# Patient Record
Sex: FEMALE | Race: WHITE | NOT HISPANIC OR LATINO | Employment: FULL TIME | URBAN - METROPOLITAN AREA
[De-identification: names, ages, dates, MRNs, and addresses within clinical notes are randomized per-mention and may not be internally consistent; named-entity substitution may affect disease eponyms.]

---

## 2019-07-22 ENCOUNTER — APPOINTMENT (OUTPATIENT)
Dept: ADMISSIONS | Facility: MEDICAL CENTER | Age: 24
DRG: 621 | End: 2019-07-22
Payer: COMMERCIAL

## 2019-07-22 DIAGNOSIS — Z01.812 PRE-OPERATIVE LABORATORY EXAMINATION: ICD-10-CM

## 2019-07-22 DIAGNOSIS — Z01.811 PRE-OPERATIVE RESPIRATORY EXAMINATION: ICD-10-CM

## 2019-07-22 DIAGNOSIS — Z01.810 PRE-OPERATIVE CARDIOVASCULAR EXAMINATION: ICD-10-CM

## 2019-07-23 ENCOUNTER — HOSPITAL ENCOUNTER (OUTPATIENT)
Dept: RADIOLOGY | Facility: MEDICAL CENTER | Age: 24
DRG: 621 | End: 2019-07-23
Attending: SURGERY
Payer: COMMERCIAL

## 2019-07-23 DIAGNOSIS — Z01.811 PRE-OPERATIVE RESPIRATORY EXAMINATION: ICD-10-CM

## 2019-07-23 DIAGNOSIS — Z01.812 PRE-PROCEDURAL LABORATORY EXAMINATION: Primary | ICD-10-CM

## 2019-07-23 DIAGNOSIS — Z01.810 PRE-OPERATIVE CARDIOVASCULAR EXAMINATION: ICD-10-CM

## 2019-07-23 LAB
25(OH)D3 SERPL-MCNC: 19 NG/ML (ref 30–100)
ALBUMIN SERPL BCP-MCNC: 4.1 G/DL (ref 3.2–4.9)
ALBUMIN/GLOB SERPL: 1.1 G/DL
ALP SERPL-CCNC: 93 U/L (ref 30–99)
ALT SERPL-CCNC: 18 U/L (ref 2–50)
ANION GAP SERPL CALC-SCNC: 9 MMOL/L (ref 0–11.9)
AST SERPL-CCNC: 16 U/L (ref 12–45)
BASOPHILS # BLD AUTO: 0.6 % (ref 0–1.8)
BASOPHILS # BLD: 0.08 K/UL (ref 0–0.12)
BILIRUB SERPL-MCNC: 0.5 MG/DL (ref 0.1–1.5)
BUN SERPL-MCNC: 18 MG/DL (ref 8–22)
CALCIUM SERPL-MCNC: 9.4 MG/DL (ref 8.5–10.5)
CHLORIDE SERPL-SCNC: 108 MMOL/L (ref 96–112)
CHOLEST SERPL-MCNC: 173 MG/DL (ref 100–199)
CO2 SERPL-SCNC: 21 MMOL/L (ref 20–33)
CREAT SERPL-MCNC: 0.68 MG/DL (ref 0.5–1.4)
EKG IMPRESSION: NORMAL
EOSINOPHIL # BLD AUTO: 0.19 K/UL (ref 0–0.51)
EOSINOPHIL NFR BLD: 1.5 % (ref 0–6.9)
ERYTHROCYTE [DISTWIDTH] IN BLOOD BY AUTOMATED COUNT: 40.9 FL (ref 35.9–50)
FERRITIN SERPL-MCNC: 55.9 NG/ML (ref 10–291)
FOLATE SERPL-MCNC: 19.6 NG/ML
GLOBULIN SER CALC-MCNC: 3.7 G/DL (ref 1.9–3.5)
GLUCOSE SERPL-MCNC: 91 MG/DL (ref 65–99)
HCT VFR BLD AUTO: 47.9 % (ref 37–47)
HDLC SERPL-MCNC: 46 MG/DL
HGB BLD-MCNC: 15.4 G/DL (ref 12–16)
IMM GRANULOCYTES # BLD AUTO: 0.05 K/UL (ref 0–0.11)
IMM GRANULOCYTES NFR BLD AUTO: 0.4 % (ref 0–0.9)
IRON SATN MFR SERPL: 23 % (ref 15–55)
IRON SERPL-MCNC: 84 UG/DL (ref 40–170)
LDLC SERPL CALC-MCNC: 102 MG/DL
LYMPHOCYTES # BLD AUTO: 2.65 K/UL (ref 1–4.8)
LYMPHOCYTES NFR BLD: 20.6 % (ref 22–41)
MCH RBC QN AUTO: 27.1 PG (ref 27–33)
MCHC RBC AUTO-ENTMCNC: 32.2 G/DL (ref 33.6–35)
MCV RBC AUTO: 84.2 FL (ref 81.4–97.8)
MONOCYTES # BLD AUTO: 0.66 K/UL (ref 0–0.85)
MONOCYTES NFR BLD AUTO: 5.1 % (ref 0–13.4)
NEUTROPHILS # BLD AUTO: 9.26 K/UL (ref 2–7.15)
NEUTROPHILS NFR BLD: 71.8 % (ref 44–72)
NRBC # BLD AUTO: 0 K/UL
NRBC BLD-RTO: 0 /100 WBC
PLATELET # BLD AUTO: 216 K/UL (ref 164–446)
PMV BLD AUTO: 12.1 FL (ref 9–12.9)
POTASSIUM SERPL-SCNC: 3.9 MMOL/L (ref 3.6–5.5)
PREALB SERPL-MCNC: 23 MG/DL (ref 18–38)
PROT SERPL-MCNC: 7.8 G/DL (ref 6–8.2)
PTH-INTACT SERPL-MCNC: 45.5 PG/ML (ref 14–72)
RBC # BLD AUTO: 5.69 M/UL (ref 4.2–5.4)
SODIUM SERPL-SCNC: 138 MMOL/L (ref 135–145)
TIBC SERPL-MCNC: 367 UG/DL (ref 250–450)
TRIGL SERPL-MCNC: 124 MG/DL (ref 0–149)
VIT B12 SERPL-MCNC: 323 PG/ML (ref 211–911)
WBC # BLD AUTO: 12.9 K/UL (ref 4.8–10.8)

## 2019-07-23 PROCEDURE — 83540 ASSAY OF IRON: CPT

## 2019-07-23 PROCEDURE — 84134 ASSAY OF PREALBUMIN: CPT

## 2019-07-23 PROCEDURE — 84425 ASSAY OF VITAMIN B-1: CPT

## 2019-07-23 PROCEDURE — 85025 COMPLETE CBC W/AUTO DIFF WBC: CPT

## 2019-07-23 PROCEDURE — 82746 ASSAY OF FOLIC ACID SERUM: CPT

## 2019-07-23 PROCEDURE — 82607 VITAMIN B-12: CPT

## 2019-07-23 PROCEDURE — 93010 ELECTROCARDIOGRAM REPORT: CPT | Performed by: INTERNAL MEDICINE

## 2019-07-23 PROCEDURE — 93005 ELECTROCARDIOGRAM TRACING: CPT

## 2019-07-23 PROCEDURE — 71046 X-RAY EXAM CHEST 2 VIEWS: CPT

## 2019-07-23 PROCEDURE — 82728 ASSAY OF FERRITIN: CPT

## 2019-07-23 PROCEDURE — 83550 IRON BINDING TEST: CPT

## 2019-07-23 PROCEDURE — 82306 VITAMIN D 25 HYDROXY: CPT

## 2019-07-23 PROCEDURE — 83970 ASSAY OF PARATHORMONE: CPT

## 2019-07-23 PROCEDURE — 36415 COLL VENOUS BLD VENIPUNCTURE: CPT

## 2019-07-23 PROCEDURE — 80053 COMPREHEN METABOLIC PANEL: CPT

## 2019-07-23 PROCEDURE — 80061 LIPID PANEL: CPT

## 2019-07-23 RX ORDER — LEVONORGESTREL / ETHINYL ESTRADIOL AND ETHINYL ESTRADIOL 150-30(84)
1 KIT ORAL DAILY
COMMUNITY
End: 2019-08-31

## 2019-07-23 RX ORDER — METOPROLOL SUCCINATE 50 MG/1
50 TABLET, EXTENDED RELEASE ORAL DAILY
COMMUNITY
End: 2019-07-23

## 2019-07-23 RX ORDER — PHENTERMINE HYDROCHLORIDE 37.5 MG/1
37.5 CAPSULE ORAL EVERY MORNING
Status: ON HOLD | COMMUNITY
End: 2019-08-05

## 2019-07-26 LAB — VIT B1 BLD-MCNC: 151 NMOL/L (ref 70–180)

## 2019-08-02 RX ORDER — ACETAMINOPHEN 10 MG/ML
1 INJECTION, SOLUTION INTRAVENOUS ONCE
Status: COMPLETED | OUTPATIENT
Start: 2019-08-03 | End: 2019-08-03

## 2019-08-05 ENCOUNTER — ANESTHESIA EVENT (OUTPATIENT)
Dept: SURGERY | Facility: MEDICAL CENTER | Age: 24
DRG: 621 | End: 2019-08-05
Payer: COMMERCIAL

## 2019-08-05 ENCOUNTER — ANESTHESIA (OUTPATIENT)
Dept: SURGERY | Facility: MEDICAL CENTER | Age: 24
DRG: 621 | End: 2019-08-05
Payer: COMMERCIAL

## 2019-08-05 ENCOUNTER — HOSPITAL ENCOUNTER (INPATIENT)
Facility: MEDICAL CENTER | Age: 24
LOS: 1 days | DRG: 621 | End: 2019-08-06
Attending: SURGERY | Admitting: SURGERY
Payer: COMMERCIAL

## 2019-08-05 DIAGNOSIS — G89.18 POSTOPERATIVE PAIN: ICD-10-CM

## 2019-08-05 LAB
GLUCOSE BLD-MCNC: 79 MG/DL (ref 65–99)
HCG UR QL: NEGATIVE
PATHOLOGY CONSULT NOTE: NORMAL
SP GR UR REFRACTOMETRY: 1.03

## 2019-08-05 PROCEDURE — 501570 HCHG TROCAR, SEPARATOR: Performed by: SURGERY

## 2019-08-05 PROCEDURE — 0DB64Z3 EXCISION OF STOMACH, PERCUTANEOUS ENDOSCOPIC APPROACH, VERTICAL: ICD-10-PCS | Performed by: SURGERY

## 2019-08-05 PROCEDURE — 502570 HCHG PACK, GASTRIC BANDING: Performed by: SURGERY

## 2019-08-05 PROCEDURE — 160002 HCHG RECOVERY MINUTES (STAT): Performed by: SURGERY

## 2019-08-05 PROCEDURE — 700111 HCHG RX REV CODE 636 W/ 250 OVERRIDE (IP): Performed by: SURGERY

## 2019-08-05 PROCEDURE — 700105 HCHG RX REV CODE 258: Performed by: SURGERY

## 2019-08-05 PROCEDURE — 770006 HCHG ROOM/CARE - MED/SURG/GYN SEMI*

## 2019-08-05 PROCEDURE — 82962 GLUCOSE BLOOD TEST: CPT

## 2019-08-05 PROCEDURE — 160009 HCHG ANES TIME/MIN: Performed by: SURGERY

## 2019-08-05 PROCEDURE — 88305 TISSUE EXAM BY PATHOLOGIST: CPT

## 2019-08-05 PROCEDURE — 160048 HCHG OR STATISTICAL LEVEL 1-5: Performed by: SURGERY

## 2019-08-05 PROCEDURE — 160035 HCHG PACU - 1ST 60 MINS PHASE I: Performed by: SURGERY

## 2019-08-05 PROCEDURE — 501571 HCHG TROCAR, SEPARATOR 12X100: Performed by: SURGERY

## 2019-08-05 PROCEDURE — 94760 N-INVAS EAR/PLS OXIMETRY 1: CPT

## 2019-08-05 PROCEDURE — 500868 HCHG NEEDLE, SURGI(VARES): Performed by: SURGERY

## 2019-08-05 PROCEDURE — 700101 HCHG RX REV CODE 250: Performed by: ANESTHESIOLOGY

## 2019-08-05 PROCEDURE — A9270 NON-COVERED ITEM OR SERVICE: HCPCS | Performed by: SURGERY

## 2019-08-05 PROCEDURE — 700111 HCHG RX REV CODE 636 W/ 250 OVERRIDE (IP): Performed by: PHYSICIAN ASSISTANT

## 2019-08-05 PROCEDURE — 0BQT4ZZ REPAIR DIAPHRAGM, PERCUTANEOUS ENDOSCOPIC APPROACH: ICD-10-PCS | Performed by: SURGERY

## 2019-08-05 PROCEDURE — 700105 HCHG RX REV CODE 258: Performed by: PHYSICIAN ASSISTANT

## 2019-08-05 PROCEDURE — 700102 HCHG RX REV CODE 250 W/ 637 OVERRIDE(OP): Performed by: SURGERY

## 2019-08-05 PROCEDURE — 700101 HCHG RX REV CODE 250: Performed by: SURGERY

## 2019-08-05 PROCEDURE — 501664 HCHG TUBING, FILTER STRYKER: Performed by: SURGERY

## 2019-08-05 PROCEDURE — 160041 HCHG SURGERY MINUTES - EA ADDL 1 MIN LEVEL 4: Performed by: SURGERY

## 2019-08-05 PROCEDURE — 700111 HCHG RX REV CODE 636 W/ 250 OVERRIDE (IP): Performed by: ANESTHESIOLOGY

## 2019-08-05 PROCEDURE — 500521 HCHG ENDOSTITCH LOAD UNIT: Performed by: SURGERY

## 2019-08-05 PROCEDURE — 160036 HCHG PACU - EA ADDL 30 MINS PHASE I: Performed by: SURGERY

## 2019-08-05 PROCEDURE — 160029 HCHG SURGERY MINUTES - 1ST 30 MINS LEVEL 4: Performed by: SURGERY

## 2019-08-05 PROCEDURE — 500800 HCHG LAPAROSCOPIC J/L HOOK: Performed by: SURGERY

## 2019-08-05 PROCEDURE — A9270 NON-COVERED ITEM OR SERVICE: HCPCS | Performed by: ANESTHESIOLOGY

## 2019-08-05 PROCEDURE — 501583 HCHG TROCAR, THRD CAN&SEAL 5X100: Performed by: SURGERY

## 2019-08-05 PROCEDURE — A6402 STERILE GAUZE <= 16 SQ IN: HCPCS | Performed by: SURGERY

## 2019-08-05 PROCEDURE — 81025 URINE PREGNANCY TEST: CPT

## 2019-08-05 PROCEDURE — 501838 HCHG SUTURE GENERAL: Performed by: SURGERY

## 2019-08-05 PROCEDURE — 700102 HCHG RX REV CODE 250 W/ 637 OVERRIDE(OP): Performed by: ANESTHESIOLOGY

## 2019-08-05 PROCEDURE — 500522 HCHG ENDOSTITCH SUTURING DEVICE: Performed by: SURGERY

## 2019-08-05 RX ORDER — DEXAMETHASONE SODIUM PHOSPHATE 4 MG/ML
INJECTION, SOLUTION INTRA-ARTICULAR; INTRALESIONAL; INTRAMUSCULAR; INTRAVENOUS; SOFT TISSUE PRN
Status: DISCONTINUED | OUTPATIENT
Start: 2019-08-05 | End: 2019-08-05 | Stop reason: SURG

## 2019-08-05 RX ORDER — ENALAPRILAT 1.25 MG/ML
2.5 INJECTION INTRAVENOUS EVERY 6 HOURS PRN
Status: DISCONTINUED | OUTPATIENT
Start: 2019-08-05 | End: 2019-08-06 | Stop reason: HOSPADM

## 2019-08-05 RX ORDER — OXYCODONE HCL 5 MG/5 ML
10 SOLUTION, ORAL ORAL
Status: COMPLETED | OUTPATIENT
Start: 2019-08-05 | End: 2019-08-05

## 2019-08-05 RX ORDER — OXYCODONE HCL 5 MG/5 ML
5 SOLUTION, ORAL ORAL
Status: COMPLETED | OUTPATIENT
Start: 2019-08-05 | End: 2019-08-05

## 2019-08-05 RX ORDER — ROCURONIUM BROMIDE 10 MG/ML
INJECTION, SOLUTION INTRAVENOUS PRN
Status: DISCONTINUED | OUTPATIENT
Start: 2019-08-05 | End: 2019-08-05 | Stop reason: SURG

## 2019-08-05 RX ORDER — ACETAMINOPHEN 10 MG/ML
1 INJECTION, SOLUTION INTRAVENOUS ONCE
Status: COMPLETED | OUTPATIENT
Start: 2019-08-05 | End: 2019-08-05

## 2019-08-05 RX ORDER — PROMETHAZINE HYDROCHLORIDE 25 MG/1
25 SUPPOSITORY RECTAL EVERY 4 HOURS PRN
Status: DISCONTINUED | OUTPATIENT
Start: 2019-08-05 | End: 2019-08-06 | Stop reason: HOSPADM

## 2019-08-05 RX ORDER — ONDANSETRON 4 MG/1
4 TABLET, ORALLY DISINTEGRATING ORAL EVERY 4 HOURS PRN
Status: DISCONTINUED | OUTPATIENT
Start: 2019-08-05 | End: 2019-08-06 | Stop reason: HOSPADM

## 2019-08-05 RX ORDER — LORAZEPAM 2 MG/ML
.5-1 INJECTION INTRAMUSCULAR EVERY 4 HOURS PRN
Status: DISCONTINUED | OUTPATIENT
Start: 2019-08-05 | End: 2019-08-06 | Stop reason: HOSPADM

## 2019-08-05 RX ORDER — SODIUM CHLORIDE, SODIUM LACTATE, POTASSIUM CHLORIDE, CALCIUM CHLORIDE 600; 310; 30; 20 MG/100ML; MG/100ML; MG/100ML; MG/100ML
INJECTION, SOLUTION INTRAVENOUS CONTINUOUS
Status: ACTIVE | OUTPATIENT
Start: 2019-08-05 | End: 2019-08-05

## 2019-08-05 RX ORDER — DIPHENHYDRAMINE HYDROCHLORIDE 50 MG/ML
12.5 INJECTION INTRAMUSCULAR; INTRAVENOUS EVERY 6 HOURS PRN
Status: DISCONTINUED | OUTPATIENT
Start: 2019-08-05 | End: 2019-08-06 | Stop reason: HOSPADM

## 2019-08-05 RX ORDER — MORPHINE SULFATE 4 MG/ML
1-5 INJECTION, SOLUTION INTRAMUSCULAR; INTRAVENOUS
Status: DISCONTINUED | OUTPATIENT
Start: 2019-08-05 | End: 2019-08-06 | Stop reason: HOSPADM

## 2019-08-05 RX ORDER — HALOPERIDOL 5 MG/ML
1 INJECTION INTRAMUSCULAR
Status: DISCONTINUED | OUTPATIENT
Start: 2019-08-05 | End: 2019-08-05 | Stop reason: HOSPADM

## 2019-08-05 RX ORDER — ACETAMINOPHEN 10 MG/ML
1000 INJECTION, SOLUTION INTRAVENOUS EVERY 6 HOURS
Status: COMPLETED | OUTPATIENT
Start: 2019-08-05 | End: 2019-08-06

## 2019-08-05 RX ORDER — HYDROMORPHONE HYDROCHLORIDE 1 MG/ML
0.2 INJECTION, SOLUTION INTRAMUSCULAR; INTRAVENOUS; SUBCUTANEOUS
Status: DISCONTINUED | OUTPATIENT
Start: 2019-08-05 | End: 2019-08-05 | Stop reason: HOSPADM

## 2019-08-05 RX ORDER — DEXMEDETOMIDINE HYDROCHLORIDE 100 UG/ML
INJECTION, SOLUTION INTRAVENOUS PRN
Status: DISCONTINUED | OUTPATIENT
Start: 2019-08-05 | End: 2019-08-05 | Stop reason: SURG

## 2019-08-05 RX ORDER — ONDANSETRON 2 MG/ML
4 INJECTION INTRAMUSCULAR; INTRAVENOUS EVERY 4 HOURS PRN
Status: DISCONTINUED | OUTPATIENT
Start: 2019-08-05 | End: 2019-08-06 | Stop reason: HOSPADM

## 2019-08-05 RX ORDER — HYDRALAZINE HYDROCHLORIDE 20 MG/ML
10 INJECTION INTRAMUSCULAR; INTRAVENOUS EVERY 6 HOURS PRN
Status: DISCONTINUED | OUTPATIENT
Start: 2019-08-05 | End: 2019-08-06 | Stop reason: HOSPADM

## 2019-08-05 RX ORDER — METOPROLOL TARTRATE 1 MG/ML
1 INJECTION, SOLUTION INTRAVENOUS
Status: DISCONTINUED | OUTPATIENT
Start: 2019-08-05 | End: 2019-08-05 | Stop reason: HOSPADM

## 2019-08-05 RX ORDER — BUPIVACAINE HYDROCHLORIDE AND EPINEPHRINE 5; 5 MG/ML; UG/ML
INJECTION, SOLUTION EPIDURAL; INTRACAUDAL; PERINEURAL
Status: DISCONTINUED | OUTPATIENT
Start: 2019-08-05 | End: 2019-08-05 | Stop reason: HOSPADM

## 2019-08-05 RX ORDER — ONDANSETRON 2 MG/ML
4 INJECTION INTRAMUSCULAR; INTRAVENOUS
Status: DISCONTINUED | OUTPATIENT
Start: 2019-08-05 | End: 2019-08-05 | Stop reason: HOSPADM

## 2019-08-05 RX ORDER — LIDOCAINE HYDROCHLORIDE 10 MG/ML
INJECTION, SOLUTION EPIDURAL; INFILTRATION; INTRACAUDAL; PERINEURAL
Status: COMPLETED
Start: 2019-08-05 | End: 2019-08-05

## 2019-08-05 RX ORDER — FLUOXETINE HYDROCHLORIDE 20 MG/1
20 CAPSULE ORAL DAILY
COMMUNITY
End: 2019-08-31

## 2019-08-05 RX ORDER — SODIUM CHLORIDE, SODIUM LACTATE, POTASSIUM CHLORIDE, CALCIUM CHLORIDE 600; 310; 30; 20 MG/100ML; MG/100ML; MG/100ML; MG/100ML
INJECTION, SOLUTION INTRAVENOUS CONTINUOUS
Status: DISCONTINUED | OUTPATIENT
Start: 2019-08-05 | End: 2019-08-06 | Stop reason: HOSPADM

## 2019-08-05 RX ORDER — SODIUM CHLORIDE, SODIUM LACTATE, POTASSIUM CHLORIDE, AND CALCIUM CHLORIDE .6; .31; .03; .02 G/100ML; G/100ML; G/100ML; G/100ML
500 INJECTION, SOLUTION INTRAVENOUS
Status: DISCONTINUED | OUTPATIENT
Start: 2019-08-05 | End: 2019-08-06 | Stop reason: HOSPADM

## 2019-08-05 RX ORDER — CEFAZOLIN SODIUM 1 G/3ML
INJECTION, POWDER, FOR SOLUTION INTRAMUSCULAR; INTRAVENOUS PRN
Status: DISCONTINUED | OUTPATIENT
Start: 2019-08-05 | End: 2019-08-05 | Stop reason: SURG

## 2019-08-05 RX ORDER — HYDRALAZINE HYDROCHLORIDE 20 MG/ML
5 INJECTION INTRAMUSCULAR; INTRAVENOUS
Status: DISCONTINUED | OUTPATIENT
Start: 2019-08-05 | End: 2019-08-05 | Stop reason: HOSPADM

## 2019-08-05 RX ORDER — PROMETHAZINE HYDROCHLORIDE 25 MG/1
25 SUPPOSITORY RECTAL EVERY 6 HOURS PRN
Status: DISCONTINUED | OUTPATIENT
Start: 2019-08-05 | End: 2019-08-06 | Stop reason: HOSPADM

## 2019-08-05 RX ORDER — ONDANSETRON 2 MG/ML
INJECTION INTRAMUSCULAR; INTRAVENOUS PRN
Status: DISCONTINUED | OUTPATIENT
Start: 2019-08-05 | End: 2019-08-05 | Stop reason: SURG

## 2019-08-05 RX ORDER — SCOLOPAMINE TRANSDERMAL SYSTEM 1 MG/1
1 PATCH, EXTENDED RELEASE TRANSDERMAL
Status: DISCONTINUED | OUTPATIENT
Start: 2019-08-05 | End: 2019-08-06 | Stop reason: HOSPADM

## 2019-08-05 RX ORDER — KETOROLAC TROMETHAMINE 30 MG/ML
30 INJECTION, SOLUTION INTRAMUSCULAR; INTRAVENOUS EVERY 6 HOURS
Status: DISCONTINUED | OUTPATIENT
Start: 2019-08-05 | End: 2019-08-06 | Stop reason: HOSPADM

## 2019-08-05 RX ORDER — METOPROLOL TARTRATE 1 MG/ML
INJECTION, SOLUTION INTRAVENOUS PRN
Status: DISCONTINUED | OUTPATIENT
Start: 2019-08-05 | End: 2019-08-05 | Stop reason: SURG

## 2019-08-05 RX ORDER — HYDROMORPHONE HYDROCHLORIDE 1 MG/ML
0.4 INJECTION, SOLUTION INTRAMUSCULAR; INTRAVENOUS; SUBCUTANEOUS
Status: DISCONTINUED | OUTPATIENT
Start: 2019-08-05 | End: 2019-08-05 | Stop reason: HOSPADM

## 2019-08-05 RX ORDER — HYDROMORPHONE HYDROCHLORIDE 1 MG/ML
0.1 INJECTION, SOLUTION INTRAMUSCULAR; INTRAVENOUS; SUBCUTANEOUS
Status: DISCONTINUED | OUTPATIENT
Start: 2019-08-05 | End: 2019-08-05 | Stop reason: HOSPADM

## 2019-08-05 RX ORDER — DIPHENHYDRAMINE HYDROCHLORIDE 50 MG/ML
12.5 INJECTION INTRAMUSCULAR; INTRAVENOUS
Status: DISCONTINUED | OUTPATIENT
Start: 2019-08-05 | End: 2019-08-05 | Stop reason: HOSPADM

## 2019-08-05 RX ADMIN — SODIUM CHLORIDE, POTASSIUM CHLORIDE, SODIUM LACTATE AND CALCIUM CHLORIDE: 600; 310; 30; 20 INJECTION, SOLUTION INTRAVENOUS at 13:37

## 2019-08-05 RX ADMIN — FAMOTIDINE 20 MG: 10 INJECTION INTRAVENOUS at 18:01

## 2019-08-05 RX ADMIN — SCOPALAMINE 1 PATCH: 1 PATCH, EXTENDED RELEASE TRANSDERMAL at 12:38

## 2019-08-05 RX ADMIN — FENTANYL CITRATE 50 MCG: 50 INJECTION, SOLUTION INTRAMUSCULAR; INTRAVENOUS at 14:32

## 2019-08-05 RX ADMIN — SUGAMMADEX 200 MG: 100 INJECTION, SOLUTION INTRAVENOUS at 14:05

## 2019-08-05 RX ADMIN — FENTANYL CITRATE 50 MCG: 50 INJECTION, SOLUTION INTRAMUSCULAR; INTRAVENOUS at 14:50

## 2019-08-05 RX ADMIN — ROCURONIUM BROMIDE 80 MG: 10 INJECTION, SOLUTION INTRAVENOUS at 13:11

## 2019-08-05 RX ADMIN — SODIUM CHLORIDE, POTASSIUM CHLORIDE, SODIUM LACTATE AND CALCIUM CHLORIDE: 600; 310; 30; 20 INJECTION, SOLUTION INTRAVENOUS at 12:50

## 2019-08-05 RX ADMIN — OXYCODONE HYDROCHLORIDE 10 MG: 5 SOLUTION ORAL at 14:48

## 2019-08-05 RX ADMIN — ACETAMINOPHEN 1 G: 10 INJECTION, SOLUTION INTRAVENOUS at 12:38

## 2019-08-05 RX ADMIN — FENTANYL CITRATE 150 MCG: 50 INJECTION, SOLUTION INTRAMUSCULAR; INTRAVENOUS at 13:23

## 2019-08-05 RX ADMIN — DEXAMETHASONE SODIUM PHOSPHATE 8 MG: 4 INJECTION, SOLUTION INTRA-ARTICULAR; INTRALESIONAL; INTRAMUSCULAR; INTRAVENOUS; SOFT TISSUE at 13:22

## 2019-08-05 RX ADMIN — MORPHINE SULFATE 1 MG: 4 INJECTION INTRAVENOUS at 19:57

## 2019-08-05 RX ADMIN — ACETAMINOPHEN 1000 MG: 10 INJECTION, SOLUTION INTRAVENOUS at 19:58

## 2019-08-05 RX ADMIN — FENTANYL CITRATE 100 MCG: 50 INJECTION, SOLUTION INTRAMUSCULAR; INTRAVENOUS at 13:11

## 2019-08-05 RX ADMIN — PROPOFOL 200 MG: 10 INJECTION, EMULSION INTRAVENOUS at 13:11

## 2019-08-05 RX ADMIN — ONDANSETRON 4 MG: 2 INJECTION INTRAMUSCULAR; INTRAVENOUS at 19:58

## 2019-08-05 RX ADMIN — SODIUM CHLORIDE, POTASSIUM CHLORIDE, SODIUM LACTATE AND CALCIUM CHLORIDE: 600; 310; 30; 20 INJECTION, SOLUTION INTRAVENOUS at 18:00

## 2019-08-05 RX ADMIN — METOPROLOL TARTRATE 3 MG: 5 INJECTION, SOLUTION INTRAVENOUS at 13:31

## 2019-08-05 RX ADMIN — DEXMEDETOMIDINE HYDROCHLORIDE 50 MCG: 100 INJECTION, SOLUTION INTRAVENOUS at 14:17

## 2019-08-05 RX ADMIN — METOPROLOL TARTRATE 2 MG: 5 INJECTION, SOLUTION INTRAVENOUS at 13:28

## 2019-08-05 RX ADMIN — FENTANYL CITRATE 100 MCG: 50 INJECTION, SOLUTION INTRAMUSCULAR; INTRAVENOUS at 14:07

## 2019-08-05 RX ADMIN — CEFAZOLIN 3 G: 330 INJECTION, POWDER, FOR SOLUTION INTRAMUSCULAR; INTRAVENOUS at 13:07

## 2019-08-05 RX ADMIN — ONDANSETRON 4 MG: 2 INJECTION INTRAMUSCULAR; INTRAVENOUS at 13:22

## 2019-08-05 ASSESSMENT — LIFESTYLE VARIABLES
HAVE YOU EVER FELT YOU SHOULD CUT DOWN ON YOUR DRINKING: NO
EVER_SMOKED: NEVER
EVER FELT BAD OR GUILTY ABOUT YOUR DRINKING: NO
DOES PATIENT WANT TO STOP DRINKING: NO
EVER HAD A DRINK FIRST THING IN THE MORNING TO STEADY YOUR NERVES TO GET RID OF A HANGOVER: NO
ON A TYPICAL DAY WHEN YOU DRINK ALCOHOL HOW MANY DRINKS DO YOU HAVE: 4
HOW MANY TIMES IN THE PAST YEAR HAVE YOU HAD 5 OR MORE DRINKS IN A DAY: 2
CONSUMPTION TOTAL: POSITIVE
AVERAGE NUMBER OF DAYS PER WEEK YOU HAVE A DRINK CONTAINING ALCOHOL: .25
EVER_SMOKED: NEVER
TOTAL SCORE: 0
TOTAL SCORE: 0
ALCOHOL_USE: YES
HAVE PEOPLE ANNOYED YOU BY CRITICIZING YOUR DRINKING: NO
TOTAL SCORE: 0

## 2019-08-05 ASSESSMENT — PAIN SCALES - GENERAL: PAIN_LEVEL: 5

## 2019-08-05 ASSESSMENT — COGNITIVE AND FUNCTIONAL STATUS - GENERAL
DAILY ACTIVITIY SCORE: 24
SUGGESTED CMS G CODE MODIFIER DAILY ACTIVITY: CH
MOBILITY SCORE: 24
SUGGESTED CMS G CODE MODIFIER MOBILITY: CH

## 2019-08-05 ASSESSMENT — COPD QUESTIONNAIRES
DURING THE PAST 4 WEEKS HOW MUCH DID YOU FEEL SHORT OF BREATH: NONE/LITTLE OF THE TIME
DO YOU EVER COUGH UP ANY MUCUS OR PHLEGM?: NO/ONLY WITH OCCASIONAL COLDS OR INFECTIONS
HAVE YOU SMOKED AT LEAST 100 CIGARETTES IN YOUR ENTIRE LIFE: NO/DON'T KNOW
COPD SCREENING SCORE: 0

## 2019-08-05 ASSESSMENT — PATIENT HEALTH QUESTIONNAIRE - PHQ9
2. FEELING DOWN, DEPRESSED, IRRITABLE, OR HOPELESS: NOT AT ALL
1. LITTLE INTEREST OR PLEASURE IN DOING THINGS: NOT AT ALL
SUM OF ALL RESPONSES TO PHQ9 QUESTIONS 1 AND 2: 0

## 2019-08-05 ASSESSMENT — PAIN SCALES - WONG BAKER: WONGBAKER_NUMERICALRESPONSE: HURTS AS MUCH AS POSSIBLE

## 2019-08-05 NOTE — PROGRESS NOTES
Pr Op completed for patient. Family arrived just in time for surgery. VSS and no c/o pain at this time. Patient educated on use of call light and bed in low position. No questions or concerns at this time.

## 2019-08-05 NOTE — ANESTHESIA TIME REPORT
Anesthesia Start and Stop Event Times     Date Time Event    8/5/2019 1305 Ready for Procedure     1307 Anesthesia Start     1421 Anesthesia Stop        Responsible Staff  08/05/19    Name Role Begin End    Carlos Baez M.D. Anesth 1307 1421        Preop Diagnosis (Free Text):  Pre-op Diagnosis     MORBID OBESITY         Preop Diagnosis (Codes):    Post op Diagnosis  Morbid obesity (HCC)      Premium Reason  Non-Premium    Comments:

## 2019-08-05 NOTE — OP REPORT
Operative Report    Date: 8/5/2019    Surgeon: John Ganser M.D.    Assistant: Levy Hoffman PA-C    Anesthesia: Dr. Baez    Preoperative Diagnosis: Morbid Obesity, GERD, PCOS    Postoperative Diagnosis: Same, Hiatal Hernia    Procedure Performed: Laparoscopic Vertical Sleeve Gastrectomy, Hiatal hernia repair    Indications: The patient is suffering from morbid obesity and it's sequelae with a body mass index of 51.8 kg/m2. They have failed dietary attempts at weight loss and have been fully counseled about risks and alternatives to sleeve gastrectomy and wish to proceed.    Findings: Sliding hiatal hernia    DESCRIPTION OF PROCEDURE: The patient was identified and general anesthetic   administered. Her abdomen was prepped and draped in the usual sterile   fashion. Local anesthesia of 0.5% Marcaine with epinephrine was injected   prior to making skin incision. Small incision was made to left midline in low  epigastric region and the Veress needle passed. The abdomen was insufflated   with carbon dioxide without incident and a 5-mm blunt trocar and 5-mm   30-degree scope inserted. Girma liver retractor was passed through a   small subxiphoid incision, used to elevate the lateral segment of liver and   this was held with the robotic arm. Right upper quadrant 12 mm, left upper   quadrant 15 mm, left lateral subcostal 5 mm trocars were placed. Inspection   of the hiatus revealed a sliding hiatal hernia. The fat pad was rotated off the gastroesophageal junction to help expose this area as well. The omentum was then taken down off the greater curve of the stomach using the Harmonic scalpel.    The gastrohepatic ligament was divided and the posterior hiatal hernia was reduced. The hiatal hernia was repaired posteriorly with 2 sutures using the Endostitch device and 0-Surgidac.    A 40-Tajik bougie wasthen passed by anesthesia and laid along the lesser curve of the stomach. The Del Aire 60 power stapler was then  placed starting about 4-5 cm proximal to the pylorus and positioned adjacent to the bougie and fired. The sleeve was   completed with sequential firing of the stapler using green and gold loads. A  small cuff of stomach was left adjacent to the esophagus. The distal stomach was removed through the 15 mm trocar site.    An anterior hiatal closure suture was placed using with 0-Surgidac.The staple line was then oversewn with 3-0 absorbable V-Loc suture   incorporating the omentum along the way. The bougie was removed and the   sleeve maintained good orientation with no torsion or kinks. The abdomen was   irrigated and hemostasis assured. The trocars were removed. The abdomen   deflated, and incisions closed with Vicryl. Sterile dressings were applied.   The patient returned to recovery room in stable condition.    ____________________________________  JOHN H. GANSER, MD John Ganser, M.D., F.A.C.S.  [facility]  [facility]  [phone]

## 2019-08-05 NOTE — ANESTHESIA POSTPROCEDURE EVALUATION
Patient: Kenzie Gray    Procedure Summary     Date:  08/05/19 Room / Location:  [facility] 09 / SURGERY [facility]    Anesthesia Start:  1307 Anesthesia Stop:  1421    Procedure:  GASTRECTOMY, SLEEVE, LAPAROSCOPIC, HIATAL HERNIA REPAIR (Abdomen) Diagnosis:  (MORBID OBESITY )    Surgeon:  John H Ganser, M.D. Responsible Provider:  Carlos Baez M.D.    Anesthesia Type:  general ASA Status:  3          Final Anesthesia Type: general  Last vitals  BP   NIBP: 132/76    Temp   36.6 °C (97.9 °F)    Pulse   Pulse: 97   Resp   16    SpO2   93 %      Anesthesia Post Evaluation    Patient location during evaluation: PACU  Patient participation: complete - patient participated  Level of consciousness: awake and alert  Pain score: 5    Airway patency: patent  Anesthetic complications: no  Cardiovascular status: hemodynamically stable  Respiratory status: acceptable  Hydration status: euvolemic    PONV: none           Nurse Pain Score: 0 (NPRS)

## 2019-08-05 NOTE — DISCHARGE INSTR - OTHER INFO
Gastric bypass clears today, advance diet as tolerated to gastric bypass full liquids on morning of POD#2.  Incentive spirometry Q hr while awake, continue at home.  Up ad adi.  Ok to Shower over Tegaderms today; remove Tegaderms on 8/09/19 .  No baths or soaks x 14 days.  No driving x 4-5 days.  No lifting > 15 lbs until after post-op appt.  D/C home when alert, comfortable, ambulatory, and tolerating PO well.  Pt Counseled re: I.S., diet, activity, home med's, continued use of incentive spirometer at home,  and wound care.  Begin PPI at home (all home med's larger in size than eraser head should be crushed or changed to liquid form x 2-3 weeks, while on liquid diet).  Hold MVI/supplements until after postop appointment.  F/U with Dr. Ganser ~ 1-2 weeks.

## 2019-08-05 NOTE — OR NURSING
Patient was very tearful in recovery. Medicated with IV and oral pain medications with good relief. Small ice chips given. Patient educated on goals for this recovery and expectations after surgery. She has rested and is eager to go to her room. Her mom has been updated. Awaiting room to be ready. Belongings are on her bed

## 2019-08-05 NOTE — ANESTHESIA PROCEDURE NOTES
Airway  Date/Time: 8/5/2019 1:12 PM  Performed by: Carlos Baez M.D.  Authorized by: Carlos Baez M.D.     Location:  OR  Urgency:  Elective  Indications for Airway Management:  Anesthesia  Spontaneous Ventilation: absent    Sedation Level:  Deep  Preoxygenated: Yes    Patient Position:  Sniffing  Final Airway Type:  Endotracheal airway  Final Endotracheal Airway:  ETT  Cuffed: Yes    Technique Used for Successful ETT Placement:  Direct laryngoscopy  Insertion Site:  Oral  Blade Type:  Ivett  Laryngoscope Blade/Videolaryngoscope Blade Size:  3  ETT Size (mm):  7.0  Measured from:  Teeth  ETT to Teeth (cm):  21  Placement Verified by: auscultation and capnometry    Cormack-Lehane Classification:  Grade I - full view of glottis  Number of Attempts at Approach:  1

## 2019-08-05 NOTE — ANESTHESIA PREPROCEDURE EVALUATION
Relevant Problems   CARDIAC   (+) Migraine headache       Physical Exam    Airway   Mallampati: III  TM distance: >3 FB  Neck ROM: full       Cardiovascular - normal exam  Rhythm: regular  Rate: normal  (-) murmur     Dental - normal exam         Pulmonary - normal exam  Breath sounds clear to auscultation     Abdominal    Neurological - normal exam                 Anesthesia Plan    ASA 3   ASA physical status 3 criteria: morbid obesity - BMI greater than or equal to 40    Plan - general       Airway plan will be ETT        Induction: intravenous    Postoperative Plan: Postoperative administration of opioids is intended.    Pertinent diagnostic labs and testing reviewed    Informed Consent:    Anesthetic plan and risks discussed with patient.    Use of blood products discussed with: patient whom consented to blood products.

## 2019-08-06 VITALS
RESPIRATION RATE: 16 BRPM | WEIGHT: 293 LBS | DIASTOLIC BLOOD PRESSURE: 60 MMHG | HEART RATE: 57 BPM | TEMPERATURE: 98.1 F | HEIGHT: 71 IN | SYSTOLIC BLOOD PRESSURE: 114 MMHG | BODY MASS INDEX: 41.02 KG/M2 | OXYGEN SATURATION: 98 %

## 2019-08-06 LAB
ANION GAP SERPL CALC-SCNC: 21 MMOL/L (ref 0–11.9)
BUN SERPL-MCNC: 12 MG/DL (ref 8–22)
CALCIUM SERPL-MCNC: 8.8 MG/DL (ref 8.5–10.5)
CHLORIDE SERPL-SCNC: 93 MMOL/L (ref 96–112)
CO2 SERPL-SCNC: 22 MMOL/L (ref 20–33)
CREAT SERPL-MCNC: 0.53 MG/DL (ref 0.5–1.4)
ERYTHROCYTE [DISTWIDTH] IN BLOOD BY AUTOMATED COUNT: 40.8 FL (ref 35.9–50)
GLUCOSE SERPL-MCNC: 110 MG/DL (ref 65–99)
HCT VFR BLD AUTO: 48.6 % (ref 37–47)
HGB BLD-MCNC: 15.6 G/DL (ref 12–16)
MCH RBC QN AUTO: 27 PG (ref 27–33)
MCHC RBC AUTO-ENTMCNC: 32.1 G/DL (ref 33.6–35)
MCV RBC AUTO: 84.2 FL (ref 81.4–97.8)
PLATELET # BLD AUTO: 150 K/UL (ref 164–446)
PMV BLD AUTO: 13.1 FL (ref 9–12.9)
POTASSIUM SERPL-SCNC: 3.8 MMOL/L (ref 3.6–5.5)
RBC # BLD AUTO: 5.77 M/UL (ref 4.2–5.4)
SODIUM SERPL-SCNC: 136 MMOL/L (ref 135–145)
WBC # BLD AUTO: 22 K/UL (ref 4.8–10.8)

## 2019-08-06 PROCEDURE — 80048 BASIC METABOLIC PNL TOTAL CA: CPT

## 2019-08-06 PROCEDURE — 85027 COMPLETE CBC AUTOMATED: CPT

## 2019-08-06 PROCEDURE — 700105 HCHG RX REV CODE 258: Performed by: PHYSICIAN ASSISTANT

## 2019-08-06 PROCEDURE — 36415 COLL VENOUS BLD VENIPUNCTURE: CPT

## 2019-08-06 PROCEDURE — 700111 HCHG RX REV CODE 636 W/ 250 OVERRIDE (IP): Performed by: PHYSICIAN ASSISTANT

## 2019-08-06 RX ADMIN — ENOXAPARIN SODIUM 40 MG: 100 INJECTION SUBCUTANEOUS at 08:02

## 2019-08-06 RX ADMIN — KETOROLAC TROMETHAMINE 30 MG: 30 INJECTION, SOLUTION INTRAMUSCULAR; INTRAVENOUS at 00:13

## 2019-08-06 RX ADMIN — KETOROLAC TROMETHAMINE 30 MG: 30 INJECTION, SOLUTION INTRAMUSCULAR; INTRAVENOUS at 06:18

## 2019-08-06 RX ADMIN — SODIUM CHLORIDE, POTASSIUM CHLORIDE, SODIUM LACTATE AND CALCIUM CHLORIDE: 600; 310; 30; 20 INJECTION, SOLUTION INTRAVENOUS at 00:14

## 2019-08-06 RX ADMIN — ACETAMINOPHEN 1000 MG: 10 INJECTION, SOLUTION INTRAVENOUS at 00:13

## 2019-08-06 RX ADMIN — FAMOTIDINE 20 MG: 10 INJECTION INTRAVENOUS at 06:18

## 2019-08-06 ASSESSMENT — PAIN SCALES - WONG BAKER: WONGBAKER_NUMERICALRESPONSE: HURTS JUST A LITTLE BIT

## 2019-08-06 ASSESSMENT — ENCOUNTER SYMPTOMS
SHORTNESS OF BREATH: 0
HEARTBURN: 0
NAUSEA: 0
ABDOMINAL PAIN: 1
CHILLS: 0
VOMITING: 0
WEIGHT LOSS: 0

## 2019-08-06 NOTE — PROGRESS NOTES
Surgical Progress Note    Author: Levy Hoffman Date & Time created: 2019   7:48 AM     Interval Events:  S/p Laparoscopic Vertical Sleeve Gastrectomy, hiatal hernia repair -POD#1, doing well; yon bariatric clears  well without dysphagia or N/V; ambulatory; using IS; pain controlled; wants to go home    Review of Systems   Constitutional: Negative for chills and weight loss.   Respiratory: Negative for shortness of breath.    Cardiovascular: Negative for leg swelling.   Gastrointestinal: Positive for abdominal pain. Negative for heartburn, nausea and vomiting.   Skin: Negative for itching and rash.     Hemodynamics:  Temp (24hrs), Av.4 °C (97.5 °F), Min:35.8 °C (96.5 °F), Max:36.8 °C (98.2 °F)  Temperature: 36.4 °C (97.5 °F)  Pulse  Av  Min: 61  Max: 97   Blood Pressure: 119/60, NIBP: 104/47     Respiratory:    Respiration: 18, Pulse Oximetry: 98 %           Neuro:  GCS       Fluids:    Intake/Output Summary (Last 24 hours) at 2019 0748  Last data filed at 2019 0400  Gross per 24 hour   Intake 2700 ml   Output 100 ml   Net 2600 ml     Weight: (!) 168.5 kg (371 lb 7.6 oz)  Current Diet Order   Procedures   • Diet Order Clear Liquid (no carbonation, no sugar, no straws)     Physical Exam   Constitutional: She is oriented to person, place, and time. No distress.   Cardiovascular: Regular rhythm.   Pulmonary/Chest: Effort normal. No respiratory distress.   Abdominal: Soft.   LUQ trocar site with scant bloody drainage - will change dressing before dispo; remainder of Tegaderms c/d/i   Neurological: She is alert and oriented to person, place, and time.   Skin: Skin is warm and dry.   Psychiatric: She has a normal mood and affect.   Nursing note and vitals reviewed.    Labs:  Recent Results (from the past 24 hour(s))   HCG Qualitative Ur    Collection Time: 19 11:30 AM   Result Value Ref Range    Beta-Hcg Urine Negative Negative   REFRACTOMETER SG    Collection Time: 19 11:30 AM   Result  Value Ref Range    Specific Gravity 1.031    ACCU-CHEK GLUCOSE    Collection Time: 08/05/19 12:35 PM   Result Value Ref Range    Glucose - Accu-Ck 79 65 - 99 mg/dL   Histology Request    Collection Time: 08/05/19  2:57 PM   Result Value Ref Range    Pathology Request Sent to Histo    CBC without Differential (blood)    Collection Time: 08/06/19  3:40 AM   Result Value Ref Range    WBC 22.0 (H) 4.8 - 10.8 K/uL    RBC 5.77 (H) 4.20 - 5.40 M/uL    Hemoglobin 15.6 12.0 - 16.0 g/dL    Hematocrit 48.6 (H) 37.0 - 47.0 %    MCV 84.2 81.4 - 97.8 fL    MCH 27.0 27.0 - 33.0 pg    MCHC 32.1 (L) 33.6 - 35.0 g/dL    RDW 40.8 35.9 - 50.0 fL    Platelet Count 150 (L) 164 - 446 K/uL    MPV 13.1 (H) 9.0 - 12.9 fL   Basic Metabolic Panel (BMP)    Collection Time: 08/06/19  3:40 AM   Result Value Ref Range    Sodium 136 135 - 145 mmol/L    Potassium 3.8 3.6 - 5.5 mmol/L    Chloride 93 (L) 96 - 112 mmol/L    Co2 22 20 - 33 mmol/L    Glucose 110 (H) 65 - 99 mg/dL    Bun 12 8 - 22 mg/dL    Creatinine 0.53 0.50 - 1.40 mg/dL    Calcium 8.8 8.5 - 10.5 mg/dL    Anion Gap 21.0 (H) 0.0 - 11.9   ESTIMATED GFR    Collection Time: 08/06/19  3:40 AM   Result Value Ref Range    GFR If African American >60 >60 mL/min/1.73 m 2    GFR If Non African American >60 >60 mL/min/1.73 m 2     Medical Decision Making, by Problem:  There are no active hospital problems to display for this patient.    Plan:  D/c IVF, saline lock.  Gastric bypass clears today, advance diet as tolerated to gastric bypass full liquids on morning of POD#2.  Incentive spirometry Q hr while awake, continue at home.  Up ad adi.  Ok to Shower over Tegaderms today; remove Tegaderms on  8/09/19.  No baths or soaks x 14 days.  No driving x 4-5 days.  No lifting > 15 lbs until after post-op appt.  D/C home when alert, comfortable, ambulatory, and tolerating PO well.  Pt Counseled re: I.S., diet, activity, home med's, continued use of incentive spirometer at home,  and wound care.  Begin PPI  at home (all home med's larger in size than eraser head should be crushed or changed to liquid form x 2-3 weeks, while on liquid diet).  Hold MVI/supplements until after postop appointment.  F/U with Dr. Ganser ~ 1-2 weeks.       Quality Measures:  Quality-Core Measures   Reviewed items::  Labs reviewed and Medications reviewed  Brandon catheter::  No Brandon  DVT prophylaxis pharmacological::  Enoxaparin (Lovenox)  DVT prophylaxis - mechanical:  SCDs  Ulcer Prophylaxis::  Yes      Discussed patient condition with RN, Patient and Dr. Ganser

## 2019-08-06 NOTE — CARE PLAN
Problem: Safety  Goal: Will remain free from injury  Outcome: PROGRESSING AS EXPECTED  Note:   Bed locked and in lowest position.  Call light and personal belongings are within reach.       Problem: Pain Management  Goal: Pain level will decrease to patient's comfort goal  Outcome: PROGRESSING AS EXPECTED  Note:   Will encourage pt to ambulate, Will medicate per MAR, and will provide non-pharmacologic pain relief measures.

## 2019-08-06 NOTE — PROGRESS NOTES
Bedside report received.  Assessment complete.  A&O x 4. Patient calls appropriately.  Patient up with standby assist.    Patient has 8/10 pain. Medicated per MAR  Patient is nauseous, medicated per MAR, not actively vomiting at this time. Tolerating gastric clears diet.  4 Surgical lap stabs with gauze and tegaderm.  + void, - flatus, - BM.  Patient denies SOB.  SCD's on.  Patient pleasant with staff and resting in bed with family at bedside.  Review plan with of care with patient. Call light and personal belongings with in reach. Hourly rounding in place. All needs met at this time.

## 2019-08-06 NOTE — DIETARY
NUTRITION SERVICES: BMI - Pt with BMI >40 (=Body mass index is 51.81 kg/m².), morbid obesity. Weight loss counseling not appropriate in acute care setting. RECOMMEND - Referral to outpatient nutrition services for weight management after D/C.

## 2019-08-06 NOTE — DISCHARGE INSTRUCTIONS
Discharge Instructions    Discharged to home by car with relative. Discharged via wheelchair, hospital escort: Yes.  Special equipment needed: Not Applicable    Be sure to schedule a follow-up appointment with your primary care doctor or any specialists as instructed.     Discharge Plan:   Influenza Vaccine Indication: Patient Refuses    I understand that a diet low in cholesterol, fat, and sodium is recommended for good health. Unless I have been given specific instructions below for another diet, I accept this instruction as my diet prescription.   Other diet: Gastric    Special Instructions: None    · Is patient discharged on Warfarin / Coumadin?   No     Depression / Suicide Risk    As you are discharged from this [facility] facility, it is important to learn how to keep safe from harming yourself.    Recognize the warning signs:  · Abrupt changes in personality, positive or negative- including increase in energy   · Giving away possessions  · Change in eating patterns- significant weight changes-  positive or negative  · Change in sleeping patterns- unable to sleep or sleeping all the time   · Unwillingness or inability to communicate  · Depression  · Unusual sadness, discouragement and loneliness  · Talk of wanting to die  · Neglect of personal appearance   · Rebelliousness- reckless behavior  · Withdrawal from people/activities they love  · Confusion- inability to concentrate     If you or a loved one observes any of these behaviors or has concerns about self-harm, here's what you can do:  · Talk about it- your feelings and reasons for harming yourself  · Remove any means that you might use to hurt yourself (examples: pills, rope, extension cords, firearm)  · Get professional help from the community (Mental Health, Substance Abuse, psychological counseling)  · Do not be alone:Call your Safe Contact- someone whom you trust who will be there for you.  · Call your local CRISIS HOTLINE 260-8156 or  313.754.1827  · Call your local Children's Mobile Crisis Response Team [place] (290) 165-5228 or www.Ufree.Olery  · Call the toll free National Suicide Prevention Hotlines   · National Suicide Prevention Lifeline 848-834-VPDF (8719)  · National Hope Line Network 800-SUICIDE (220-9545)    Gastric bypass clears today, advance diet as tolerated to gastric bypass full liquids on morning of POD#2.  Incentive spirometry Q hr while awake, continue at home.  Up ad adi.  Ok to Shower over Tegaderms today; remove Tegaderms on  8/09/19.  No baths or soaks x 14 days.  No driving x 4-5 days.  No lifting > 15 lbs until after post-op appt.  D/C home when alert, comfortable, ambulatory, and tolerating PO well.  Pt Counseled re: I.S., diet, activity, home med's, continued use of incentive spirometer at home,  and wound care.  Begin PPI at home (all home med's larger in size than eraser head should be crushed or changed to liquid form x 2-3 weeks, while on liquid diet).  Hold MVI/supplements until after postop appointment.  F/U with Dr. Ganser ~ 1-2 weeks.  Sleeve Gastrectomy, Care After  Refer to this sheet in the next few weeks. These instructions provide you with information on caring for yourself after your procedure. Your surgeon may also give you more specific instructions. Your treatment has been planned according to current medical practices, but problems sometimes occur. Call your surgeon if you have any problems or questions after your procedure.  HOME CARE INSTRUCTIONS  · Get plenty of rest, but move around frequently for short periods or take short walks as directed by your surgeon. Increase your activities gradually.  · Only take over-the-counter or prescription medicines as directed by your surgeon.  · Keep incision areas clean and dry. Remove or change any bandages (dressings) only as directed by your surgeon. You may have skin adhesive strips or glue over the incision areas. Do not take the strips or the  glue off. They will fall off on their own.  · Check your incisions and surrounding areas daily for any redness, swelling, or drainage of fluid.  · Take showers once your surgeon approves. Until then, only take sponge baths. Pat incisions dry. Do not rub incisions with a washcloth or towel. Do not take tub baths or go swimming until your surgeon approves. Do not put anything on your incision to clean it unless directed to do so by your surgeon.  · Limit activities as directed by your surgeon. You will need to avoid strenuous activity, heavy lifting, and pushing or pulling things with your arms for several weeks. Do not lift anything heavier than 10 lb (4.5 kg).  · Perform deep breathing exercises and coughing as directed by your surgeon. This helps prevent a lung infection.  · Do not drive until your surgeon approves.  · Follow all of the dietary instructions provided by your surgeon or dietitian. You will receive specific instructions on the type, size, and timing of meals.  ¨   ¨   ¨ You may need to stay on a liquid diet for some time after the surgery.  ¨ Drink fluids frequently. You should drink 1 oz of fluid as often as you can.  ¨ Take vitamin, calcium, and protein supplements as directed by your surgeon.  · If you have a drain from the incision area, make sure you:  ¨   ¨   ¨ Keep the area of the drain clean and dry.  ¨ Empty the drain and record the amount of fluid daily.  · Talk with your surgeon about when you may return to work and about your exercise routine.    · Keep all follow-up appointments with your surgeon and dietitian.  SEEK MEDICAL CARE IF:  · Your pain is not controlled with medicine.  · You have a fever.  · You have shaking chills.  · You notice any redness, skin irritation, swelling, or drainage of fluid (other than light red) in the incision area.  · Your drain gets pulled out accidentally.    · Your drain contains bright red blood, green fluid, or fluid that has a foul smell.  SEEK  IMMEDIATE MEDICAL CARE IF:  · You have difficulty breathing.  · You have severe calf pain.  MAKE SURE YOU:  · Understand these instructions.  · Will watch your condition.  · Will get help right away if you are not doing well or get worse.     This information is not intended to replace advice given to you by your health care provider. Make sure you discuss any questions you have with your health care provider.     Document Released: 10/14/2010 Document Revised: 08/20/2014 Document Reviewed: 05/02/2014  Elsevier Interactive Patient Education ©2016 [employer]

## 2019-08-06 NOTE — CARE PLAN
Problem: Communication  Goal: The ability to communicate needs accurately and effectively will improve  Outcome: PROGRESSING AS EXPECTED  Note:   Patient and family oriented to call light system

## 2019-08-06 NOTE — PROGRESS NOTES
Patient arrived form PACU.   Patient ambulated from [place] to bed, is currently standing at edge of bed, talking with family.   Patient reports 7/10 pain. Discussed pain medication options. Medicated per MAR.

## 2019-08-31 ENCOUNTER — APPOINTMENT (OUTPATIENT)
Dept: RADIOLOGY | Facility: MEDICAL CENTER | Age: 24
DRG: 439 | End: 2019-08-31
Attending: EMERGENCY MEDICINE
Payer: COMMERCIAL

## 2019-08-31 ENCOUNTER — HOSPITAL ENCOUNTER (INPATIENT)
Facility: MEDICAL CENTER | Age: 24
LOS: 3 days | DRG: 439 | End: 2019-09-03
Attending: EMERGENCY MEDICINE | Admitting: HOSPITALIST
Payer: COMMERCIAL

## 2019-08-31 DIAGNOSIS — R63.0 POOR APPETITE: ICD-10-CM

## 2019-08-31 DIAGNOSIS — K83.1 COMMON BILE DUCT (CBD) OBSTRUCTION: ICD-10-CM

## 2019-08-31 DIAGNOSIS — R10.9 ACUTE ABDOMINAL PAIN: ICD-10-CM

## 2019-08-31 DIAGNOSIS — E86.0 DEHYDRATION: ICD-10-CM

## 2019-08-31 DIAGNOSIS — E87.20 LACTIC ACIDOSIS: ICD-10-CM

## 2019-08-31 DIAGNOSIS — K85.90 ACUTE PANCREATITIS, UNSPECIFIED COMPLICATION STATUS, UNSPECIFIED PANCREATITIS TYPE: ICD-10-CM

## 2019-08-31 DIAGNOSIS — R51.9 ACUTE NONINTRACTABLE HEADACHE, UNSPECIFIED HEADACHE TYPE: ICD-10-CM

## 2019-08-31 LAB
ABO + RH BLD: NORMAL
ABO GROUP BLD: NORMAL
ALBUMIN SERPL BCP-MCNC: 4.2 G/DL (ref 3.2–4.9)
ALBUMIN/GLOB SERPL: 1.2 G/DL
ALP SERPL-CCNC: 99 U/L (ref 30–99)
ALT SERPL-CCNC: 53 U/L (ref 2–50)
ANION GAP SERPL CALC-SCNC: 19 MMOL/L (ref 0–11.9)
APTT PPP: 34.4 SEC (ref 24.7–36)
AST SERPL-CCNC: 32 U/L (ref 12–45)
BASOPHILS # BLD AUTO: 0.6 % (ref 0–1.8)
BASOPHILS # BLD: 0.06 K/UL (ref 0–0.12)
BILIRUB SERPL-MCNC: 0.8 MG/DL (ref 0.1–1.5)
BLD GP AB SCN SERPL QL: NORMAL
BUN SERPL-MCNC: 13 MG/DL (ref 8–22)
CALCIUM SERPL-MCNC: 9.6 MG/DL (ref 8.5–10.5)
CHLORIDE SERPL-SCNC: 101 MMOL/L (ref 96–112)
CO2 SERPL-SCNC: 20 MMOL/L (ref 20–33)
CREAT SERPL-MCNC: 0.8 MG/DL (ref 0.5–1.4)
EOSINOPHIL # BLD AUTO: 0.21 K/UL (ref 0–0.51)
EOSINOPHIL NFR BLD: 2.2 % (ref 0–6.9)
ERYTHROCYTE [DISTWIDTH] IN BLOOD BY AUTOMATED COUNT: 43.6 FL (ref 35.9–50)
GLOBULIN SER CALC-MCNC: 3.5 G/DL (ref 1.9–3.5)
GLUCOSE SERPL-MCNC: 75 MG/DL (ref 65–99)
HCG SERPL QL: NEGATIVE
HCT VFR BLD AUTO: 48.3 % (ref 37–47)
HGB BLD-MCNC: 15.5 G/DL (ref 12–16)
IMM GRANULOCYTES # BLD AUTO: 0.02 K/UL (ref 0–0.11)
IMM GRANULOCYTES NFR BLD AUTO: 0.2 % (ref 0–0.9)
INR PPP: 1.09 (ref 0.87–1.13)
LACTATE BLD-SCNC: 2.2 MMOL/L (ref 0.5–2)
LACTATE BLD-SCNC: 2.6 MMOL/L (ref 0.5–2)
LIPASE SERPL-CCNC: 406 U/L (ref 11–82)
LYMPHOCYTES # BLD AUTO: 2.1 K/UL (ref 1–4.8)
LYMPHOCYTES NFR BLD: 21.6 % (ref 22–41)
MCH RBC QN AUTO: 27.1 PG (ref 27–33)
MCHC RBC AUTO-ENTMCNC: 32.1 G/DL (ref 33.6–35)
MCV RBC AUTO: 84.3 FL (ref 81.4–97.8)
MONOCYTES # BLD AUTO: 1.05 K/UL (ref 0–0.85)
MONOCYTES NFR BLD AUTO: 10.8 % (ref 0–13.4)
NEUTROPHILS # BLD AUTO: 6.26 K/UL (ref 2–7.15)
NEUTROPHILS NFR BLD: 64.6 % (ref 44–72)
NRBC # BLD AUTO: 0 K/UL
NRBC BLD-RTO: 0 /100 WBC
PLATELET # BLD AUTO: 121 K/UL (ref 164–446)
PMV BLD AUTO: 14.1 FL (ref 9–12.9)
POTASSIUM SERPL-SCNC: 3.7 MMOL/L (ref 3.6–5.5)
PROT SERPL-MCNC: 7.7 G/DL (ref 6–8.2)
PROTHROMBIN TIME: 14.3 SEC (ref 12–14.6)
RBC # BLD AUTO: 5.73 M/UL (ref 4.2–5.4)
RH BLD: NORMAL
SODIUM SERPL-SCNC: 140 MMOL/L (ref 135–145)
WBC # BLD AUTO: 9.7 K/UL (ref 4.8–10.8)

## 2019-08-31 PROCEDURE — 99222 1ST HOSP IP/OBS MODERATE 55: CPT | Performed by: HOSPITALIST

## 2019-08-31 PROCEDURE — 700111 HCHG RX REV CODE 636 W/ 250 OVERRIDE (IP): Performed by: EMERGENCY MEDICINE

## 2019-08-31 PROCEDURE — 93005 ELECTROCARDIOGRAM TRACING: CPT | Performed by: EMERGENCY MEDICINE

## 2019-08-31 PROCEDURE — 700111 HCHG RX REV CODE 636 W/ 250 OVERRIDE (IP): Performed by: HOSPITALIST

## 2019-08-31 PROCEDURE — 700117 HCHG RX CONTRAST REV CODE 255: Performed by: EMERGENCY MEDICINE

## 2019-08-31 PROCEDURE — 85025 COMPLETE CBC W/AUTO DIFF WBC: CPT

## 2019-08-31 PROCEDURE — 83690 ASSAY OF LIPASE: CPT

## 2019-08-31 PROCEDURE — 770006 HCHG ROOM/CARE - MED/SURG/GYN SEMI*

## 2019-08-31 PROCEDURE — 86900 BLOOD TYPING SEROLOGIC ABO: CPT

## 2019-08-31 PROCEDURE — 96375 TX/PRO/DX INJ NEW DRUG ADDON: CPT

## 2019-08-31 PROCEDURE — 700102 HCHG RX REV CODE 250 W/ 637 OVERRIDE(OP): Performed by: HOSPITALIST

## 2019-08-31 PROCEDURE — 85610 PROTHROMBIN TIME: CPT

## 2019-08-31 PROCEDURE — 80053 COMPREHEN METABOLIC PANEL: CPT

## 2019-08-31 PROCEDURE — 83605 ASSAY OF LACTIC ACID: CPT | Mod: 91

## 2019-08-31 PROCEDURE — 99285 EMERGENCY DEPT VISIT HI MDM: CPT

## 2019-08-31 PROCEDURE — 36415 COLL VENOUS BLD VENIPUNCTURE: CPT

## 2019-08-31 PROCEDURE — 71045 X-RAY EXAM CHEST 1 VIEW: CPT

## 2019-08-31 PROCEDURE — 76705 ECHO EXAM OF ABDOMEN: CPT

## 2019-08-31 PROCEDURE — 86850 RBC ANTIBODY SCREEN: CPT

## 2019-08-31 PROCEDURE — 86901 BLOOD TYPING SEROLOGIC RH(D): CPT

## 2019-08-31 PROCEDURE — 74177 CT ABD & PELVIS W/CONTRAST: CPT

## 2019-08-31 PROCEDURE — 85730 THROMBOPLASTIN TIME PARTIAL: CPT

## 2019-08-31 PROCEDURE — 96374 THER/PROPH/DIAG INJ IV PUSH: CPT

## 2019-08-31 PROCEDURE — A9270 NON-COVERED ITEM OR SERVICE: HCPCS | Performed by: HOSPITALIST

## 2019-08-31 PROCEDURE — 87040 BLOOD CULTURE FOR BACTERIA: CPT

## 2019-08-31 PROCEDURE — 84703 CHORIONIC GONADOTROPIN ASSAY: CPT

## 2019-08-31 PROCEDURE — 700105 HCHG RX REV CODE 258: Performed by: EMERGENCY MEDICINE

## 2019-08-31 RX ORDER — OMEPRAZOLE 20 MG/1
20 CAPSULE, DELAYED RELEASE ORAL DAILY
Status: ON HOLD | COMMUNITY
End: 2019-09-29

## 2019-08-31 RX ORDER — MORPHINE SULFATE 4 MG/ML
2 INJECTION, SOLUTION INTRAMUSCULAR; INTRAVENOUS
Status: DISCONTINUED | OUTPATIENT
Start: 2019-08-31 | End: 2019-09-03 | Stop reason: HOSPADM

## 2019-08-31 RX ORDER — SODIUM CHLORIDE, SODIUM LACTATE, POTASSIUM CHLORIDE, CALCIUM CHLORIDE 600; 310; 30; 20 MG/100ML; MG/100ML; MG/100ML; MG/100ML
1000 INJECTION, SOLUTION INTRAVENOUS ONCE
Status: COMPLETED | OUTPATIENT
Start: 2019-08-31 | End: 2019-08-31

## 2019-08-31 RX ORDER — BISACODYL 10 MG
10 SUPPOSITORY, RECTAL RECTAL
Status: DISCONTINUED | OUTPATIENT
Start: 2019-08-31 | End: 2019-09-03 | Stop reason: HOSPADM

## 2019-08-31 RX ORDER — OXYCODONE HYDROCHLORIDE 5 MG/1
2.5 TABLET ORAL
Status: DISCONTINUED | OUTPATIENT
Start: 2019-08-31 | End: 2019-09-03 | Stop reason: HOSPADM

## 2019-08-31 RX ORDER — OXYCODONE HYDROCHLORIDE 5 MG/1
5 TABLET ORAL
Status: DISCONTINUED | OUTPATIENT
Start: 2019-08-31 | End: 2019-09-03 | Stop reason: HOSPADM

## 2019-08-31 RX ORDER — METOCLOPRAMIDE HYDROCHLORIDE 5 MG/ML
10 INJECTION INTRAMUSCULAR; INTRAVENOUS ONCE
Status: COMPLETED | OUTPATIENT
Start: 2019-08-31 | End: 2019-08-31

## 2019-08-31 RX ORDER — OMEPRAZOLE 20 MG/1
20 CAPSULE, DELAYED RELEASE ORAL DAILY
Status: DISCONTINUED | OUTPATIENT
Start: 2019-09-01 | End: 2019-09-03 | Stop reason: HOSPADM

## 2019-08-31 RX ORDER — PROMETHAZINE HYDROCHLORIDE 12.5 MG/1
12.5-25 SUPPOSITORY RECTAL EVERY 4 HOURS PRN
Status: DISCONTINUED | OUTPATIENT
Start: 2019-08-31 | End: 2019-09-03 | Stop reason: HOSPADM

## 2019-08-31 RX ORDER — ONDANSETRON 2 MG/ML
4 INJECTION INTRAMUSCULAR; INTRAVENOUS EVERY 4 HOURS PRN
Status: DISCONTINUED | OUTPATIENT
Start: 2019-08-31 | End: 2019-09-03 | Stop reason: HOSPADM

## 2019-08-31 RX ORDER — DIPHENHYDRAMINE HYDROCHLORIDE 50 MG/ML
25 INJECTION INTRAMUSCULAR; INTRAVENOUS ONCE
Status: COMPLETED | OUTPATIENT
Start: 2019-08-31 | End: 2019-08-31

## 2019-08-31 RX ORDER — PROCHLORPERAZINE EDISYLATE 5 MG/ML
5-10 INJECTION INTRAMUSCULAR; INTRAVENOUS EVERY 4 HOURS PRN
Status: DISCONTINUED | OUTPATIENT
Start: 2019-08-31 | End: 2019-09-03 | Stop reason: HOSPADM

## 2019-08-31 RX ORDER — SODIUM CHLORIDE 9 MG/ML
1000 INJECTION, SOLUTION INTRAVENOUS ONCE
Status: COMPLETED | OUTPATIENT
Start: 2019-08-31 | End: 2019-08-31

## 2019-08-31 RX ORDER — SODIUM CHLORIDE, SODIUM LACTATE, POTASSIUM CHLORIDE, CALCIUM CHLORIDE 600; 310; 30; 20 MG/100ML; MG/100ML; MG/100ML; MG/100ML
2000 INJECTION, SOLUTION INTRAVENOUS ONCE
Status: COMPLETED | OUTPATIENT
Start: 2019-08-31 | End: 2019-09-01

## 2019-08-31 RX ORDER — ONDANSETRON 4 MG/1
4 TABLET, ORALLY DISINTEGRATING ORAL EVERY 6 HOURS PRN
COMMUNITY
End: 2019-09-26

## 2019-08-31 RX ORDER — PROMETHAZINE HYDROCHLORIDE 25 MG/1
12.5-25 TABLET ORAL EVERY 4 HOURS PRN
Status: DISCONTINUED | OUTPATIENT
Start: 2019-08-31 | End: 2019-09-03 | Stop reason: HOSPADM

## 2019-08-31 RX ORDER — HEPARIN SODIUM 5000 [USP'U]/ML
5000 INJECTION, SOLUTION INTRAVENOUS; SUBCUTANEOUS EVERY 8 HOURS
Status: DISCONTINUED | OUTPATIENT
Start: 2019-08-31 | End: 2019-08-31

## 2019-08-31 RX ORDER — POLYETHYLENE GLYCOL 3350 17 G/17G
1 POWDER, FOR SOLUTION ORAL
Status: DISCONTINUED | OUTPATIENT
Start: 2019-08-31 | End: 2019-09-03 | Stop reason: HOSPADM

## 2019-08-31 RX ORDER — ACETAMINOPHEN 325 MG/1
650 TABLET ORAL EVERY 6 HOURS PRN
Status: DISCONTINUED | OUTPATIENT
Start: 2019-08-31 | End: 2019-09-03 | Stop reason: HOSPADM

## 2019-08-31 RX ORDER — AMOXICILLIN 250 MG
2 CAPSULE ORAL 2 TIMES DAILY
Status: DISCONTINUED | OUTPATIENT
Start: 2019-08-31 | End: 2019-09-03 | Stop reason: HOSPADM

## 2019-08-31 RX ORDER — ONDANSETRON 4 MG/1
4 TABLET, ORALLY DISINTEGRATING ORAL EVERY 4 HOURS PRN
Status: DISCONTINUED | OUTPATIENT
Start: 2019-08-31 | End: 2019-09-03 | Stop reason: HOSPADM

## 2019-08-31 RX ADMIN — METOCLOPRAMIDE 10 MG: 5 INJECTION, SOLUTION INTRAMUSCULAR; INTRAVENOUS at 19:07

## 2019-08-31 RX ADMIN — IOHEXOL 80 ML: 350 INJECTION, SOLUTION INTRAVENOUS at 19:59

## 2019-08-31 RX ADMIN — SODIUM CHLORIDE 1000 ML: 9 INJECTION, SOLUTION INTRAVENOUS at 23:43

## 2019-08-31 RX ADMIN — DIPHENHYDRAMINE HYDROCHLORIDE 25 MG: 50 INJECTION INTRAMUSCULAR; INTRAVENOUS at 19:07

## 2019-08-31 RX ADMIN — SENNOSIDES, DOCUSATE SODIUM 2 TABLET: 50; 8.6 TABLET, FILM COATED ORAL at 23:34

## 2019-08-31 RX ADMIN — SODIUM CHLORIDE, POTASSIUM CHLORIDE, SODIUM LACTATE AND CALCIUM CHLORIDE 1000 ML: 600; 310; 30; 20 INJECTION, SOLUTION INTRAVENOUS at 19:13

## 2019-08-31 RX ADMIN — ENOXAPARIN SODIUM 40 MG: 100 INJECTION SUBCUTANEOUS at 23:34

## 2019-08-31 ASSESSMENT — ENCOUNTER SYMPTOMS
PHOTOPHOBIA: 0
NAUSEA: 1
SHORTNESS OF BREATH: 0
FEVER: 0
DIARRHEA: 0
WHEEZING: 0
VOMITING: 0
CHILLS: 0
HEADACHES: 0
DEPRESSION: 0
TINGLING: 0
PALPITATIONS: 0
FOCAL WEAKNESS: 0
DIZZINESS: 0
SORE THROAT: 0
ABDOMINAL PAIN: 1
MYALGIAS: 0
COUGH: 0

## 2019-08-31 ASSESSMENT — PATIENT HEALTH QUESTIONNAIRE - PHQ9
SUM OF ALL RESPONSES TO PHQ9 QUESTIONS 1 AND 2: 1
9. THOUGHTS THAT YOU WOULD BE BETTER OFF DEAD, OR OF HURTING YOURSELF: NOT AT ALL
7. TROUBLE CONCENTRATING ON THINGS, SUCH AS READING THE NEWSPAPER OR WATCHING TELEVISION: NOT AT ALL
1. LITTLE INTEREST OR PLEASURE IN DOING THINGS: NOT AT ALL
5. POOR APPETITE OR OVEREATING: SEVERAL DAYS
8. MOVING OR SPEAKING SO SLOWLY THAT OTHER PEOPLE COULD HAVE NOTICED. OR THE OPPOSITE, BEING SO FIGETY OR RESTLESS THAT YOU HAVE BEEN MOVING AROUND A LOT MORE THAN USUAL: NOT AT ALL
3. TROUBLE FALLING OR STAYING ASLEEP OR SLEEPING TOO MUCH: NOT AT ALL
6. FEELING BAD ABOUT YOURSELF - OR THAT YOU ARE A FAILURE OR HAVE LET YOURSELF OR YOUR FAMILY DOWN: NOT AL ALL
SUM OF ALL RESPONSES TO PHQ QUESTIONS 1-9: 2
4. FEELING TIRED OR HAVING LITTLE ENERGY: NOT AT ALL
2. FEELING DOWN, DEPRESSED, IRRITABLE, OR HOPELESS: SEVERAL DAYS

## 2019-08-31 NOTE — ED TRIAGE NOTES
".  Chief Complaint   Patient presents with   • Post-Op Complications     gastric sleeve placed 8/5/19, N/V/poor appetite since     /99   Pulse 90   Temp 36.4 °C (97.5 °F) (Temporal)   Resp 16   Ht 1.803 m (5' 11\")   Wt (!) 154 kg (339 lb 8.1 oz)   SpO2 92% .    Ambulatory to triage for above, seen for post-op appointment this week and instructed to come to ED if symptoms persisted, taking Zofran at home with no relief, educated on triage process, placed in lobby, told to inform staff of any changes in condition.    "

## 2019-09-01 ENCOUNTER — APPOINTMENT (OUTPATIENT)
Dept: RADIOLOGY | Facility: MEDICAL CENTER | Age: 24
DRG: 439 | End: 2019-09-01
Attending: HOSPITALIST
Payer: COMMERCIAL

## 2019-09-01 PROBLEM — E87.6 HYPOKALEMIA: Status: ACTIVE | Noted: 2019-09-01

## 2019-09-01 LAB
ALBUMIN SERPL BCP-MCNC: 3.2 G/DL (ref 3.2–4.9)
ALBUMIN/GLOB SERPL: 1.3 G/DL
ALP SERPL-CCNC: 79 U/L (ref 30–99)
ALT SERPL-CCNC: 41 U/L (ref 2–50)
ANION GAP SERPL CALC-SCNC: 16 MMOL/L (ref 0–11.9)
AST SERPL-CCNC: 20 U/L (ref 12–45)
BILIRUB SERPL-MCNC: 0.8 MG/DL (ref 0.1–1.5)
BUN SERPL-MCNC: 12 MG/DL (ref 8–22)
CALCIUM SERPL-MCNC: 8.6 MG/DL (ref 8.5–10.5)
CHLORIDE SERPL-SCNC: 103 MMOL/L (ref 96–112)
CO2 SERPL-SCNC: 22 MMOL/L (ref 20–33)
CREAT SERPL-MCNC: 0.71 MG/DL (ref 0.5–1.4)
EKG IMPRESSION: NORMAL
ERYTHROCYTE [DISTWIDTH] IN BLOOD BY AUTOMATED COUNT: 43.6 FL (ref 35.9–50)
GLOBULIN SER CALC-MCNC: 2.5 G/DL (ref 1.9–3.5)
GLUCOSE SERPL-MCNC: 89 MG/DL (ref 65–99)
HCT VFR BLD AUTO: 43 % (ref 37–47)
HGB BLD-MCNC: 13.8 G/DL (ref 12–16)
MAGNESIUM SERPL-MCNC: 1.6 MG/DL (ref 1.5–2.5)
MCH RBC QN AUTO: 27 PG (ref 27–33)
MCHC RBC AUTO-ENTMCNC: 32.1 G/DL (ref 33.6–35)
MCV RBC AUTO: 84.1 FL (ref 81.4–97.8)
PLATELET # BLD AUTO: 108 K/UL (ref 164–446)
POTASSIUM SERPL-SCNC: 3 MMOL/L (ref 3.6–5.5)
PROT SERPL-MCNC: 5.7 G/DL (ref 6–8.2)
RBC # BLD AUTO: 5.11 M/UL (ref 4.2–5.4)
SODIUM SERPL-SCNC: 141 MMOL/L (ref 135–145)
WBC # BLD AUTO: 8.9 K/UL (ref 4.8–10.8)

## 2019-09-01 PROCEDURE — 700105 HCHG RX REV CODE 258: Performed by: HOSPITALIST

## 2019-09-01 PROCEDURE — 700111 HCHG RX REV CODE 636 W/ 250 OVERRIDE (IP): Performed by: HOSPITALIST

## 2019-09-01 PROCEDURE — 85027 COMPLETE CBC AUTOMATED: CPT

## 2019-09-01 PROCEDURE — 36415 COLL VENOUS BLD VENIPUNCTURE: CPT

## 2019-09-01 PROCEDURE — 80053 COMPREHEN METABOLIC PANEL: CPT

## 2019-09-01 PROCEDURE — 770006 HCHG ROOM/CARE - MED/SURG/GYN SEMI*

## 2019-09-01 PROCEDURE — 700102 HCHG RX REV CODE 250 W/ 637 OVERRIDE(OP): Performed by: HOSPITALIST

## 2019-09-01 PROCEDURE — 83735 ASSAY OF MAGNESIUM: CPT

## 2019-09-01 PROCEDURE — 74181 MRI ABDOMEN W/O CONTRAST: CPT

## 2019-09-01 PROCEDURE — A9270 NON-COVERED ITEM OR SERVICE: HCPCS | Performed by: HOSPITALIST

## 2019-09-01 PROCEDURE — 99232 SBSQ HOSP IP/OBS MODERATE 35: CPT | Performed by: HOSPITALIST

## 2019-09-01 RX ORDER — SODIUM CHLORIDE, SODIUM LACTATE, POTASSIUM CHLORIDE, CALCIUM CHLORIDE 600; 310; 30; 20 MG/100ML; MG/100ML; MG/100ML; MG/100ML
INJECTION, SOLUTION INTRAVENOUS CONTINUOUS
Status: DISCONTINUED | OUTPATIENT
Start: 2019-09-01 | End: 2019-09-03 | Stop reason: HOSPADM

## 2019-09-01 RX ORDER — MAGNESIUM SULFATE HEPTAHYDRATE 40 MG/ML
2 INJECTION, SOLUTION INTRAVENOUS ONCE
Status: DISCONTINUED | OUTPATIENT
Start: 2019-09-01 | End: 2019-09-02

## 2019-09-01 RX ORDER — LORAZEPAM 2 MG/ML
1 INJECTION INTRAMUSCULAR
Status: DISCONTINUED | OUTPATIENT
Start: 2019-09-01 | End: 2019-09-03 | Stop reason: HOSPADM

## 2019-09-01 RX ORDER — POTASSIUM CHLORIDE 7.45 MG/ML
10 INJECTION INTRAVENOUS
Status: COMPLETED | OUTPATIENT
Start: 2019-09-01 | End: 2019-09-01

## 2019-09-01 RX ADMIN — POTASSIUM CHLORIDE 10 MEQ: 10 INJECTION, SOLUTION INTRAVENOUS at 08:01

## 2019-09-01 RX ADMIN — ENOXAPARIN SODIUM 40 MG: 100 INJECTION SUBCUTANEOUS at 17:10

## 2019-09-01 RX ADMIN — PROCHLORPERAZINE EDISYLATE 10 MG: 5 INJECTION INTRAMUSCULAR; INTRAVENOUS at 08:00

## 2019-09-01 RX ADMIN — SODIUM CHLORIDE, POTASSIUM CHLORIDE, SODIUM LACTATE AND CALCIUM CHLORIDE: 600; 310; 30; 20 INJECTION, SOLUTION INTRAVENOUS at 15:19

## 2019-09-01 RX ADMIN — PROCHLORPERAZINE EDISYLATE 10 MG: 5 INJECTION INTRAMUSCULAR; INTRAVENOUS at 22:11

## 2019-09-01 RX ADMIN — ONDANSETRON 4 MG: 4 TABLET, ORALLY DISINTEGRATING ORAL at 07:15

## 2019-09-01 RX ADMIN — PROCHLORPERAZINE EDISYLATE 10 MG: 5 INJECTION INTRAMUSCULAR; INTRAVENOUS at 15:19

## 2019-09-01 RX ADMIN — ENOXAPARIN SODIUM 40 MG: 100 INJECTION SUBCUTANEOUS at 05:54

## 2019-09-01 RX ADMIN — OMEPRAZOLE 20 MG: 20 CAPSULE, DELAYED RELEASE ORAL at 05:53

## 2019-09-01 RX ADMIN — POTASSIUM CHLORIDE 10 MEQ: 10 INJECTION, SOLUTION INTRAVENOUS at 09:48

## 2019-09-01 RX ADMIN — SENNOSIDES, DOCUSATE SODIUM 2 TABLET: 50; 8.6 TABLET, FILM COATED ORAL at 05:53

## 2019-09-01 RX ADMIN — SODIUM CHLORIDE, POTASSIUM CHLORIDE, SODIUM LACTATE AND CALCIUM CHLORIDE 2000 ML: 600; 310; 30; 20 INJECTION, SOLUTION INTRAVENOUS at 00:54

## 2019-09-01 ASSESSMENT — COGNITIVE AND FUNCTIONAL STATUS - GENERAL
MOBILITY SCORE: 24
SUGGESTED CMS G CODE MODIFIER DAILY ACTIVITY: CH
SUGGESTED CMS G CODE MODIFIER MOBILITY: CH
DAILY ACTIVITIY SCORE: 24

## 2019-09-01 ASSESSMENT — LIFESTYLE VARIABLES
HAVE PEOPLE ANNOYED YOU BY CRITICIZING YOUR DRINKING: NO
EVER_SMOKED: NEVER
TOTAL SCORE: 0
CONSUMPTION TOTAL: NEGATIVE
HAVE YOU EVER FELT YOU SHOULD CUT DOWN ON YOUR DRINKING: NO
EVER_SMOKED: NEVER
TOTAL SCORE: 0
AVERAGE NUMBER OF DAYS PER WEEK YOU HAVE A DRINK CONTAINING ALCOHOL: 0
TOTAL SCORE: 0
ALCOHOL_USE: YES
ON A TYPICAL DAY WHEN YOU DRINK ALCOHOL HOW MANY DRINKS DO YOU HAVE: 0
EVER HAD A DRINK FIRST THING IN THE MORNING TO STEADY YOUR NERVES TO GET RID OF A HANGOVER: NO
HOW MANY TIMES IN THE PAST YEAR HAVE YOU HAD 5 OR MORE DRINKS IN A DAY: 0
EVER FELT BAD OR GUILTY ABOUT YOUR DRINKING: NO

## 2019-09-01 ASSESSMENT — ENCOUNTER SYMPTOMS
FEVER: 0
DIZZINESS: 0
DIARRHEA: 0
ABDOMINAL PAIN: 0
MYALGIAS: 0
EYE DISCHARGE: 0
CHILLS: 0
FALLS: 0
VOMITING: 0
DEPRESSION: 0
NAUSEA: 1
COUGH: 0
SORE THROAT: 0
EYE PAIN: 0
SHORTNESS OF BREATH: 0
FOCAL WEAKNESS: 0
HEADACHES: 0

## 2019-09-01 NOTE — PROGRESS NOTES
2 RN skin check completed with GANESH Najera.   Devices in place: PIV.  Skin assessed under devices: yes.  Confirmed pressure ulcers found on: None.  New potential pressure ulcers noted on: None. Wound consult placed: N/A.  The following interventions in place: Bariatric bed, pt encouraged to reposition frequently    Heels pink/blanching  Sacrum pink/blanching  5 well healing incisions from recent gastric sleeve noted on abdomen  Small heat rash noted under R breast  Elbows pink/blanching  Ears pink/blanching    All skin noted to be intact, pink, and blanching.

## 2019-09-01 NOTE — PROGRESS NOTES
Pt stable. Reports nausea, Zofran un affective, compazine administered. Denies pain. K+ currently running. Awaiting MRCP. Will continue to monitor.

## 2019-09-01 NOTE — ASSESSMENT & PLAN NOTE
Lipase decreasing   Right upper quadrant ultrasound shows a common bile duct dilatation concerning for possible distal obstructing stone.  MRCP obtained which shows no acute findings.   Differential diagnoses include gallstone pancreatitis.      Continue IV fluid hydration   Continue to receive symptomatic management for pain and nausea.  No complaints of nausea this am and will attempt liquid diet

## 2019-09-01 NOTE — CARE PLAN
Problem: Nutritional:  Goal: Achieve adequate nutritional intake  Description  Diet advancement with PO >50% meals/supplements (to provide six small meals/day)   Outcome: NOT MET

## 2019-09-01 NOTE — ED PROVIDER NOTES
Scribed for Prabhu Beck M.D. by Marco Antonio Pelletier. 8/31/2019, 6:17 PM.    CHIEF COMPLAINT  Chief Complaint   Patient presents with   • Post-Op Complications     gastric sleeve placed 8/5/19, N/V/poor appetite since       HPI    Primary care provider: Justina Lagos D.N.P.  Means of arrival: Walk-in  History obtained from: Patient  History limited by: None    Kenzie Gray is a 23 y.o. female who presents with acute, worsening nausea and generalized fatigue and poor appetite onset 1 month ago. Patient states that on 8/5 of this year she had a gastric sleeve performed by Dr. Ganser and that since then has had decreased appetite, nausea, vomiting, chills, and headache. Denies any associated chest pain, fever, or cough.  She does report some mild generalized abdominal pain mostly localized to her epigastrium.  It is achy in nature.  Nonradiating.  There are no noted alleviating or exacerbating factors. Patient states that her post op follow up was last Thursday, but her labs were normal. She currently takes omeprazole, but was unable to keep it down today.  She has a dull generalized headache that feels like prior migraines, no thunderclap onset, no head injury recently.    REVIEW OF SYSTEMS  Constitutional: Negative for fever Positive for chills.   Respiratory: Negative for cough.    Cardiovascular: Negative for chest pain.   Gastrointestinal: Positive for nausea, vomiting, abdominal pain, and decreased appetite.   Neurological: Positive for headache.  All other systems reviewed and are negative.    PAST MEDICAL HISTORY   has a past medical history of Allergy, Bronchitis (2017), Depression, Gynecological disorder, Headache(784.0), Heart burn, Menses, irregular, Migraine, Migraines, Snoring, and Urinary incontinence.    PAST FAMILY HISTORY  Family History   Problem Relation Age of Onset   • Alcohol/Drug Mother    • Arthritis Maternal Aunt    • Alcohol/Drug Maternal Aunt    • Alcohol/Drug Paternal Aunt  "   • Lung Disease Paternal Grandmother    • Diabetes Paternal Grandmother    • Stroke Paternal Grandmother    • Cancer Paternal Grandfather        SOCIAL HISTORY  Social History     Tobacco Use   • Smoking status: Never Smoker   • Smokeless tobacco: Never Used   Substance and Sexual Activity   • Alcohol use: Yes     Comment: every few months   • Drug use: No     Types: Inhaled   • Sexual activity: Never       SURGICAL HISTORY   has a past surgical history that includes lap, kaylee restrict proc, longitudinal gas* (8/5/2019).    CURRENT MEDICATIONS  Home Medications     Reviewed by Chilo Cruz (Pharmacy Tech) on 08/31/19 at 2226  Med List Status: Complete   Medication Last Dose Status   omeprazole (PRILOSEC) 20 MG delayed-release capsule 8/31/2019 Active   ondansetron (ZOFRAN ODT) 4 MG TABLET DISPERSIBLE 8/31/2019 Active                ALLERGIES  No Known Allergies    PHYSICAL EXAM  VITAL SIGNS: /99   Pulse 90   Temp 36.4 °C (97.5 °F) (Temporal)   Resp 16   Ht 1.803 m (5' 11\")   Wt (!) 154 kg (339 lb 8.1 oz)   LMP 07/23/2019 (Approximate)   SpO2 92%   BMI 47.35 kg/m²    Pulse ox interpretation: On room air, I interpret this pulse ox as normal.  Constitutional: Lying down on stretcher in mild distress.  HEENT: Normocephalic, atraumatic. Posterior pharynx clear, mucous membranes dry.  Eyes:  EOMI. Normal sclerae.  Chest/Pulmonary: Clear to ausculation bilaterally, no wheezes or rhonchi.  Cardiovascular: Regular rate and rhythm, no murmur.   Abdomen: Soft, no rebound, guarding, or masses. Mild generalized abdominal tenderness worse in the epigastrium.   Back: No CVA or midline tenderness.   Musculoskeletal: No deformity or edema.  Neuro: Clear speech, normal coordination, cranial nerves II-XII grossly intact, no focal asymmetry or sensory deficits.   Psych: Normal mood, flat affect.  Skin: No rashes, warm and dry.    DIAGNOSTIC STUDIES / PROCEDURES    LABS & EKG  Results for orders placed or " performed during the hospital encounter of 08/31/19   CBC WITH DIFFERENTIAL   Result Value Ref Range    WBC 9.7 4.8 - 10.8 K/uL    RBC 5.73 (H) 4.20 - 5.40 M/uL    Hemoglobin 15.5 12.0 - 16.0 g/dL    Hematocrit 48.3 (H) 37.0 - 47.0 %    MCV 84.3 81.4 - 97.8 fL    MCH 27.1 27.0 - 33.0 pg    MCHC 32.1 (L) 33.6 - 35.0 g/dL    RDW 43.6 35.9 - 50.0 fL    Platelet Count 121 (L) 164 - 446 K/uL    MPV 14.1 (H) 9.0 - 12.9 fL    Neutrophils-Polys 64.60 44.00 - 72.00 %    Lymphocytes 21.60 (L) 22.00 - 41.00 %    Monocytes 10.80 0.00 - 13.40 %    Eosinophils 2.20 0.00 - 6.90 %    Basophils 0.60 0.00 - 1.80 %    Immature Granulocytes 0.20 0.00 - 0.90 %    Nucleated RBC 0.00 /100 WBC    Neutrophils (Absolute) 6.26 2.00 - 7.15 K/uL    Lymphs (Absolute) 2.10 1.00 - 4.80 K/uL    Monos (Absolute) 1.05 (H) 0.00 - 0.85 K/uL    Eos (Absolute) 0.21 0.00 - 0.51 K/uL    Baso (Absolute) 0.06 0.00 - 0.12 K/uL    Immature Granulocytes (abs) 0.02 0.00 - 0.11 K/uL    NRBC (Absolute) 0.00 K/uL   COMP METABOLIC PANEL   Result Value Ref Range    Sodium 140 135 - 145 mmol/L    Potassium 3.7 3.6 - 5.5 mmol/L    Chloride 101 96 - 112 mmol/L    Co2 20 20 - 33 mmol/L    Anion Gap 19.0 (H) 0.0 - 11.9    Glucose 75 65 - 99 mg/dL    Bun 13 8 - 22 mg/dL    Creatinine 0.80 0.50 - 1.40 mg/dL    Calcium 9.6 8.5 - 10.5 mg/dL    AST(SGOT) 32 12 - 45 U/L    ALT(SGPT) 53 (H) 2 - 50 U/L    Alkaline Phosphatase 99 30 - 99 U/L    Total Bilirubin 0.8 0.1 - 1.5 mg/dL    Albumin 4.2 3.2 - 4.9 g/dL    Total Protein 7.7 6.0 - 8.2 g/dL    Globulin 3.5 1.9 - 3.5 g/dL    A-G Ratio 1.2 g/dL   LIPASE   Result Value Ref Range    Lipase 406 (H) 11 - 82 U/L   LACTIC ACID   Result Value Ref Range    Lactic Acid 2.6 (H) 0.5 - 2.0 mmol/L   HCG QUAL SERUM   Result Value Ref Range    Beta-Hcg Qualitative Serum Negative Negative   APTT   Result Value Ref Range    APTT 34.4 24.7 - 36.0 sec   COD (ADULT)   Result Value Ref Range    ABO Grouping Only B     Rh Grouping Only POS      Antibody Screen-Cod NEG    PROTHROMBIN TIME (INR)   Result Value Ref Range    PT 14.3 12.0 - 14.6 sec    INR 1.09 0.87 - 1.13   ESTIMATED GFR   Result Value Ref Range    GFR If African American >60 >60 mL/min/1.73 m 2    GFR If Non African American >60 >60 mL/min/1.73 m 2   ABO Rh Confirm   Result Value Ref Range    ABO Rh Confirm B POS    LACTIC ACID   Result Value Ref Range    Lactic Acid 2.2 (H) 0.5 - 2.0 mmol/L   EKG (NOW)   Result Value Ref Range    Report       [facility] Emergency Dept.    Test Date:  2019  Pt Name:    SANG PINTO             Department: ER  MRN:        [id number]                      Room:       26  Gender:     Female                       Technician: [id number]  :        1995                   Requested By:PRABHU CARRASCO  Order #:    [id number]                    Reading MD: Prabhu Carrasco MD    Measurements  Intervals                                Axis  Rate:       62                           P:          13  TX:         128                          QRS:        6  QRSD:       98                           T:          -8  QT:         388  QTc:        394    Interpretive Statements  SINUS RHYTHM  ABNORMAL T, CONSIDER ISCHEMIA, INFERIOR LEADS  Compared to ECG 2019 09:32:12  Stable EKG no STEMI or strain or dysrhythmia  Electronically Signed On 2019 2:26:15 PDT by Prabhu Carrasco MD         RADIOLOGY  US-RUQ   Final Result         1.  Limited evaluation due to patient body habitus.   2.  Hepatomegaly with echogenic liver compatible with fatty change versus fibrosis   3.  Common bile duct dilatation, consider distal obstructing stone, stenosis, or ampullary lesion. Could be further evaluated with ERCP or MRCP.      CT-ABDOMEN-PELVIS WITH   Final Result         1.  No acute abnormality.   2.  Bilateral L4 pars defects      DX-CHEST-PORTABLE (1 VIEW)   Final Result      No acute cardiac or pulmonary abnormality is noted.      [id number]     (Results Pending)       COURSE & MEDICAL DECISION MAKING    This is a 23 y.o. female who presents with generalized malaise poor appetite fatigue epigastric abdominal pain and headache for the last months that she had a gastric sleeve surgery.    Differential Diagnosis includes but is not limited to:  Dehydration, post operative complication, electrolyte abnormality, gastritis, depression    ED Course:  6:17 PM - Patient seen and examined at bedside. Discussed plan of care, including ordering labs and medication. Patient agrees to the plan of care. The patient will be medicated with Reglan 10 mg, Benadryl 25 mg, and IV fluids. Ordered for DX-chest, CT-abdomen-pelvis, PT/INR, lactic acid, blood culture, urinalysis culture, HCG qual serum, APTT, COD adult, CBC with diff, CMP, lipase, and EKG to evaluate her symptoms.  The patient is clears clinically dehydrated she has dry mucous membranes and a poor appetite, I must rule out a surgical process before she can take by mouth and so I will treat her with a crystalloid fluid bolus.    Work-up mostly reassuring her labs are mostly stable except for an elevated lipase.  Electro lites are stable no severe acidosis.  CBC with normal white count no anemia.  Her albumin level is normal doubt severe malnutrition.  Mild lactic acidosis is present.  EKG reassuring no STEMI or strain or dysrhythmia, she is not pregnant.    8:28 PM - Surgery called.     9:02 PM - I discussed the patient's case and the above findings with Dr. Du (Surgery for Ganser) who recommends trying to rehydrate the patient and ready her for discharge. He does not feel the patient requires any surgical admission at this time. The patient will receive IV fluids for concerns of dehydration and remain NPO in case a surgical process is present.  She does have an elevated lipase level further evaluate with an ultrasound.    9:09 PM - Patient was reevaluated at bedside, she feels mild improvement with medication  and fluids, thus I feel she is having a positive response to parenteral rehydration. Discussed lab and radiology results with the patient and informed them that we are still waiting for the ultrasound, but her imaging and labs so far have been unremarkable. Pending final ultrasound read.    10:09 PM - Patient was reevaluated at bedside. Discussed radiology results with the patient and informed them that ultrasound indicated dilated common bile duct, and that I would like to admit them to the hospital at this time. Patient was understanding and agreeable to admission.  Concern for possible obstructing stone leading to potential gallstone pancreatitis.    10:10 PM - I discussed the patient's case and the above findings with Dr. Gonzalez (Hospitatlist) who agrees to admit the patient.  Patient is hemodynamically stable for admission of hospital in guarded condition.    Upon my evaluation, this patient had a high probability of imminent or life-threatening deterioration due to lactic acidosis, dehydration, potential for gallstone pancreatitis.     I personally provided 35 minutes of total critical care time outside of time spent on separately billable/documented procedures. This required my direct attention, intervention, and management which included the following:  -review of laboratory data  -review of radiology studies  -discussion with consultants: General surgery, hospitalist  -discussion with family and patient  -monitoring for potential decompensation  -IV fluid rehydration      Medications   omeprazole (PRILOSEC) capsule 20 mg (has no administration in time range)   senna-docusate (PERICOLACE or SENOKOT S) 8.6-50 MG per tablet 2 Tab (2 Tabs Oral Given 8/31/19 6328)     And   polyethylene glycol/lytes (MIRALAX) PACKET 1 Packet (has no administration in time range)     And   magnesium hydroxide (MILK OF MAGNESIA) suspension 30 mL (has no administration in time range)     And   bisacodyl (DULCOLAX) suppository 10 mg  (has no administration in time range)   acetaminophen (TYLENOL) tablet 650 mg (has no administration in time range)   Pharmacy Consult Request ...Pain Management Review 1 Each (has no administration in time range)     And   oxyCODONE immediate-release (ROXICODONE) tablet 2.5 mg (has no administration in time range)     And   oxyCODONE immediate-release (ROXICODONE) tablet 5 mg (has no administration in time range)     And   morphine (pf) 4 mg/ml injection 2 mg (has no administration in time range)   ondansetron (ZOFRAN) syringe/vial injection 4 mg (has no administration in time range)   ondansetron (ZOFRAN ODT) dispertab 4 mg (has no administration in time range)   promethazine (PHENERGAN) tablet 12.5-25 mg (has no administration in time range)   promethazine (PHENERGAN) suppository 12.5-25 mg (has no administration in time range)   prochlorperazine (COMPAZINE) injection 5-10 mg (has no administration in time range)   enoxaparin (LOVENOX) inj 40 mg (40 mg Subcutaneous Given 8/31/19 2334)   metoclopramide (REGLAN) injection 10 mg (10 mg Intravenous Given 8/31/19 1907)   diphenhydrAMINE (BENADRYL) injection 25 mg (25 mg Intravenous Given 8/31/19 1907)   lactated ringers infusion (BOLUS) (1,000 mL Intravenous New Bag 8/31/19 1913)   iohexol (OMNIPAQUE) 350 mg/mL (80 mL Intravenous Given 8/31/19 1959)   NS infusion 1,000 mL (1,000 mL Intravenous New Bag 8/31/19 2343)   lactated ringers infusion (2,000 mL Intravenous New Bag 9/1/19 0054)     FINAL IMPRESSION  1. Acute abdominal pain    2. Acute pancreatitis, unspecified complication status, unspecified pancreatitis type    3. Common bile duct (CBD) obstruction    4. Dehydration    5. Acute nonintractable headache, unspecified headache type    6. Poor appetite    7. Lactic acidosis        -ADMIT-      Pertinent Labs & Imaging studies reviewed and verified by myself, as well as nursing notes and the patient's past medical, family, and social histories (See chart for  details).    Results, exam findings, clinical impression, presumed diagnosis, treatment options, and plan for admission were discussed with the patient and family, and they verbalized understanding, agreed with, and appreciated the plan of care.    IMarco Antonio ([place]), am scribing for, and in the presence of, Prabhu Beck M.D..    Electronically signed by: Marco Antonio Pelletier ([place]), 8/31/2019    IPrabhu M.D. personally performed the services described in this documentation, as scribed by Marco Antonio Pelletier in my presence, and it is both accurate and complete.    Portions of this record were made with voice recognition software.  Despite my review, spelling/grammar/context errors may still remain.  Interpretation of this chart should be taken in this context.    C.    The note accurately reflects work and decisions made by me.  Prabhu Beck  9/1/2019  2:29 AM

## 2019-09-01 NOTE — PROGRESS NOTES
Patient arrived 2310 with transport via [employer]. She is A/Ox4 and on RA. She is ambulatory with a steady gait and was able to get off the gurney without assistance. No complaints of pain. Overdue meds given. IVF infusing. Unit procedures explained to patient. Welcome folder and quiet pack given. Patient diet is clear liquids but this RN is keeping pt NPO because she is due for an MRI of the abdomen and needs to be NPO at least 4 hours prior to procedure. Pt is a no fall risk and has bed in a low and locked position. No signs of distress noted.

## 2019-09-01 NOTE — CARE PLAN
Problem: Communication  Goal: The ability to communicate needs accurately and effectively will improve  Outcome: PROGRESSING AS EXPECTED  Intervention: Educate patient and significant other/support system about the plan of care, procedures, treatments, medications and allow for questions  Note:   Discussed plan of care for duration of shift, pt acknowledges understanding. All questions answered.     Problem: Safety  Goal: Will remain free from falls  Outcome: PROGRESSING AS EXPECTED  Intervention: Assess risk factors for falls  Note:   Patient is not considered a fall risk at this time. Patient educated to be careful of IV tubing while OOB and to wear treaded socks.

## 2019-09-01 NOTE — H&P
Hospital Medicine History & Physical Note    Date of Service  8/31/2019    Primary Care Physician  Justina Lagos D.N.P.    Consultants  None    Code Status  Full    Chief Complaint  Chief Complaint   Patient presents with   • Post-Op Complications     gastric sleeve placed 8/5/19, N/V/poor appetite since       History of Presenting Illness  23 y.o. female who presented on 8/31/2019 with nausea, vomiting, abdominal discomfort and loss of appetite.  This is a 23-year-old female with a history of morbid obesity who underwent gastric sleeve 4 weeks ago with Dr. Ganser of bariatric surgery.  She states that she was well for the first 2 weeks however in the last 2 weeks, she has had near constant nausea, occasional vomiting, loss of appetite, and transient abdominal pain.  Her last episode of pain was in the left upper quadrant earlier today which has since resolved.  By her report, she had a postop follow-up several days ago and reported that her labs were normal at that time.  Because of her inability to keep any thing down, she presented herself to the hospital for further evaluation.    Review of Systems  Review of Systems   Constitutional: Negative for chills and fever.        No appetite   HENT: Negative for congestion and sore throat.    Eyes: Negative for photophobia.   Respiratory: Negative for cough, shortness of breath and wheezing.    Cardiovascular: Negative for chest pain and palpitations.   Gastrointestinal: Positive for abdominal pain and nausea. Negative for diarrhea and vomiting.   Genitourinary: Negative for dysuria.   Musculoskeletal: Negative for myalgias.   Skin: Negative.    Neurological: Negative for dizziness, tingling, focal weakness and headaches.   Psychiatric/Behavioral: Negative for depression and suicidal ideas.       Past Medical History  Past Medical History:   Diagnosis Date   • Bronchitis 2017   • Allergy    • Depression     and anxiety   • Gynecological disorder     PCOS   •  "Headache(784.0)    • Heart burn    • Menses, irregular    • Migraine    • Migraines    • Snoring    • Urinary incontinence     \"month ago I had urgency from what my MD said was protein in my urine\"       Surgical History  Past Surgical History:   Procedure Laterality Date   • PB LAP, NOAM RESTRICT PROC, LONGITUDINAL GAS*  2019    Procedure: GASTRECTOMY, SLEEVE, LAPAROSCOPIC, HIATAL HERNIA REPAIR;  Surgeon: John H Ganser, M.D.;  Location: SURGERY [facility];  Service: General       Family History  Family History   Problem Relation Age of Onset   • Alcohol/Drug Mother    • Arthritis Maternal Aunt    • Alcohol/Drug Maternal Aunt    • Alcohol/Drug Paternal Aunt    • Lung Disease Paternal Grandmother    • Diabetes Paternal Grandmother    • Stroke Paternal Grandmother    • Cancer Paternal Grandfather        Social History  Social History     Tobacco Use   • Smoking status: Never Smoker   • Smokeless tobacco: Never Used   Substance Use Topics   • Alcohol use: Yes     Comment: every few months   • Drug use: No     Types: Inhaled       Allergies  No Known Allergies    Medications  No current facility-administered medications on file prior to encounter.      Current Outpatient Medications on File Prior to Encounter   Medication Sig Dispense Refill   • omeprazole (PRILOSEC) 20 MG delayed-release capsule Take 20 mg by mouth every day.     • ondansetron (ZOFRAN ODT) 4 MG TABLET DISPERSIBLE Take 4 mg by mouth every 6 hours as needed for Nausea.     • Levonorgest-Eth Estrad -Day (CAMRESE) 0.15-0.03 &0.01 MG Tab Take 1 Tab by mouth every day.         Physical Exam  Hemodynamics  Temp (24hrs), Av.4 °C (97.5 °F), Min:36.4 °C (97.5 °F), Max:36.4 °C (97.5 °F)   Temperature: 36.4 °C (97.5 °F)  Pulse  Av.3  Min: 67  Max: 90    Blood Pressure: 104/55      Respiratory      Respiration: 12, Pulse Oximetry: 99 %             Physical Exam   Constitutional: She is oriented to person, place, and time. No distress.   Morbidly " obese   HENT:   Head: Normocephalic and atraumatic.   Right Ear: External ear normal.   Left Ear: External ear normal.   Eyes: EOM are normal. Right eye exhibits no discharge. Left eye exhibits no discharge.   Neck: Neck supple. No JVD present.   Cardiovascular: Normal rate, regular rhythm and normal heart sounds.   Pulmonary/Chest: Effort normal and breath sounds normal. No respiratory distress. She exhibits no tenderness.   Abdominal: Soft. Bowel sounds are normal. She exhibits no distension. There is no tenderness.   Musculoskeletal: Normal range of motion. She exhibits no edema.   Neurological: She is alert and oriented to person, place, and time. No cranial nerve deficit.   Skin: Skin is warm and dry. She is not diaphoretic. No erythema.   Psychiatric: She has a normal mood and affect. Her behavior is normal.   Nursing note and vitals reviewed.    Capillary refill less than 3 seconds, distal pulses intact    Laboratory:  Recent Labs     08/31/19  1858   WBC 9.7   RBC 5.73*   HEMOGLOBIN 15.5   HEMATOCRIT 48.3*   MCV 84.3   MCH 27.1   MCHC 32.1*   RDW 43.6   PLATELETCT 121*   MPV 14.1*     Recent Labs     08/31/19  1858   SODIUM 140   POTASSIUM 3.7   CHLORIDE 101   CO2 20   GLUCOSE 75   BUN 13   CREATININE 0.80   CALCIUM 9.6     Recent Labs     08/31/19  1858   ALTSGPT 53*   ASTSGOT 32   ALKPHOSPHAT 99   TBILIRUBIN 0.8   LIPASE 406*   GLUCOSE 75     Recent Labs     08/31/19  1858   APTT 34.4   INR 1.09             Lab Results   Component Value Date    TROPONINI <0.01 08/19/2016       Imaging  Ct-abdomen-pelvis With    Result Date: 8/31/2019 8/31/2019 7:41 PM HISTORY/REASON FOR EXAM: Abdominal pain, unspecified; recent gastric sleeve; IV contrast only TECHNIQUE/EXAM DESCRIPTION:  CT abdomen and pelvis with contrast. Sequential axial CT images were obtained from lung bases to the proximal femurs after the uneventful administration of 80 cc Omnipaque 350 intravenous contrast. Low dose optimization technique was  utilized for this CT exam including automated exposure control and adjustment of the mA and/or kV according to patient size. COMPARISON:  None CT ABDOMEN FINDINGS: Limited views of the lungs appear within physiologic limits. The liver is normal in contour. The liver is normal in size. No intrahepatic biliary ductal dilatation is noted. The gallbladder appears within normal limits. The spleen is unremarkable. The pancreas is grossly normal. Bilateral adrenal glands appear within normal limits. The kidneys are unremarkable.  The ureters are normal in caliber along their visualized course. The colon appears within normal limits. The appendix appears within normal limits. The small bowel is unremarkable. Mild levoscoliosis is seen. Bilateral L4 pars defects are seen. CT PELVIS FINDINGS: The bladder is within normal limits. The uterus and adnexal structures appear within physiologic limits.     1.  No acute abnormality. 2.  Bilateral L4 pars defects    Dx-chest-portable (1 View)    Result Date: 8/31/2019 8/31/2019 6:48 PM HISTORY/REASON FOR EXAM:  Shortness of breath.. Upper abdominal pain. TECHNIQUE/EXAM DESCRIPTION AND NUMBER OF VIEWS: Single portable view of the chest. COMPARISON:  7/23/2019 FINDINGS: No subdiaphragmatic free air is identified. The cardiac silhouette is within normal limits. No infiltrates or consolidations are noted. No pleural effusion is noted.     No acute cardiac or pulmonary abnormality is noted.    Us-ruq    Result Date: 8/31/2019 8/31/2019 8:30 PM HISTORY/REASON FOR EXAM:  Pain; abnormal labs; Abdominal pain TECHNIQUE/EXAM DESCRIPTION AND NUMBER OF VIEWS:  Real-time sonography of the liver and biliary tree. COMPARISON: None FINDINGS: The liver is normal in contour. There is no evidence of solid mass lesion. The liver measures 16.39 cm. The liver is echogenic. The gallbladder is normal. There is no evidence of cholelithiasis. The gallbladder wall thickness measures 0.28 cm. There is no  pericholecystic fluid. The common duct measures 0.66 cm. The visualized pancreas is unremarkable. The visualized aorta is normal in caliber. Intrahepatic IVC is patent. The portal vein is patent with hepatopetal flow. The MPV measures 0.8 cm. The right kidney measures 12.8 cm. There is no ascites.     1.  Limited evaluation due to patient body habitus. 2.  Hepatomegaly with echogenic liver compatible with fatty change versus fibrosis 3.  Common bile duct dilatation, consider distal obstructing stone, stenosis, or ampullary lesion. Could be further evaluated with ERCP or MRCP.        Assessment/Plan:  Anticipate that patient will need greater than 2 midnights for management of the discussed medical issues.    * Pancreatitis  Assessment & Plan  Patient has elevated lipase and ultrasound which shows a common bile duct dilatation concerning for possible distal obstructing stone.  Differential diagnoses include gallstone pancreatitis.  Ultrasound of the right upper extremity quadrant is pending.  In the meantime, the patient is currently abdominal pain-free.  She will be admitted to the hospital for IV fluid hydration and I will start her on a clear liquid diet.  If she has any pain with initiation of clear liquids, then we will stop this and make her strictly n.p.o.  I have ordered an MRCP and pending these results, we may require a GI consultation.  She will continue to receive symptomatic management for pain and nausea.      Prophylaxis: Heparin for DVT prophylaxis, home PPI indicated, bowel protocol as needed

## 2019-09-01 NOTE — DIETARY
"Nutrition services: Day 1 of admit.  Kenzie Gray is a 23 y.o. female with admitting DX of Pancreatitis. Per MD notes, pt with known medical history of bronchitis, depression, anxiety, PCOS, irregular menses, migraine and urinary incontinence.  Patient underwent recent gastric sleeve placement with Dr. Ganser on 8/5/2018 accompanied with hiatal hernia repair.     Pt seen for high BMI with noted malnutrition Screening score of 3 , poor PO/unsure of recent wt loss. Pt reports unable to keep anything down for past week, UBW is 340#. Discussed small, frequent meals and tips for improving PO tolerance. Pt states she tries to do supplement drinks at home but did not tolerate Ensure. PT states she is tolerating clear liquids well so far.       Assessment:  Height: 180.3 cm (5' 11\")  Weight: (!) 154 kg (339 lb 8.1 oz)  Body mass index is 47.35 kg/m²., BMI classification: Extreme (class III) obesity.   Diet/Intake: Clear liquid.     Evaluation:   1. Pertinent Labs: K+ 3.0.   2. Pertinent Meds: Prilosec, Zofran PRN, Compazine/Phenergan PRN, Bowel protocol.    Malnutrition Risk: Does not meet criteria, pt s/p recent bariatric surgery worth noting.     Recommendations/Plan:  1. Advance diet per pt's tolerance, recommend six small meals daily.  2. Supplement orders received, will add Boost Breeze supplements while on clear liquids.  3. Encourage PO and document all intake as % taken in ADL's to provide interdisciplinary communication across all shifts.   4. Monitor weight.  5. Nutrition rep will continue to see patient for ongoing meal and snack preferences.             "

## 2019-09-01 NOTE — PROGRESS NOTES
[facility] Medicine Daily Progress Note    Date of Service  9/1/2019    Chief Complaint  23 y.o. female admitted 8/31/2019 with postop complications related to gastric sleeve placed 8/5/2019, complaints of nausea and vomiting.    Hospital Course    Patient is a 23-year-old female with known medical history of bronchitis, depression, anxiety, PCOS, irregular menses, migraine and urinary incontinence.  Patient underwent recent gastric sleeve placement with Dr. Ganser on 8/5/2018 accompanied with hiatal hernia repair.  Patient had returned home post surgical intervention and states she had been doing well for approximately 2 weeks.  After that time she started having constant nausea with occasional vomiting and increasing loss of appetite which caused her concern and she presented back to the emergency room for further evaluation.  CBC on admission shows no evidence of leukocytosis however platelets are 121.  CMP showed elevated ALT at 53 and elevated lipase at 406.  Patient was admitted to medical floor for continued IV fluid hydration and supportive measures.  CT of the abdomen showed no acute abnormalities.  Ultrasound of the right upper quadrant showed hepatomegaly with echogenic liver compatible with fatty change versus fibrosis, common bile duct dilation, consider distal obstructing stone, stenosis, or ampullary lesion with recommendations to be further evaluated with ERCP or MRCP.      Interval Problem Update  9/1- Patient resting in bed, states abdominal pain has resolved and only occasional nausea persists. Patient currently awaiting MRCP evaluation. Based on MRCP results, GI may be consulted. Patient to continue with symptom management for likely pancreatitis. IV fluids to continue.     Consultants/Specialty  None- pending results from MRCP     Code Status  Full     Disposition  TBD     Review of Systems  Review of Systems   Constitutional: Positive for malaise/fatigue. Negative for chills and fever.   HENT:  Negative for congestion and sore throat.    Eyes: Negative for pain and discharge.   Respiratory: Negative for cough and shortness of breath.    Cardiovascular: Negative for chest pain and leg swelling.   Gastrointestinal: Positive for nausea (intermittent ). Negative for abdominal pain, diarrhea and vomiting.   Genitourinary: Negative for dysuria, frequency and urgency.   Musculoskeletal: Negative for falls and myalgias.   Skin: Negative for rash.   Neurological: Negative for dizziness, focal weakness and headaches.   Psychiatric/Behavioral: Negative for depression.        Physical Exam  Temp:  [36.4 °C (97.5 °F)-36.8 °C (98.3 °F)] 36.8 °C (98.3 °F)  Pulse:  [61-90] 63  Resp:  [12-18] 15  BP: ()/(44-99) 98/44  SpO2:  [92 %-100 %] 95 %    Physical Exam   Constitutional: She is oriented to person, place, and time. She appears well-developed. She is cooperative. No distress.   Obese female resting in bed in no acute distress    HENT:   Head: Normocephalic.   Mouth/Throat: Oropharynx is clear and moist.   Eyes: Conjunctivae and lids are normal.   Neck: Neck supple. No tracheal tenderness present.   Cardiovascular: Normal rate and normal heart sounds. Exam reveals no gallop.   Pulmonary/Chest: Effort normal and breath sounds normal. No respiratory distress. She has no decreased breath sounds. She has no wheezes.   Abdominal: Soft. She exhibits no distension. Bowel sounds are decreased. There is generalized tenderness. There is no guarding.   Musculoskeletal:   GONZALEZ    Neurological: She is alert and oriented to person, place, and time. She has normal strength.   Skin: Skin is warm and dry. She is not diaphoretic.   Psychiatric: She has a normal mood and affect. Her speech is normal and behavior is normal.   Nursing note and vitals reviewed.      Fluids  No intake or output data in the 24 hours ending 09/01/19 0901    Laboratory  Recent Labs     08/31/19  1858 09/01/19  0258   WBC 9.7 8.9   RBC 5.73* 5.11    HEMOGLOBIN 15.5 13.8   HEMATOCRIT 48.3* 43.0   MCV 84.3 84.1   MCH 27.1 27.0   MCHC 32.1* 32.1*   RDW 43.6 43.6   PLATELETCT 121* 108*   MPV 14.1*  --      Recent Labs     08/31/19 1858 09/01/19  0258   SODIUM 140 141   POTASSIUM 3.7 3.0*   CHLORIDE 101 103   CO2 20 22   GLUCOSE 75 89   BUN 13 12   CREATININE 0.80 0.71   CALCIUM 9.6 8.6     Recent Labs     08/31/19  1858   APTT 34.4   INR 1.09               Imaging  US-RUQ   Final Result         1.  Limited evaluation due to patient body habitus.   2.  Hepatomegaly with echogenic liver compatible with fatty change versus fibrosis   3.  Common bile duct dilatation, consider distal obstructing stone, stenosis, or ampullary lesion. Could be further evaluated with ERCP or MRCP.      CT-ABDOMEN-PELVIS WITH   Final Result         1.  No acute abnormality.   2.  Bilateral L4 pars defects      DX-CHEST-PORTABLE (1 VIEW)   Final Result      No acute cardiac or pulmonary abnormality is noted.      [id number]    (Results Pending)        Assessment/Plan  * Pancreatitis  Assessment & Plan  Elevated lipase   Right upper quadrant ultrasound shows a common bile duct dilatation concerning for possible distal obstructing stone.    Differential diagnoses include gallstone pancreatitis.      Continue IV fluid hydration   MRCP and pending these results, we may require a GI consultation  Continue to receive symptomatic management for pain and nausea.    Hypokalemia  Assessment & Plan  Likely secondary to N/V, poor intake   Replacement initiated  Magnesium level pending  Trend labs        VTE prophylaxis: Lovenox

## 2019-09-02 LAB
ALBUMIN SERPL BCP-MCNC: 3 G/DL (ref 3.2–4.9)
ALBUMIN/GLOB SERPL: 1.2 G/DL
ALP SERPL-CCNC: 73 U/L (ref 30–99)
ALT SERPL-CCNC: 38 U/L (ref 2–50)
ANION GAP SERPL CALC-SCNC: 14 MMOL/L (ref 0–11.9)
AST SERPL-CCNC: 21 U/L (ref 12–45)
BILIRUB SERPL-MCNC: 0.8 MG/DL (ref 0.1–1.5)
BUN SERPL-MCNC: 8 MG/DL (ref 8–22)
CALCIUM SERPL-MCNC: 8.5 MG/DL (ref 8.5–10.5)
CHLORIDE SERPL-SCNC: 104 MMOL/L (ref 96–112)
CO2 SERPL-SCNC: 22 MMOL/L (ref 20–33)
CREAT SERPL-MCNC: 0.49 MG/DL (ref 0.5–1.4)
ERYTHROCYTE [DISTWIDTH] IN BLOOD BY AUTOMATED COUNT: 44 FL (ref 35.9–50)
GLOBULIN SER CALC-MCNC: 2.5 G/DL (ref 1.9–3.5)
GLUCOSE SERPL-MCNC: 85 MG/DL (ref 65–99)
HCT VFR BLD AUTO: 41 % (ref 37–47)
HGB BLD-MCNC: 13.5 G/DL (ref 12–16)
LIPASE SERPL-CCNC: 258 U/L (ref 11–82)
MAGNESIUM SERPL-MCNC: 1.5 MG/DL (ref 1.5–2.5)
MCH RBC QN AUTO: 27.9 PG (ref 27–33)
MCHC RBC AUTO-ENTMCNC: 32.9 G/DL (ref 33.6–35)
MCV RBC AUTO: 84.7 FL (ref 81.4–97.8)
PLATELET # BLD AUTO: 85 K/UL (ref 164–446)
PMV BLD AUTO: 14.7 FL (ref 9–12.9)
POTASSIUM SERPL-SCNC: 3 MMOL/L (ref 3.6–5.5)
PROT SERPL-MCNC: 5.5 G/DL (ref 6–8.2)
RBC # BLD AUTO: 4.84 M/UL (ref 4.2–5.4)
SODIUM SERPL-SCNC: 140 MMOL/L (ref 135–145)
WBC # BLD AUTO: 7.8 K/UL (ref 4.8–10.8)

## 2019-09-02 PROCEDURE — 83690 ASSAY OF LIPASE: CPT

## 2019-09-02 PROCEDURE — 85027 COMPLETE CBC AUTOMATED: CPT

## 2019-09-02 PROCEDURE — A9270 NON-COVERED ITEM OR SERVICE: HCPCS | Performed by: NURSE PRACTITIONER

## 2019-09-02 PROCEDURE — 700102 HCHG RX REV CODE 250 W/ 637 OVERRIDE(OP): Performed by: HOSPITALIST

## 2019-09-02 PROCEDURE — 80053 COMPREHEN METABOLIC PANEL: CPT

## 2019-09-02 PROCEDURE — 700105 HCHG RX REV CODE 258: Performed by: HOSPITALIST

## 2019-09-02 PROCEDURE — A9270 NON-COVERED ITEM OR SERVICE: HCPCS | Performed by: HOSPITALIST

## 2019-09-02 PROCEDURE — 83735 ASSAY OF MAGNESIUM: CPT

## 2019-09-02 PROCEDURE — 700111 HCHG RX REV CODE 636 W/ 250 OVERRIDE (IP): Performed by: HOSPITALIST

## 2019-09-02 PROCEDURE — 770006 HCHG ROOM/CARE - MED/SURG/GYN SEMI*

## 2019-09-02 PROCEDURE — 700102 HCHG RX REV CODE 250 W/ 637 OVERRIDE(OP): Performed by: NURSE PRACTITIONER

## 2019-09-02 PROCEDURE — 99232 SBSQ HOSP IP/OBS MODERATE 35: CPT | Performed by: HOSPITALIST

## 2019-09-02 PROCEDURE — 36415 COLL VENOUS BLD VENIPUNCTURE: CPT

## 2019-09-02 PROCEDURE — 700111 HCHG RX REV CODE 636 W/ 250 OVERRIDE (IP): Performed by: NURSE PRACTITIONER

## 2019-09-02 RX ORDER — POTASSIUM CHLORIDE 20 MEQ/1
40 TABLET, EXTENDED RELEASE ORAL 2 TIMES DAILY
Status: DISPENSED | OUTPATIENT
Start: 2019-09-02 | End: 2019-09-03

## 2019-09-02 RX ORDER — MAGNESIUM SULFATE HEPTAHYDRATE 40 MG/ML
4 INJECTION, SOLUTION INTRAVENOUS ONCE
Status: COMPLETED | OUTPATIENT
Start: 2019-09-02 | End: 2019-09-02

## 2019-09-02 RX ORDER — POTASSIUM CHLORIDE 20 MEQ/1
40 TABLET, EXTENDED RELEASE ORAL ONCE
Status: DISCONTINUED | OUTPATIENT
Start: 2019-09-02 | End: 2019-09-02

## 2019-09-02 RX ADMIN — MAGNESIUM SULFATE IN WATER 4 G: 40 INJECTION, SOLUTION INTRAVENOUS at 09:39

## 2019-09-02 RX ADMIN — MORPHINE SULFATE 2 MG: 4 INJECTION INTRAVENOUS at 19:30

## 2019-09-02 RX ADMIN — OXYCODONE HYDROCHLORIDE 2.5 MG: 5 TABLET ORAL at 17:55

## 2019-09-02 RX ADMIN — PROMETHAZINE HYDROCHLORIDE 12.5 MG: 25 TABLET ORAL at 14:56

## 2019-09-02 RX ADMIN — SODIUM CHLORIDE, POTASSIUM CHLORIDE, SODIUM LACTATE AND CALCIUM CHLORIDE: 600; 310; 30; 20 INJECTION, SOLUTION INTRAVENOUS at 01:50

## 2019-09-02 RX ADMIN — PROCHLORPERAZINE EDISYLATE 10 MG: 5 INJECTION INTRAMUSCULAR; INTRAVENOUS at 22:54

## 2019-09-02 RX ADMIN — POTASSIUM CHLORIDE 40 MEQ: 20 TABLET, EXTENDED RELEASE ORAL at 08:52

## 2019-09-02 RX ADMIN — OXYCODONE HYDROCHLORIDE 5 MG: 5 TABLET ORAL at 22:54

## 2019-09-02 RX ADMIN — OMEPRAZOLE 20 MG: 20 CAPSULE, DELAYED RELEASE ORAL at 05:41

## 2019-09-02 RX ADMIN — SODIUM CHLORIDE, POTASSIUM CHLORIDE, SODIUM LACTATE AND CALCIUM CHLORIDE: 600; 310; 30; 20 INJECTION, SOLUTION INTRAVENOUS at 18:20

## 2019-09-02 RX ADMIN — PROCHLORPERAZINE EDISYLATE 10 MG: 5 INJECTION INTRAMUSCULAR; INTRAVENOUS at 18:20

## 2019-09-02 ASSESSMENT — ENCOUNTER SYMPTOMS
DIZZINESS: 0
EYE DISCHARGE: 0
NAUSEA: 0
CHILLS: 0
COUGH: 0
EYE PAIN: 0
SORE THROAT: 0
DEPRESSION: 0
VOMITING: 0
FEVER: 0
DIARRHEA: 0
FOCAL WEAKNESS: 0
HEADACHES: 0
MYALGIAS: 0
FALLS: 0
ABDOMINAL PAIN: 0
SHORTNESS OF BREATH: 0

## 2019-09-02 NOTE — PROGRESS NOTES
[facility] Medicine Daily Progress Note    Date of Service  9/2/2019    Chief Complaint  23 y.o. female admitted 8/31/2019 with postop complications related to gastric sleeve placed 8/5/2019, complaints of nausea and vomiting.    Hospital Course    Patient is a 23-year-old female with known medical history of bronchitis, depression, anxiety, PCOS, irregular menses, migraine and urinary incontinence.  Patient underwent recent gastric sleeve placement with Dr. Ganser on 8/5/2018 accompanied with hiatal hernia repair.  Patient had returned home post surgical intervention and states she had been doing well for approximately 2 weeks.  After that time she started having constant nausea with occasional vomiting and increasing loss of appetite which caused her concern and she presented back to the emergency room for further evaluation.  CBC on admission shows no evidence of leukocytosis however platelets are 121.  CMP showed elevated ALT at 53 and elevated lipase at 406.  Patient was admitted to medical floor for continued IV fluid hydration and supportive measures.  CT of the abdomen showed no acute abnormalities.  Ultrasound of the right upper quadrant showed hepatomegaly with echogenic liver compatible with fatty change versus fibrosis, common bile duct dilation, consider distal obstructing stone, stenosis, or ampullary lesion with recommendations to be further evaluated with ERCP or MRCP.      Interval Problem Update  9/1- Patient resting in bed, states abdominal pain has resolved and only occasional nausea persists. Patient currently awaiting MRCP evaluation. Based on MRCP results, GI may be consulted. Patient to continue with symptom management for likely pancreatitis. IV fluids to continue.   9/2- Patient resting in bed, states nausea has not re-occurred this am and is attempting liquid diet. Lipase continues to decrease. MRCP shows no acute findings. Lipid panel from July of this year shows no significant elevation in  triglycerides, will obtain new lipid panel. Potassium remains low with decreased magnesium level. Replacement ordered and will re-evaluate labs in am. Will advance diet slowly, have discussed this with patient who is in agreement.     Consultants/Specialty  None- pending results from MRCP     Code Status  Full     Disposition  Likely discharge in am if electrolytes stable and patient tolerating full  liquid diet     Review of Systems  Review of Systems   Constitutional: Negative for chills, fever and malaise/fatigue.   HENT: Negative for congestion and sore throat.    Eyes: Negative for pain and discharge.   Respiratory: Negative for cough and shortness of breath.    Cardiovascular: Negative for chest pain and leg swelling.   Gastrointestinal: Negative for abdominal pain, diarrhea, nausea and vomiting.   Genitourinary: Negative for dysuria, frequency and urgency.   Musculoskeletal: Negative for falls and myalgias.   Skin: Negative for rash.   Neurological: Negative for dizziness, focal weakness and headaches.   Psychiatric/Behavioral: Negative for depression.        Physical Exam  Temp:  [36.2 °C (97.2 °F)-36.7 °C (98 °F)] 36.6 °C (97.8 °F)  Pulse:  [56-69] 69  Resp:  [14-16] 16  BP: (100-115)/(42-72) 104/42  SpO2:  [94 %-100 %] 96 %    Physical Exam   Constitutional: She is oriented to person, place, and time. She appears well-developed. She is cooperative. No distress.   Obese female resting in bed in no acute distress    HENT:   Head: Normocephalic.   Mouth/Throat: Oropharynx is clear and moist.   Eyes: Conjunctivae and lids are normal.   Neck: Neck supple. No tracheal tenderness present.   Cardiovascular: Normal rate and normal heart sounds. Exam reveals no gallop.   Pulmonary/Chest: Effort normal and breath sounds normal. No accessory muscle usage. No respiratory distress. She has no decreased breath sounds.   Abdominal: Soft. She exhibits no distension. Bowel sounds are decreased. There is no tenderness. There  is no guarding.   Musculoskeletal:   GONZALEZ    Neurological: She is alert and oriented to person, place, and time. She has normal strength.   Skin: Skin is warm and dry. She is not diaphoretic.   Psychiatric: She has a normal mood and affect. Her speech is normal and behavior is normal.   Nursing note and vitals reviewed.      Fluids    Intake/Output Summary (Last 24 hours) at 9/2/2019 0847  Last data filed at 9/2/2019 0145  Gross per 24 hour   Intake 1000 ml   Output --   Net 1000 ml       Laboratory  Recent Labs     08/31/19 1858 09/01/19 0258 09/02/19  0303   WBC 9.7 8.9 7.8   RBC 5.73* 5.11 4.84   HEMOGLOBIN 15.5 13.8 13.5   HEMATOCRIT 48.3* 43.0 41.0   MCV 84.3 84.1 84.7   MCH 27.1 27.0 27.9   MCHC 32.1* 32.1* 32.9*   RDW 43.6 43.6 44.0   PLATELETCT 121* 108* 85*   MPV 14.1*  --  14.7*     Recent Labs     08/31/19 1858 09/01/19 0258 09/02/19  0303   SODIUM 140 141 140   POTASSIUM 3.7 3.0* 3.0*   CHLORIDE 101 103 104   CO2 20 22 22   GLUCOSE 75 89 85   BUN 13 12 8   CREATININE 0.80 0.71 0.49*   CALCIUM 9.6 8.6 8.5     Recent Labs     08/31/19 1858   APTT 34.4   INR 1.09               Imaging  [id number]   Final Result      Unremarkable MRCP      US-RUQ   Final Result         1.  Limited evaluation due to patient body habitus.   2.  Hepatomegaly with echogenic liver compatible with fatty change versus fibrosis   3.  Common bile duct dilatation, consider distal obstructing stone, stenosis, or ampullary lesion. Could be further evaluated with ERCP or MRCP.      CT-ABDOMEN-PELVIS WITH   Final Result         1.  No acute abnormality.   2.  Bilateral L4 pars defects      DX-CHEST-PORTABLE (1 VIEW)   Final Result      No acute cardiac or pulmonary abnormality is noted.           Assessment/Plan  * Pancreatitis  Assessment & Plan  Lipase decreasing   Right upper quadrant ultrasound shows a common bile duct dilatation concerning for possible distal obstructing stone.  MRCP obtained which shows no acute findings.    Differential diagnoses include gallstone pancreatitis.      Continue IV fluid hydration   Continue to receive symptomatic management for pain and nausea.  No complaints of nausea this am and will attempt liquid diet     Hypokalemia  Assessment & Plan  Likely secondary to N/V, poor intake   Replacement initiated  Magnesium level decreased, replacement ordered   Trend labs        VTE prophylaxis: Lovenox

## 2019-09-02 NOTE — PROGRESS NOTES
Pt alert and oriented. Pt declines pain or nausea. Pt potassium and magnesium replaced. Pt tolerating medications. Tolerating liquid diet. Call light in reach.

## 2019-09-02 NOTE — PROGRESS NOTES
Patient A/Ox4, ambulatory with steady gait as witnessed by this RN. Pt took a walk earlier with her mom and sister. Receiving IVF of LR infusing at 125 mL/hr. PRN Compazine given for nausea. No complaints of pain. Patient on RA. No BM during shift. Per MD notes, pt is awaiting MRCP eval. Bed in a low and locked position. Hourly rounding done. No signs of distress noted.

## 2019-09-03 VITALS
BODY MASS INDEX: 41.02 KG/M2 | HEART RATE: 66 BPM | WEIGHT: 293 LBS | OXYGEN SATURATION: 96 % | HEIGHT: 71 IN | TEMPERATURE: 97.4 F | SYSTOLIC BLOOD PRESSURE: 141 MMHG | DIASTOLIC BLOOD PRESSURE: 65 MMHG | RESPIRATION RATE: 20 BRPM

## 2019-09-03 PROBLEM — K85.90 PANCREATITIS: Status: RESOLVED | Noted: 2019-08-31 | Resolved: 2019-09-03

## 2019-09-03 LAB
ALBUMIN SERPL BCP-MCNC: 3.2 G/DL (ref 3.2–4.9)
ALBUMIN/GLOB SERPL: 1.3 G/DL
ALP SERPL-CCNC: 83 U/L (ref 30–99)
ALT SERPL-CCNC: 42 U/L (ref 2–50)
ANION GAP SERPL CALC-SCNC: 14 MMOL/L (ref 0–11.9)
AST SERPL-CCNC: 21 U/L (ref 12–45)
BILIRUB SERPL-MCNC: 0.9 MG/DL (ref 0.1–1.5)
BUN SERPL-MCNC: 5 MG/DL (ref 8–22)
CALCIUM SERPL-MCNC: 8.5 MG/DL (ref 8.5–10.5)
CHLORIDE SERPL-SCNC: 104 MMOL/L (ref 96–112)
CHOLEST SERPL-MCNC: 118 MG/DL (ref 100–199)
CO2 SERPL-SCNC: 23 MMOL/L (ref 20–33)
CREAT SERPL-MCNC: 0.81 MG/DL (ref 0.5–1.4)
ERYTHROCYTE [DISTWIDTH] IN BLOOD BY AUTOMATED COUNT: 42.9 FL (ref 35.9–50)
GLOBULIN SER CALC-MCNC: 2.5 G/DL (ref 1.9–3.5)
GLUCOSE SERPL-MCNC: 112 MG/DL (ref 65–99)
HCT VFR BLD AUTO: 43.3 % (ref 37–47)
HDLC SERPL-MCNC: 29 MG/DL
HGB BLD-MCNC: 14.5 G/DL (ref 12–16)
LDLC SERPL CALC-MCNC: 75 MG/DL
MAGNESIUM SERPL-MCNC: 2 MG/DL (ref 1.5–2.5)
MCH RBC QN AUTO: 28 PG (ref 27–33)
MCHC RBC AUTO-ENTMCNC: 33.5 G/DL (ref 33.6–35)
MCV RBC AUTO: 83.6 FL (ref 81.4–97.8)
PLATELET # BLD AUTO: 88 K/UL (ref 164–446)
PMV BLD AUTO: 14.5 FL (ref 9–12.9)
POTASSIUM SERPL-SCNC: 3.3 MMOL/L (ref 3.6–5.5)
PROT SERPL-MCNC: 5.7 G/DL (ref 6–8.2)
RBC # BLD AUTO: 5.18 M/UL (ref 4.2–5.4)
SODIUM SERPL-SCNC: 141 MMOL/L (ref 135–145)
TRIGL SERPL-MCNC: 70 MG/DL (ref 0–149)
WBC # BLD AUTO: 11.4 K/UL (ref 4.8–10.8)

## 2019-09-03 PROCEDURE — A9270 NON-COVERED ITEM OR SERVICE: HCPCS | Performed by: HOSPITALIST

## 2019-09-03 PROCEDURE — 700102 HCHG RX REV CODE 250 W/ 637 OVERRIDE(OP): Performed by: HOSPITALIST

## 2019-09-03 PROCEDURE — 99239 HOSP IP/OBS DSCHRG MGMT >30: CPT | Performed by: INTERNAL MEDICINE

## 2019-09-03 PROCEDURE — 80053 COMPREHEN METABOLIC PANEL: CPT

## 2019-09-03 PROCEDURE — 700102 HCHG RX REV CODE 250 W/ 637 OVERRIDE(OP): Performed by: INTERNAL MEDICINE

## 2019-09-03 PROCEDURE — 700105 HCHG RX REV CODE 258: Performed by: HOSPITALIST

## 2019-09-03 PROCEDURE — 85027 COMPLETE CBC AUTOMATED: CPT

## 2019-09-03 PROCEDURE — 83735 ASSAY OF MAGNESIUM: CPT

## 2019-09-03 PROCEDURE — 36415 COLL VENOUS BLD VENIPUNCTURE: CPT

## 2019-09-03 PROCEDURE — 80061 LIPID PANEL: CPT

## 2019-09-03 PROCEDURE — A9270 NON-COVERED ITEM OR SERVICE: HCPCS | Performed by: INTERNAL MEDICINE

## 2019-09-03 PROCEDURE — 700111 HCHG RX REV CODE 636 W/ 250 OVERRIDE (IP): Performed by: HOSPITALIST

## 2019-09-03 RX ORDER — POTASSIUM CHLORIDE 20 MEQ/1
40 TABLET, EXTENDED RELEASE ORAL ONCE
Status: COMPLETED | OUTPATIENT
Start: 2019-09-03 | End: 2019-09-03

## 2019-09-03 RX ORDER — POTASSIUM CHLORIDE 20 MEQ/1
20 TABLET, EXTENDED RELEASE ORAL ONCE
Qty: 10 TAB | Refills: 0 | Status: SHIPPED | OUTPATIENT
Start: 2019-09-03 | End: 2019-09-03

## 2019-09-03 RX ADMIN — PROCHLORPERAZINE EDISYLATE 10 MG: 5 INJECTION INTRAMUSCULAR; INTRAVENOUS at 09:30

## 2019-09-03 RX ADMIN — SODIUM CHLORIDE, POTASSIUM CHLORIDE, SODIUM LACTATE AND CALCIUM CHLORIDE: 600; 310; 30; 20 INJECTION, SOLUTION INTRAVENOUS at 11:19

## 2019-09-03 RX ADMIN — SODIUM CHLORIDE, POTASSIUM CHLORIDE, SODIUM LACTATE AND CALCIUM CHLORIDE: 600; 310; 30; 20 INJECTION, SOLUTION INTRAVENOUS at 07:11

## 2019-09-03 RX ADMIN — POTASSIUM CHLORIDE 40 MEQ: 20 TABLET, EXTENDED RELEASE ORAL at 10:28

## 2019-09-03 ASSESSMENT — ENCOUNTER SYMPTOMS
NAUSEA: 0
FEVER: 0
VOMITING: 0
CHILLS: 0
HEARTBURN: 0
SHORTNESS OF BREATH: 0
ABDOMINAL PAIN: 0

## 2019-09-03 NOTE — PROGRESS NOTES
Pt. Unable to tolerate Kdur tablet, updated Dr. Owusu if needed to switch to different route to replace K. Per MD will await recheck in AM of Potassium since pt. Did have 1 dose in AM.

## 2019-09-03 NOTE — PROGRESS NOTES
Received pt. In bed awake, alert and orientedx4. Complaints of abdominal pain at the start of shift medicated per MAR. Pt. Has intact PIV running on LRS. Unable to tolerate food at the time of assessment cause of pain. VS ntoed. Needs attended.

## 2019-09-03 NOTE — PROGRESS NOTES
Pt reports increased pain in abdomen as well as nausea. Pt medicated for pain per MAR and medicated for nausea per MAR. Family at bedside. Pt offered hot pack to aid in comfort.

## 2019-09-03 NOTE — PROGRESS NOTES
Pt discharged home with family. Pt tolerated lunch with no resulting nausea or pain to abdomen. Pt discharge instructions discussed with pt and pt mom. All belongings accounted for. Pt refused escort downstairs, pt off unit with mother.

## 2019-09-03 NOTE — PROGRESS NOTES
Pt. Threw up the medication that was given this morning, unable to tolerate the med. Pt. Did state that she does have some problem sometime taking pills, informed day RN.

## 2019-09-03 NOTE — DISCHARGE INSTRUCTIONS
DISCHARGE INSTRUCTIONS PER Erick Saini M.D.    Please follow-up with primary care provider.   If you notice any symptoms of acute pain or any other medical problems please go to the nearest emergency room.      Discharge Instructions    Discharged to home by car with relative. Discharged via wheelchair, hospital escort: Yes.  Special equipment needed: Not Applicable    Be sure to schedule a follow-up appointment with your primary care doctor or any specialists as instructed.     Discharge Plan:   Diet Plan: Discussed  Activity Level: Discussed  Confirmed Follow up Appointment: Patient to Call and Schedule Appointment  Confirmed Symptoms Management: Discussed  Medication Reconciliation Updated: Yes  Influenza Vaccine Indication: Patient Refuses    I understand that a diet low in cholesterol, fat, and sodium is recommended for good health. Unless I have been given specific instructions below for another diet, I accept this instruction as my diet prescription.   Other diet: Soft GI    Special Instructions: None    · Is patient discharged on Warfarin / Coumadin?   No     Depression / Suicide Risk    As you are discharged from this [facility] facility, it is important to learn how to keep safe from harming yourself.    Recognize the warning signs:  · Abrupt changes in personality, positive or negative- including increase in energy   · Giving away possessions  · Change in eating patterns- significant weight changes-  positive or negative  · Change in sleeping patterns- unable to sleep or sleeping all the time   · Unwillingness or inability to communicate  · Depression  · Unusual sadness, discouragement and loneliness  · Talk of wanting to die  · Neglect of personal appearance   · Rebelliousness- reckless behavior  · Withdrawal from people/activities they love  · Confusion- inability to concentrate     If you or a loved one observes any of these behaviors or has concerns about self-harm, here's what you can  "do:  · Talk about it- your feelings and reasons for harming yourself  · Remove any means that you might use to hurt yourself (examples: pills, rope, extension cords, firearm)  · Get professional help from the community (Mental Health, Substance Abuse, psychological counseling)  · Do not be alone:Call your Safe Contact- someone whom you trust who will be there for you.  · Call your local CRISIS HOTLINE 881-7766 or 666-010-3021  · Call your local Children's Mobile Crisis Response Team [place] (447) 571-2441 or wwwTeabox  · Call the toll free National Suicide Prevention Hotlines   · National Suicide Prevention Lifeline 578-075-HQUH (1540)  · National Hope Line Network 800-SUICIDE (557-4549)      Diet Following Bariatric Surgery  The bariatric diet is designed to provide fluids and nourishment while promoting weight loss and healing after bariatric surgery. The diet is divided into 3 stages. Progress to each stage of the diet with your health care provider's approval.   WHAT DO I NEED TO KNOW ABOUT MY DIET FOLLOWING BARIATRIC SURGERY?  Your surgeon may have individual guidelines for you about specific foods or the progression of your diet. Follow your surgeon's guidelines. You will follow these general guidelines during all stages of your diet:  · Eat at set times.  · Allow 30-45 minutes for each meal.  · Take small bites. Chew your food until it is almost a liquid before swallowing it. Try setting down your utensils between bites to help yourself eat slower or make an \"eat slowly\" reminder sign.  · Do not drink liquids for 30 minutes before meals and for 30 minutes after meals.  · Drink between meals.  · Stop eating when you are full. If you feel tightness or pressure in your chest, that means you are full. Wait 30 minutes before you try to eat again.  · Take a chewable multivitamin daily in addition to other supplements as directed by your health care provider.  · Sip at least 48-64 oz of liquid, " preferably water, each day.  · Stay away from concentrated sweets with more than 10 g of sugar per serving.  · Protein is a very important part of the diet. Have protein with every meal when possible. Try eating your protein food first.  STAGE 1 BARIATRIC DIET  Stage 1 will begin after surgery and last until about 2 weeks after surgery or as directed by your health care provider. You will be on clear liquids immediately after surgery. After your health care provider approves, you will move to full liquids. You will eat at scheduled times during the day (for example at 8 AM, 12 noon, or 5 PM). You will also take a liquid protein supplement as recommended by your dietitian. Your dietitian will let you know how much and how often you can eat.   Diet Guidelines  · Limit intake to ¼ cup of solid foods and ½ cup of beverages per meal.  · You will need at least 60-80 g of protein daily or as determined by your dietitian. Guidelines for choosing a liquid protein supplement include:  ¨ At least 15 g of protein per 8 oz serving.  ¨ Less than 20 g total carbohydrate per 8 oz serving.  ¨ Less than 5 g fat per 8 oz serving.  · Drink at least 48 oz (1440 mL) of fluid daily, which includes your protein supplement.  · To get more protein, you can add 1 Tbsp non-fat dry milk powder to each ¼ cup skim milk.  · Avoid carbonated beverages, caffeine, alcohol, and concentrated sweets such as sugar, cakes, and cookies.  · Take a chewable multivitamin complete with iron. Discuss additional supplements with your health care provider or dietitian.  Beverages (½ cup total per meal)  · Decaffeinated coffee or tea.  · Drinks with less than 25 g of sugar per serving.  · Diet or sugar-free drinks.  · Powdered drinks.  · Sugar-free iced tea.  · Broth.  · Skim milk or lactose-free milk.  · Unsweetened, plain soy milk.  · Sugar-free gelatin dessert or frozen ice pops.  Full Liquid Foods (¼ cup total per meal)  · Protein-rich liquids (limit added  protein powder to 25-30 g per serving).  · Low-fat cream soup or soup that has been blended.  · Artificially sweetened yogurt.  · Sugar-free pudding.  · Blended low-fat cottage cheese.  · Plain yogurt or Greek yogurt (low-fat).  · Unsweetened applesauce.  · Hot wheat cereal, cream of rice, grits.  STAGE 2 BARIATRIC DIET (SOFT OR PUREED DIET)   Stage 2 starts about 2 weeks after surgery and lasts until about 4 weeks after surgery. During this stage, you will eat soft, moist, ground, diced, or pureed foods in small meals, 3-6 times a day. Focus on protein-rich foods. You will also drink a liquid protein supplement between meals 2 times a day. After a week of soft protein foods, you can begin to add other soft foods in addition to soft proteins. You should meet with your dietitian at this stage to begin preparation for Stage 3 of the bariatric diet.   Diet Guidelines  · Meals should not exceed ¾-1 cup total per meal.  · You will need to blend solid foods to the consistency of applesauce.  · Choose low-fat foods. Low-fat foods are foods with less than 5 g of fat per serving.  · Include a protein with every meal and snack. Eat the protein food first. Try to eat 60-80 g of protein per day when possible.  · Choose grains made from white or refined grain. Choices should have no more than 2 g of fiber per serving.  · Continue to eat mindfully and slowly and always listen to your body.  · Staying hydrated is very important during this stage. Full liquids from Stage 1 may be used for a meal or snack replacement.    · Slowly add other soft foods to your diet. Examples of soft foods that can be added to your diet are listed in the following section.  Soft Protein Foods   · Well-cooked beans and lentils.  · Eggs (scrambled, soft-boiled).  · Tofu and other soft soy products (tempeh or bean veggie burgers).  · Fish.  · Lean poultry, well cooked and soft. Can also try baby food chicken or turkey.  · Ground meats.  · Low-fat cottage  cheese.  · Hummus.  · Fat-free or low-fat yogurt.  · Gravy and light mayonnaise (to help moisten).  Other Soft Foods  · Soft fruit. This includes soft canned fruit in light syrup or natural juice, bananas, melons, peaches, pears, and strawberries.    · Well-cooked vegetables.    · Toast or crackers. Make sure these become soft by chewing them at least 20 times.  · Hot wheat cereal.    · Unflavored oatmeal.    · Baby food or toddler fruits and vegetables.    · Chicken or vegetable broth.    · Blended fruit smoothies.  STAGE 3 BARIATRIC DIET (REGULAR DIET)  This stage starts about 6-8 weeks after surgery and will continue to promote weight loss. You will be allowed to eat foods of various textures. Ask your dietitian to assist you in meal planning and additional behavioral strategies to make this final stage a long-term success.  Diet Guidelines  · Meals should not exceed ¾-1 cup. As you heal and advance, you may be able to eat a little more with each meal. Always listen to your body.      · Your diet should include foods from all the food groups.  · Slowly add recommended foods to your diet. See the following section for a list of recommended foods.  · Eat only at your chosen meal times.  · When you feel full, stop eating.  · Carbohydrates should be limited. Eat no more than 30 g per meal or 130 g per day. There are about 15-20 g of carbohydrates in 1 piece of bread or a medium piece of fruit.  · Stay hydrated. Drink at least 48-64 oz of noncarbonated, zero-calorie fluid per day. Water is the best choice.  · At first, avoid hard-to-digest foods like popcorn, nuts, celery, seeds, and the white parts of citrus fruits. With time you may be able to eat these foods.  · Take any vitamin supplements as directed by your health care provider.  · Do not fast or skip meals. If you are having a hard time eating, talk to your health care provider or dietitian.  WHAT FOODS CAN I EAT IN STAGE 3?  Grains   Choose whole grains when  possible; aim to get half of your total grains as whole grains. These include whole wheat breads, crackers, and pastas. Cold or hot cereals with no added sugars. Rice (brown or white).   Vegetables   Choose a variety of vegetables. All are allowed.   Fruits   Choose a variety of fruits. All are allowed.   Meat and Other Protein Foods   Choose lean sources of protein such as poultry, fish, and eggs. You may need to cook meats to tender at first. Smooth nut butters. Beans.  Dairy   Choose low-fat or nonfat dairy items (such as cheese, milk, and yogurt).  Beverages   Decaffeinated coffee. Caffeine-free tea. Sugar-free soft drinks without caffeine. Limit alcohol.   Condiments   All are acceptable. Choose low-fat and low-sodium when possible.   Sweets and Desserts   Low-fat, low-sugar options. As part of a healthy diet, everyone should limit added sugars.   Fat and Oil   Choose healthy fats such as olive oil, canola oil, and avocados.   The items listed above may not be a complete list of recommended foods or beverages. Contact your dietitian for more options.     This information is not intended to replace advice given to you by your health care provider. Make sure you discuss any questions you have with your health care provider.     Document Released: 06/23/2004 Document Revised: 12/23/2014 Document Reviewed: 10/22/2014  [employer] Patient Education ©2016 [employer]        Acute Pancreatitis  Acute pancreatitis is a condition in which the pancreas suddenly becomes irritated and swollen (has inflammation). The pancreas is a gland that is located behind the stomach. It produces enzymes that help to digest food. The pancreas also releases the hormones glucagon and insulin, which help to regulate blood sugar. Damage to the pancreas occurs when the digestive enzymes from the pancreas are activated before they are released into the intestine.  Most acute attacks last a couple of days and can cause serious  problems. Some people become dehydrated and develop low blood pressure. In severe cases, bleeding into the pancreas can lead to shock and can be life-threatening. The lungs, heart, and kidneys may fail.  What are the causes?  The most common causes of this condition are:  · Alcohol abuse.  · Gallstones.  Other causes include:  · Certain medicines.  · Exposure to certain chemicals.  · Infection.  · Damage caused by an accident (trauma).  · Abdominal surgery.  In some cases, the cause may not be known.  What are the signs or symptoms?  Symptoms of this condition include:  · Pain in the upper abdomen that may radiate to the back.  · Tenderness and swelling of the abdomen.  · Nausea and vomiting.  How is this diagnosed?  This condition may be diagnosed based on:  · A physical exam.  · Blood tests.  · Imaging tests, such as X-rays, CT scans, or an ultrasound of the abdomen.  How is this treated?  Treatment for this condition usually requires a stay in the hospital. Treatment may include:  · Pain medicine.  · Fluid replacement through an IV tube.  · Placing a tube in the stomach to remove stomach contents and to control vomiting (NG tube, or nasogastric tube).  · Not eating for 3-4 days. This gives the pancreas a rest, because enzymes are not being produced that can cause further damage.  · Antibiotic medicines, if your condition is caused by an infection.  · Surgery on the pancreas or gallbladder.  Follow these instructions at home:  Eating and drinking  · Follow instructions from your health care provider about diet. This may involve avoiding alcohol and decreasing the amount of fat in your diet.  · Eat smaller, more frequent meals. This reduces the amount of digestive fluids that the pancreas produces.  · Drink enough fluid to keep your urine clear or pale yellow.  · Do not drink alcohol if it caused your condition.  General instructions  · Take over-the-counter and prescription medicines only as told by your health  care provider.  · Do not use any tobacco products, such as cigarettes, chewing tobacco, and e-cigarettes. If you need help quitting, ask your health care provider.  · Get plenty of rest.  · If directed, check your blood sugar at home as told by your health care provider.  · Keep all follow-up visits as told by your health care provider. This is important.  Contact a health care provider if:  · You do not recover as quickly as expected.  · You develop new or worsening symptoms.  · You have persistent pain, weakness, or nausea.  · You recover and then have another episode of pain.  · You have a fever.  Get help right away if:  · You cannot eat or keep fluids down.  · Your pain becomes severe.  · Your skin or the white part of your eyes turns yellow (jaundice).  · You vomit.  · You feel dizzy or you faint.  · Your blood sugar is high (over 300 mg/dL).  This information is not intended to replace advice given to you by your health care provider. Make sure you discuss any questions you have with your health care provider.  Document Released: 12/18/2006 Document Revised: 04/26/2017 Document Reviewed: 09/20/2016  [employer] Interactive Patient Education © 2017 [employer]

## 2019-09-03 NOTE — PROGRESS NOTES
Pt mother informed this RN that pt is not eating due to fear of being nauseas and not being able to discharge. Pt educated on importance of being able to tolerate food in order to discharge home and recover. Phenergan administered and pt reported some relief of nausea. Pt able to tolerate some liquids at this time. Reports minimal pain to right side abdomen, denies pain med intervention at this time.

## 2019-09-03 NOTE — DISCHARGE SUMMARY
Discharge Summary    CHIEF COMPLAINT ON ADMISSION  Chief Complaint   Patient presents with   • Post-Op Complications     gastric sleeve placed 8/5/19, N/V/poor appetite since       Reason for Admission  Sent by urgent care    Admission Date  8/31/2019    CODE STATUS  Full Code    HPI & HOSPITAL COURSE    This is a 23 y.o. female with past medical history of bronchitis, depression, anxiety PCOS and irregular menses presented to the hospital with complaint of nausea and vomiting.  She underwent recent gastric sleeve placement with Dr. Ganser on August 5 accompanied with a hiatal hernia repair.  Patient returned home and post surgical she has been doing well for approximately 2 weeks and then she developed constant nausea with occasional vomiting and loss of appetite and she presented to emergency room for further evaluation.  On presentation she found to have leukocytosis with low platelet count.  Complete metabolic panel showed elevated liver enzymes with elevated lipase of 406.  She was started on IV fluid and supportive measure.  She denies CT abdomen which did not show any acute abnormalities.  She underwent ultrasound right upper quadrant and it showed hepatomegaly with echogenic liver compatible with fatty changes versus fibrosis, common bile duct dilation, consider distal obstructing stone, stenosis or epidural lesion with recommendation to be further evaluated with ERCP or MRCP.  MRCP was ordered and did not show any acute findings and did not show any evidence of obstruction.  Lipase trended down and her symptoms of nausea and vomiting has subsided.  Today I evaluated and examined her at the bedside and she has been tolerating clear liquid diet and advance her diet I discussed with her that if she will tolerate her diet she can be discharged and she needs to follow-up with PCP. She tolerated her lunch and I discharged her with recommendation with follow-up.  On the day of discharge she denies any acute  complaints and she feels ready to be discharged.         Therefore, she is discharged in fair and stable condition to home with close outpatient follow-up.    The patient met 2-midnight criteria for an inpatient stay at the time of discharge.    Discharge Date  09/03/19      FOLLOW UP ITEMS POST DISCHARGE  Primary care provider    DISCHARGE DIAGNOSES  Principal Problem (Resolved):    Pancreatitis POA: Unknown  Active Problems:    Hypokalemia POA: Unknown      FOLLOW UP    Justina Lagos D.N.P.  [address] [facility] [id number] [phone]    Schedule an appointment as soon as possible for a visit in 1 week      John H Ganser, M.D.  [address] [facility] [address]  [facility] [address]  [phone]    Schedule an appointment as soon as possible for a visit in 1 week        MEDICATIONS ON DISCHARGE     Medication List      START taking these medications      Instructions   potassium chloride SA 20 MEQ Tbcr  Commonly known as:  Kdur   Take 1 Tab by mouth Once for 1 dose.  Dose:  20 mEq        CONTINUE taking these medications      Instructions   ondansetron 4 MG Tbdp  Commonly known as:  ZOFRAN ODT   Take 4 mg by mouth every 6 hours as needed for Nausea.  Dose:  4 mg     PRILOSEC 20 MG delayed-release capsule  Generic drug:  omeprazole   Take 20 mg by mouth every day.  Dose:  20 mg            Allergies  No Known Allergies    DIET  Orders Placed This Encounter   Procedures   • Diet Order Low Fiber(GI Soft)     Standing Status:   Standing     Number of Occurrences:   1     Order Specific Question:   Diet:     Answer:   Low Fiber(GI Soft) [2]       ACTIVITY  As tolerated.  Weight bearing as tolerated    CONSULTATIONS  None    PROCEDURES  None    LABORATORY  Lab Results   Component Value Date    SODIUM 141 09/03/2019    POTASSIUM 3.3 (L) 09/03/2019    CHLORIDE 104 09/03/2019    CO2 23 09/03/2019    GLUCOSE 112 (H) 09/03/2019    BUN 5 (L) 09/03/2019    CREATININE 0.81 09/03/2019        Lab Results   Component Value Date     WBC 11.4 (H) 09/03/2019    HEMOGLOBIN 14.5 09/03/2019    HEMATOCRIT 43.3 09/03/2019    PLATELETCT 88 (L) 09/03/2019      [id number]-Q/O   Final Result      Unremarkable MRCP      US-RUQ   Final Result         1.  Limited evaluation due to patient body habitus.   2.  Hepatomegaly with echogenic liver compatible with fatty change versus fibrosis   3.  Common bile duct dilatation, consider distal obstructing stone, stenosis, or ampullary lesion. Could be further evaluated with ERCP or MRCP.      CT-ABDOMEN-PELVIS WITH   Final Result         1.  No acute abnormality.   2.  Bilateral L4 pars defects      DX-CHEST-PORTABLE (1 VIEW)   Final Result      No acute cardiac or pulmonary abnormality is noted.            Total time of the discharge process exceeds 34 minutes.

## 2019-09-03 NOTE — CARE PLAN
Problem: Pain Management  Goal: Pain level will decrease to patient's comfort goal  Outcome: PROGRESSING AS EXPECTED  PRN pain med given to alleviate pt. Pain.      Problem: Knowledge Deficit  Goal: Knowledge of disease process/condition, treatment plan, diagnostic tests, and medications will improve  Outcome: PROGRESSING AS EXPECTED  POC discussed with the pt. Answered pt. Questions regarding plan of care.

## 2019-09-03 NOTE — CARE PLAN
Problem: Nutritional:  Goal: Achieve adequate nutritional intake  Description  Diet advancement with PO >50% meals/supplements (to provide six small meals/day)   Outcome: PROGRESSING AS EXPECTED  Diet advanced from clear liquids to low fiber today. Pt vomited this am with 0700 meds, however now reporting reduced nausea and tolerance to clears.     RD will monitor for tolerance to low fiber diet

## 2019-09-03 NOTE — PROGRESS NOTES
Surgical Progress Note    Author: Levy Hoffman Date & Time created: 9/3/2019   2:10 PM     Interval Events:  Kenzie is a pleasant 22 y/o female well known to me, having undergone laparoscopic vertical sleeve gastrectomy with Dr. Ganser and myself about four weeks ago. She did well initially, for the first two weeks postoperatively. Then, for the last two weeks she began having progressively worsening nausea and low appetite. I saw her in the office last week and we discussed increasing fluid and protein intake. We discussed the possibility of admitting her to the hospital if her oral intake did not improve and/or symptoms worsened. Symptoms did, in fact, worsen and precipitated a visit to the ER. She was admitted. She was noted to have pancreatitis on labs. MRCP was essentially negative  Over the weekend symptoms have improved. She has started a soft diet and reports no dysphagia, nausea or abdominal pain today. She is passing flatus. She wants to go home.   Review of Systems   Constitutional: Negative for chills and fever.   Respiratory: Negative for shortness of breath.    Gastrointestinal: Negative for abdominal pain, heartburn, nausea and vomiting.   Skin: Negative for itching and rash.     Hemodynamics:  Temp (24hrs), Av.8 °C (98.2 °F), Min:36.3 °C (97.3 °F), Max:37.2 °C (99 °F)  Temperature: 36.3 °C (97.4 °F)  Pulse  Av.6  Min: 56  Max: 97   Blood Pressure: 141/65     Respiratory:    Respiration: 20, Pulse Oximetry: 96 %           Neuro:  GCS       Fluids:    Intake/Output Summary (Last 24 hours) at 9/3/2019 1410  Last data filed at 9/3/2019 1148  Gross per 24 hour   Intake 650 ml   Output --   Net 650 ml       Current Diet Order   Procedures   • Diet Order Low Fiber(GI Soft)     Physical Exam   Constitutional: She is oriented to person, place, and time. No distress.   Cardiovascular: Normal rate.   Pulmonary/Chest: Effort normal.   Abdominal: Soft.   Neurological: She is alert and oriented to  person, place, and time.   Skin: Skin is warm and dry.   Psychiatric: She has a normal mood and affect.   Nursing note and vitals reviewed.    Labs:  Recent Results (from the past 24 hour(s))   Lipid Profile    Collection Time: 09/03/19  1:57 AM   Result Value Ref Range    Cholesterol,Tot 118 100 - 199 mg/dL    Triglycerides 70 0 - 149 mg/dL    HDL 29 (A) >=40 mg/dL    LDL 75 <100 mg/dL   CBC WITHOUT DIFFERENTIAL    Collection Time: 09/03/19  1:57 AM   Result Value Ref Range    WBC 11.4 (H) 4.8 - 10.8 K/uL    RBC 5.18 4.20 - 5.40 M/uL    Hemoglobin 14.5 12.0 - 16.0 g/dL    Hematocrit 43.3 37.0 - 47.0 %    MCV 83.6 81.4 - 97.8 fL    MCH 28.0 27.0 - 33.0 pg    MCHC 33.5 (L) 33.6 - 35.0 g/dL    RDW 42.9 35.9 - 50.0 fL    Platelet Count 88 (L) 164 - 446 K/uL    MPV 14.5 (H) 9.0 - 12.9 fL   Comp Metabolic Panel    Collection Time: 09/03/19  1:57 AM   Result Value Ref Range    Sodium 141 135 - 145 mmol/L    Potassium 3.3 (L) 3.6 - 5.5 mmol/L    Chloride 104 96 - 112 mmol/L    Co2 23 20 - 33 mmol/L    Anion Gap 14.0 (H) 0.0 - 11.9    Glucose 112 (H) 65 - 99 mg/dL    Bun 5 (L) 8 - 22 mg/dL    Creatinine 0.81 0.50 - 1.40 mg/dL    Calcium 8.5 8.5 - 10.5 mg/dL    AST(SGOT) 21 12 - 45 U/L    ALT(SGPT) 42 2 - 50 U/L    Alkaline Phosphatase 83 30 - 99 U/L    Total Bilirubin 0.9 0.1 - 1.5 mg/dL    Albumin 3.2 3.2 - 4.9 g/dL    Total Protein 5.7 (L) 6.0 - 8.2 g/dL    Globulin 2.5 1.9 - 3.5 g/dL    A-G Ratio 1.3 g/dL   MAGNESIUM    Collection Time: 09/03/19  1:57 AM   Result Value Ref Range    Magnesium 2.0 1.5 - 2.5 mg/dL   ESTIMATED GFR    Collection Time: 09/03/19  1:57 AM   Result Value Ref Range    GFR If African American >60 >60 mL/min/1.73 m 2    GFR If Non African American >60 >60 mL/min/1.73 m 2     Medical Decision Making, by Problem:  Active Hospital Problems    Diagnosis   • Hypokalemia [E87.6]     Plan:  No surgical issues at this point.  Recommend a Bariatric soft diet. Advised the patient to proceed cautiously and  focus on protein and hydration both now and at home.  Hold PPI at home. Consider switching to H2 blocker.   Call our office right away if heartburn, reflux, abdominal pain, nausea or vomiting ensue.   Hold multivitamins/supplements until after office visit.  Discharge home when cleared by hospitalist team. F/u with our office next week as outpatient.    Quality Measures:  Quality-Core Measures    Discussed patient condition with Family, Patient and Dr. Ganser

## 2019-09-03 NOTE — PROGRESS NOTES
Assumed care of patient at change of shift.  During change of shift report, patient (pt) sleeping in bed, on room air.  Per nightshift pt vomited up 0700 meds. During assessment, pt AOx4, no pain, but some nasua. Pt medicated and now reporting less nausea and able to tolerate clear liquids.

## 2019-09-05 LAB
BACTERIA BLD CULT: NORMAL
BACTERIA BLD CULT: NORMAL
SIGNIFICANT IND 70042: NORMAL
SIGNIFICANT IND 70042: NORMAL
SITE SITE: NORMAL
SITE SITE: NORMAL
SOURCE SOURCE: NORMAL
SOURCE SOURCE: NORMAL

## 2019-09-10 ENCOUNTER — HOSPITAL ENCOUNTER (INPATIENT)
Facility: MEDICAL CENTER | Age: 24
LOS: 2 days | DRG: 418 | End: 2019-09-12
Attending: EMERGENCY MEDICINE | Admitting: HOSPITALIST
Payer: COMMERCIAL

## 2019-09-10 ENCOUNTER — APPOINTMENT (OUTPATIENT)
Dept: RADIOLOGY | Facility: MEDICAL CENTER | Age: 24
DRG: 418 | End: 2019-09-10
Attending: INTERNAL MEDICINE
Payer: COMMERCIAL

## 2019-09-10 DIAGNOSIS — K85.90 ACUTE PANCREATITIS, UNSPECIFIED COMPLICATION STATUS, UNSPECIFIED PANCREATITIS TYPE: ICD-10-CM

## 2019-09-10 PROBLEM — K21.9 GERD (GASTROESOPHAGEAL REFLUX DISEASE): Status: ACTIVE | Noted: 2019-09-10

## 2019-09-10 PROBLEM — E66.01 CLASS 3 SEVERE OBESITY DUE TO EXCESS CALORIES WITHOUT SERIOUS COMORBIDITY WITH BODY MASS INDEX (BMI) OF 45.0 TO 49.9 IN ADULT (HCC): Status: ACTIVE | Noted: 2019-09-10

## 2019-09-10 PROBLEM — Z98.84 HISTORY OF WEIGHT LOSS SURGERY: Status: ACTIVE | Noted: 2019-09-10

## 2019-09-10 LAB
ALBUMIN SERPL BCP-MCNC: 3.9 G/DL (ref 3.2–4.9)
ALBUMIN/GLOB SERPL: 1.1 G/DL
ALP SERPL-CCNC: 101 U/L (ref 30–99)
ALT SERPL-CCNC: 93 U/L (ref 2–50)
AMYLASE SERPL-CCNC: 123 U/L (ref 20–103)
ANION GAP SERPL CALC-SCNC: 19 MMOL/L (ref 0–11.9)
AST SERPL-CCNC: 32 U/L (ref 12–45)
BASOPHILS # BLD AUTO: 0.4 % (ref 0–1.8)
BASOPHILS # BLD: 0.05 K/UL (ref 0–0.12)
BILIRUB SERPL-MCNC: 1 MG/DL (ref 0.1–1.5)
BUN SERPL-MCNC: 16 MG/DL (ref 8–22)
CALCIUM SERPL-MCNC: 9.3 MG/DL (ref 8.5–10.5)
CHLORIDE SERPL-SCNC: 100 MMOL/L (ref 96–112)
CO2 SERPL-SCNC: 21 MMOL/L (ref 20–33)
CREAT SERPL-MCNC: 0.68 MG/DL (ref 0.5–1.4)
EOSINOPHIL # BLD AUTO: 0.03 K/UL (ref 0–0.51)
EOSINOPHIL NFR BLD: 0.2 % (ref 0–6.9)
ERYTHROCYTE [DISTWIDTH] IN BLOOD BY AUTOMATED COUNT: 44.6 FL (ref 35.9–50)
GLOBULIN SER CALC-MCNC: 3.6 G/DL (ref 1.9–3.5)
GLUCOSE SERPL-MCNC: 102 MG/DL (ref 65–99)
HCG SERPL QL: NEGATIVE
HCT VFR BLD AUTO: 50 % (ref 37–47)
HGB BLD-MCNC: 16.4 G/DL (ref 12–16)
IMM GRANULOCYTES # BLD AUTO: 0.05 K/UL (ref 0–0.11)
IMM GRANULOCYTES NFR BLD AUTO: 0.4 % (ref 0–0.9)
LIPASE SERPL-CCNC: 487 U/L (ref 11–82)
LYMPHOCYTES # BLD AUTO: 1.47 K/UL (ref 1–4.8)
LYMPHOCYTES NFR BLD: 10.6 % (ref 22–41)
MCH RBC QN AUTO: 27.7 PG (ref 27–33)
MCHC RBC AUTO-ENTMCNC: 32.8 G/DL (ref 33.6–35)
MCV RBC AUTO: 84.3 FL (ref 81.4–97.8)
MONOCYTES # BLD AUTO: 0.85 K/UL (ref 0–0.85)
MONOCYTES NFR BLD AUTO: 6.1 % (ref 0–13.4)
NEUTROPHILS # BLD AUTO: 11.47 K/UL (ref 2–7.15)
NEUTROPHILS NFR BLD: 82.3 % (ref 44–72)
NRBC # BLD AUTO: 0 K/UL
NRBC BLD-RTO: 0 /100 WBC
PLATELET # BLD AUTO: 129 K/UL (ref 164–446)
PMV BLD AUTO: 13.9 FL (ref 9–12.9)
POTASSIUM SERPL-SCNC: 3.8 MMOL/L (ref 3.6–5.5)
PROT SERPL-MCNC: 7.5 G/DL (ref 6–8.2)
RBC # BLD AUTO: 5.93 M/UL (ref 4.2–5.4)
SODIUM SERPL-SCNC: 140 MMOL/L (ref 135–145)
WBC # BLD AUTO: 13.9 K/UL (ref 4.8–10.8)

## 2019-09-10 PROCEDURE — 99223 1ST HOSP IP/OBS HIGH 75: CPT | Performed by: HOSPITALIST

## 2019-09-10 PROCEDURE — 700111 HCHG RX REV CODE 636 W/ 250 OVERRIDE (IP): Performed by: EMERGENCY MEDICINE

## 2019-09-10 PROCEDURE — 80053 COMPREHEN METABOLIC PANEL: CPT

## 2019-09-10 PROCEDURE — 700102 HCHG RX REV CODE 250 W/ 637 OVERRIDE(OP): Performed by: HOSPITALIST

## 2019-09-10 PROCEDURE — 700105 HCHG RX REV CODE 258: Performed by: EMERGENCY MEDICINE

## 2019-09-10 PROCEDURE — 700105 HCHG RX REV CODE 258: Performed by: HOSPITALIST

## 2019-09-10 PROCEDURE — 96361 HYDRATE IV INFUSION ADD-ON: CPT

## 2019-09-10 PROCEDURE — 99285 EMERGENCY DEPT VISIT HI MDM: CPT

## 2019-09-10 PROCEDURE — 76705 ECHO EXAM OF ABDOMEN: CPT

## 2019-09-10 PROCEDURE — 82150 ASSAY OF AMYLASE: CPT

## 2019-09-10 PROCEDURE — 84703 CHORIONIC GONADOTROPIN ASSAY: CPT

## 2019-09-10 PROCEDURE — 36415 COLL VENOUS BLD VENIPUNCTURE: CPT

## 2019-09-10 PROCEDURE — 700111 HCHG RX REV CODE 636 W/ 250 OVERRIDE (IP): Performed by: HOSPITALIST

## 2019-09-10 PROCEDURE — 770001 HCHG ROOM/CARE - MED/SURG/GYN PRIV*

## 2019-09-10 PROCEDURE — 96374 THER/PROPH/DIAG INJ IV PUSH: CPT

## 2019-09-10 PROCEDURE — 85025 COMPLETE CBC W/AUTO DIFF WBC: CPT

## 2019-09-10 PROCEDURE — A9270 NON-COVERED ITEM OR SERVICE: HCPCS | Performed by: HOSPITALIST

## 2019-09-10 PROCEDURE — 83690 ASSAY OF LIPASE: CPT

## 2019-09-10 RX ORDER — PROMETHAZINE HYDROCHLORIDE 25 MG/1
12.5-25 TABLET ORAL EVERY 4 HOURS PRN
Status: DISCONTINUED | OUTPATIENT
Start: 2019-09-10 | End: 2019-09-12 | Stop reason: HOSPADM

## 2019-09-10 RX ORDER — BISACODYL 10 MG
10 SUPPOSITORY, RECTAL RECTAL
Status: DISCONTINUED | OUTPATIENT
Start: 2019-09-10 | End: 2019-09-12 | Stop reason: HOSPADM

## 2019-09-10 RX ORDER — PROCHLORPERAZINE EDISYLATE 5 MG/ML
10 INJECTION INTRAMUSCULAR; INTRAVENOUS ONCE
Status: COMPLETED | OUTPATIENT
Start: 2019-09-10 | End: 2019-09-10

## 2019-09-10 RX ORDER — SODIUM CHLORIDE, SODIUM LACTATE, POTASSIUM CHLORIDE, CALCIUM CHLORIDE 600; 310; 30; 20 MG/100ML; MG/100ML; MG/100ML; MG/100ML
INJECTION, SOLUTION INTRAVENOUS CONTINUOUS
Status: DISCONTINUED | OUTPATIENT
Start: 2019-09-10 | End: 2019-09-12

## 2019-09-10 RX ORDER — PROMETHAZINE HYDROCHLORIDE 12.5 MG/1
12.5-25 SUPPOSITORY RECTAL EVERY 4 HOURS PRN
Status: DISCONTINUED | OUTPATIENT
Start: 2019-09-10 | End: 2019-09-12 | Stop reason: HOSPADM

## 2019-09-10 RX ORDER — POLYETHYLENE GLYCOL 3350 17 G/17G
1 POWDER, FOR SOLUTION ORAL
Status: DISCONTINUED | OUTPATIENT
Start: 2019-09-10 | End: 2019-09-12 | Stop reason: HOSPADM

## 2019-09-10 RX ORDER — MORPHINE SULFATE 4 MG/ML
2 INJECTION, SOLUTION INTRAMUSCULAR; INTRAVENOUS
Status: DISCONTINUED | OUTPATIENT
Start: 2019-09-10 | End: 2019-09-11

## 2019-09-10 RX ORDER — AMOXICILLIN 250 MG
2 CAPSULE ORAL 2 TIMES DAILY
Status: DISCONTINUED | OUTPATIENT
Start: 2019-09-10 | End: 2019-09-12 | Stop reason: HOSPADM

## 2019-09-10 RX ORDER — SODIUM CHLORIDE 9 MG/ML
1000 INJECTION, SOLUTION INTRAVENOUS ONCE
Status: COMPLETED | OUTPATIENT
Start: 2019-09-10 | End: 2019-09-10

## 2019-09-10 RX ORDER — ONDANSETRON 2 MG/ML
4 INJECTION INTRAMUSCULAR; INTRAVENOUS EVERY 4 HOURS PRN
Status: DISCONTINUED | OUTPATIENT
Start: 2019-09-10 | End: 2019-09-12 | Stop reason: HOSPADM

## 2019-09-10 RX ORDER — ONDANSETRON 4 MG/1
4 TABLET, ORALLY DISINTEGRATING ORAL EVERY 4 HOURS PRN
Status: DISCONTINUED | OUTPATIENT
Start: 2019-09-10 | End: 2019-09-12 | Stop reason: HOSPADM

## 2019-09-10 RX ORDER — OMEPRAZOLE 20 MG/1
20 CAPSULE, DELAYED RELEASE ORAL DAILY
Status: DISCONTINUED | OUTPATIENT
Start: 2019-09-10 | End: 2019-09-12 | Stop reason: HOSPADM

## 2019-09-10 RX ORDER — CLONIDINE HYDROCHLORIDE 0.1 MG/1
0.1 TABLET ORAL EVERY 6 HOURS PRN
Status: DISCONTINUED | OUTPATIENT
Start: 2019-09-10 | End: 2019-09-12 | Stop reason: HOSPADM

## 2019-09-10 RX ORDER — OXYCODONE HYDROCHLORIDE 5 MG/1
5 TABLET ORAL
Status: DISCONTINUED | OUTPATIENT
Start: 2019-09-10 | End: 2019-09-11

## 2019-09-10 RX ORDER — PROCHLORPERAZINE EDISYLATE 5 MG/ML
5-10 INJECTION INTRAMUSCULAR; INTRAVENOUS EVERY 4 HOURS PRN
Status: DISCONTINUED | OUTPATIENT
Start: 2019-09-10 | End: 2019-09-12 | Stop reason: HOSPADM

## 2019-09-10 RX ORDER — OXYCODONE HYDROCHLORIDE 5 MG/1
2.5 TABLET ORAL
Status: DISCONTINUED | OUTPATIENT
Start: 2019-09-10 | End: 2019-09-11

## 2019-09-10 RX ORDER — ACETAMINOPHEN 325 MG/1
650 TABLET ORAL EVERY 6 HOURS PRN
Status: DISCONTINUED | OUTPATIENT
Start: 2019-09-10 | End: 2019-09-12 | Stop reason: HOSPADM

## 2019-09-10 RX ADMIN — PROCHLORPERAZINE EDISYLATE 10 MG: 5 INJECTION INTRAMUSCULAR; INTRAVENOUS at 14:08

## 2019-09-10 RX ADMIN — PROCHLORPERAZINE EDISYLATE 10 MG: 5 INJECTION INTRAMUSCULAR; INTRAVENOUS at 19:58

## 2019-09-10 RX ADMIN — ONDANSETRON 4 MG: 2 INJECTION INTRAMUSCULAR; INTRAVENOUS at 05:44

## 2019-09-10 RX ADMIN — ONDANSETRON 4 MG: 2 INJECTION INTRAMUSCULAR; INTRAVENOUS at 11:28

## 2019-09-10 RX ADMIN — SODIUM CHLORIDE, POTASSIUM CHLORIDE, SODIUM LACTATE AND CALCIUM CHLORIDE: 600; 310; 30; 20 INJECTION, SOLUTION INTRAVENOUS at 14:11

## 2019-09-10 RX ADMIN — OMEPRAZOLE 20 MG: 20 CAPSULE, DELAYED RELEASE ORAL at 05:51

## 2019-09-10 RX ADMIN — SODIUM CHLORIDE, POTASSIUM CHLORIDE, SODIUM LACTATE AND CALCIUM CHLORIDE: 600; 310; 30; 20 INJECTION, SOLUTION INTRAVENOUS at 04:09

## 2019-09-10 RX ADMIN — SODIUM CHLORIDE, POTASSIUM CHLORIDE, SODIUM LACTATE AND CALCIUM CHLORIDE: 600; 310; 30; 20 INJECTION, SOLUTION INTRAVENOUS at 05:48

## 2019-09-10 RX ADMIN — SODIUM CHLORIDE 1000 ML: 9 INJECTION, SOLUTION INTRAVENOUS at 02:43

## 2019-09-10 RX ADMIN — ENOXAPARIN SODIUM 40 MG: 100 INJECTION SUBCUTANEOUS at 05:51

## 2019-09-10 RX ADMIN — PROCHLORPERAZINE EDISYLATE 10 MG: 5 INJECTION INTRAMUSCULAR; INTRAVENOUS at 02:44

## 2019-09-10 ASSESSMENT — COGNITIVE AND FUNCTIONAL STATUS - GENERAL
MOBILITY SCORE: 24
SUGGESTED CMS G CODE MODIFIER MOBILITY: CH
STANDING UP FROM CHAIR USING ARMS: A LITTLE
DAILY ACTIVITIY SCORE: 24
DAILY ACTIVITIY SCORE: 24
SUGGESTED CMS G CODE MODIFIER DAILY ACTIVITY: CH
SUGGESTED CMS G CODE MODIFIER DAILY ACTIVITY: CH

## 2019-09-10 ASSESSMENT — ENCOUNTER SYMPTOMS
CHILLS: 0
EYES NEGATIVE: 1
SHORTNESS OF BREATH: 0
NEUROLOGICAL NEGATIVE: 1
ABDOMINAL PAIN: 0
RESPIRATORY NEGATIVE: 1
MUSCULOSKELETAL NEGATIVE: 1
CONSTITUTIONAL NEGATIVE: 1
FEVER: 0
CARDIOVASCULAR NEGATIVE: 1
PSYCHIATRIC NEGATIVE: 1
VOMITING: 1
NAUSEA: 1
DIARRHEA: 1

## 2019-09-10 ASSESSMENT — PATIENT HEALTH QUESTIONNAIRE - PHQ9
2. FEELING DOWN, DEPRESSED, IRRITABLE, OR HOPELESS: NOT AT ALL
SUM OF ALL RESPONSES TO PHQ9 QUESTIONS 1 AND 2: 0
1. LITTLE INTEREST OR PLEASURE IN DOING THINGS: NOT AT ALL

## 2019-09-10 ASSESSMENT — COPD QUESTIONNAIRES
DO YOU EVER COUGH UP ANY MUCUS OR PHLEGM?: NO/ONLY WITH OCCASIONAL COLDS OR INFECTIONS
COPD SCREENING SCORE: 0
IN THE PAST 12 MONTHS DO YOU DO LESS THAN YOU USED TO BECAUSE OF YOUR BREATHING PROBLEMS: DISAGREE/UNSURE
DURING THE PAST 4 WEEKS HOW MUCH DID YOU FEEL SHORT OF BREATH: NONE/LITTLE OF THE TIME
HAVE YOU SMOKED AT LEAST 100 CIGARETTES IN YOUR ENTIRE LIFE: NO/DON'T KNOW

## 2019-09-10 ASSESSMENT — LIFESTYLE VARIABLES
CONSUMPTION TOTAL: NEGATIVE
EVER_SMOKED: NEVER
EVER HAD A DRINK FIRST THING IN THE MORNING TO STEADY YOUR NERVES TO GET RID OF A HANGOVER: NO
AVERAGE NUMBER OF DAYS PER WEEK YOU HAVE A DRINK CONTAINING ALCOHOL: 0
ALCOHOL_USE: NO
TOTAL SCORE: 0
ON A TYPICAL DAY WHEN YOU DRINK ALCOHOL HOW MANY DRINKS DO YOU HAVE: 0
EVER_SMOKED: NEVER
TOTAL SCORE: 0
EVER FELT BAD OR GUILTY ABOUT YOUR DRINKING: NO
HOW MANY TIMES IN THE PAST YEAR HAVE YOU HAD 5 OR MORE DRINKS IN A DAY: 0
TOTAL SCORE: 0
HAVE PEOPLE ANNOYED YOU BY CRITICIZING YOUR DRINKING: NO
HAVE YOU EVER FELT YOU SHOULD CUT DOWN ON YOUR DRINKING: NO

## 2019-09-10 NOTE — LETTER
September 12, 2019     To Whom This May Concern:    Kenzie Gray was admitted to our institution from 09/10/19 to 09/12/19 for an acute illness.  Her immediate family members were caring for her during this hospitalization.  If possible, please allow them excused from their official duties during this period.          Thank you,    Carolina Garcias MD

## 2019-09-10 NOTE — ED NOTES
Pt sleeping in bed with family at bedside. Pt's breaths are even and unlabored, no distress is noted. Will continue to monitor.

## 2019-09-10 NOTE — ED NOTES
Report called to GANESH Bautista. Pt is being transported upstairs by 2 ER techs via gurney at this time. Pt is awake, talking to staff, in no apparent distress at time of transfer. Pt's mom and sister going upstairs with pt as well. Pt's paperwork and belongings sent upstairs with pt, family, and staff.

## 2019-09-10 NOTE — DIETARY
"Nutrition services: Day 0 of admit.  Kenzie Gray is a 23 y.o. female with admitting DX of pancreatitis.   Consult received for high BMI > 40 and recent poor appetite per nutrition admit screen (MST score = 1).     Visited pt at bedside. Pt states that her diet has consisted mainly of liquids ever since her bariatric surgery in beginning of August. When asked what kind of liquids pt reports mostly juices, she states she was taking broth but then this began to cause nausea as well ~2 weeks ago. Pt states that she has followed up with surgeon x 2 since procedure, she states MD has been encouraging protein drinks, however pt states that she has not been able to tolerate these either. Pt stated that she had tried some solid foods (chicken and broccoli) however these were not tolerated.     Assessment:  Height: 180.3 cm (5' 11\")  Weight: (!) 152.5 kg (336 lb 3.2 oz)  Body mass index is 46.89 kg/m²., BMI classification: obesity, morbid  Diet/Intake: clear liquids    Recent weight trend:  8/5 (day of gastric sleeve procedure) = 168.5 kg (372#)  8/31 (first admit with pancreatitis) = 154 kg (340#)  Current weight = 152 kg (335#)  16.5 kg/37# (10%) weight loss in 5 weeks.     Evaluation:   1. Regarding high BMI - pt is s/p bariatric procedure and has been having dramatic weight loss over the past month   2. Pt has been unable to tolerate any substantial sources of nutrition since surgery on 8/5  3. Other past medical hx includes: depression/anxiety, PCOS, migraines  4. MAR: prilosec, LR infusion @ 125 ml/hr  5. Lipase = 487 (up from 258 on 9/2), amylase = 123  6. GI: last BM 9/4 per flowsheet documentation    Malnutrition Risk: Pt does fit criteria of severe acute illness malnutrition r/t recurrent pancreatitis s/p gastric sleeve, evidenced by severe weight loss of 10% in 5 weeks and PO intake of < 50% of estimated energy needs for past 5 weeks.   Sudden weight loss and reduced PO intake s/p gastric sleeve " expected, however pt has been experiencing this to an extreme.     Recommendations/Plan:  1. Advance diet beyond NPO/clear liquids as medically appropriate  2. If pt is unable to tolerate anything more than juice and broth, recommend strong consideration for nutrition support either via small bowel feeding tube or TPN d/t prolonged inability to tolerate anything with substantial protein content    3. Monitor weight.  4. Nutrition rep will continue to see patient for ongoing meal and snack preferences.  5. RD to monitor for diet advancement, PO intake, wt trends, and nutrition labs/meds    RD to follow

## 2019-09-10 NOTE — ED PROVIDER NOTES
ER Provider Note     Scribed for Jermaine Lara M.D. by Arnaud Sandhu. 9/10/2019, 1:37 AM.    Primary Care Provider: Justina Lagos D.N.P.  Means of Arrival: Walk in  History obtained from: Patient  History limited by: None     CHIEF COMPLAINT  Chief Complaint   Patient presents with   • N/V     x 1 week       HPI  Kenzie Gray is a 23 y.o. female who presents to the Emergency Department complaining of nausea for the past 4 weeks. Patient had gastric sleeve surgery 5 weeks ago and ever since has been having the nausea. She has only vomited a few times but reports a lot of dry heaving. She has been unable to keep water down. She only endorses abdominal pain when she is vomiting. History of hiatal hernia repair. She has tried zofran in the past which has not resolved her nausea. There is a family history of gall stones however she has no abdominal pain. She has a history of pancreatitis.      REVIEW OF SYSTEMS  See HPI for further details. All other systems are negative.     PAST MEDICAL HISTORY   has a past medical history of Allergy, Bronchitis (2017), Depression, Gynecological disorder, Headache(784.0), Heart burn, Menses, irregular, Migraine, Migraines, Snoring, and Urinary incontinence. Pancreatitis    SURGICAL HISTORY   has a past surgical history that includes lap, kaylee restrict proc, longitudinal gas* (8/5/2019).    SOCIAL HISTORY  Social History     Tobacco Use   • Smoking status: Never Smoker   • Smokeless tobacco: Never Used   Substance Use Topics   • Alcohol use: Not Currently     Comment: every few months   • Drug use: No     Types: Inhaled      Social History     Substance and Sexual Activity   Drug Use No   • Types: Inhaled       FAMILY HISTORY  Family History   Problem Relation Age of Onset   • Alcohol/Drug Mother    • Arthritis Maternal Aunt    • Alcohol/Drug Maternal Aunt    • Alcohol/Drug Paternal Aunt    • Lung Disease Paternal Grandmother    • Diabetes Paternal Grandmother   "  • Stroke Paternal Grandmother    • Cancer Paternal Grandfather        CURRENT MEDICATIONS  Home Medications     Reviewed by Gerda Thurman R.N. (Registered Nurse) on 09/10/19 at 0117  Med List Status: <None>   Medication Last Dose Status   omeprazole (PRILOSEC) 20 MG delayed-release capsule  Active   ondansetron (ZOFRAN ODT) 4 MG TABLET DISPERSIBLE  Active                ALLERGIES  No Known Allergies    PHYSICAL EXAM  VITAL SIGNS: /84   Pulse 89   Temp 36.4 °C (97.5 °F) (Temporal)   Resp 16   Ht 1.803 m (5' 11\")   Wt (!) 152 kg (335 lb 1.6 oz)   SpO2 94%   BMI 46.74 kg/m²       Constitutional: Alert in no apparent distress.  HENT: No signs of trauma, Bilateral external ears normal, Nose normal. Dry mucous membranes  Eyes: Pupils are equal and reactive, Conjunctiva normal, Non-icteric.   Neck: Normal range of motion, No tenderness, Supple, No stridor.   Lymphatic: No lymphadenopathy noted.   Cardiovascular: Regular rate and rhythm, no murmurs.   Thorax & Lungs: Normal breath sounds, No respiratory distress, No wheezing, No chest tenderness.   Abdomen: Obese, Soft, No tenderness, No masses, No pulsatile masses. No peritoneal signs.  Skin: Warm, Dry, No erythema, No rash.   Back: No bony tenderness, No CVA tenderness.   Extremities: Intact distal pulses, No edema, No tenderness, No cyanosis.  Musculoskeletal: Good range of motion in all major joints. No tenderness to palpation or major deformities noted.   Neurologic: Alert , Normal motor function, Normal sensory function, No focal deficits noted.   Psychiatric: Affect normal, Judgment normal, Mood normal.     DIAGNOSTIC STUDIES / PROCEDURES    LABS  Labs Reviewed   CBC WITH DIFFERENTIAL - Abnormal; Notable for the following components:       Result Value    WBC 13.9 (*)     RBC 5.93 (*)     Hemoglobin 16.4 (*)     Hematocrit 50.0 (*)     MCHC 32.8 (*)     Platelet Count 129 (*)     MPV 13.9 (*)     Neutrophils-Polys 82.30 (*)     Lymphocytes " 10.60 (*)     Neutrophils (Absolute) 11.47 (*)     All other components within normal limits   COMP METABOLIC PANEL - Abnormal; Notable for the following components:    Anion Gap 19.0 (*)     Glucose 102 (*)     ALT(SGPT) 93 (*)     Alkaline Phosphatase 101 (*)     Globulin 3.6 (*)     All other components within normal limits   LIPASE - Abnormal; Notable for the following components:    Lipase 487 (*)     All other components within normal limits   HCG QUAL SERUM   ESTIMATED GFR     All labs reviewed by me.      COURSE & MEDICAL DECISION MAKING  Pertinent Labs & Imaging studies reviewed. (See chart for details)    This is a 23 y.o. female that presents complaining of nausea and vomiting for the past 4 weeks.  I am concerned this could represent pancreatitis.  There is no evidence of obstruction.  There is no urinary symptoms.  This could present pregnancy versus liver dysfunction or biliary dysfunction.  I will perform the below work-up and then reassess..     1:37 AM - Patient seen and examined at bedside. Presents with nausea vomiting for the past 4 weeks following gastric sleeve surgery. She is afebrile and has a normal heart rate, other vitals also within normal limits. She has no abdominal tenderness to palpation, but does show signs of dehydration with her dry mucous membranes. She will obtain IV fluids for her inability to tolerate oral fluids and those signs of dehydration. Ordered estimated GFR, CBC, CMP, lipase, HCG qual.  Patient will be medicated with prochlorperazine for her symptoms.      2:20 AM  Reviewed lab work, concerning for acute pancreatitis. Paged hospitalist.Patient found to have pancreatitis.  I will admit for this.  She does have an anion gap of 19.  Her leukocytosis is 13.9.  She had no stones or gallbladder no prior CT.  Do not believe this is gallstone pancreatitis.  Admitted to the hospitalist in guarded condition.     2:33 AM   Spoke with hospitalist, Dr. Page, regarding the patient.  He agrees to admit and patient care was transferred at this time.    HYDRATION: Based on the patient's presentation of Dehydration and Inability to take oral fluids the patient was given IV fluids. IV Hydration was used because oral hydration was not adequate alone. Upon recheck following hydration, the patient was improved.      DISPOSITION:  Patient will be admitted to Dr. Page in guarded condition.      FINAL IMPRESSION  1. Acute pancreatitis, unspecified complication status, unspecified pancreatitis type          I, Arnaud Sandhu (Scribe), am scribing for, and in the presence of, Jermaine Lara M.D..    Electronically signed by: Arnaud Sandhu (Scribe), 9/10/2019    I, Jermaine Lara M.D. personally performed the services described in this documentation, as scribed by Arnaud Sandhu in my presence, and it is both accurate and complete.  C.    The note accurately reflects work and decisions made by me.  Jermaine Lara  9/10/2019  5:55 AM

## 2019-09-10 NOTE — CARE PLAN
Problem: Nutritional:  Goal: Achieve adequate nutritional intake  Description  Diet will advance beyond NPO/clear liquids and patient will consume ~50% of meals   Outcome: NOT MET

## 2019-09-10 NOTE — ED NOTES
Pt resting in bed with eyes closed, in no apparent distress, with even and unlabored breaths. Pt's family is at bedside. Pt medicated as ordered and RN will continue to monitor.

## 2019-09-10 NOTE — CARE PLAN
Problem: Safety  Goal: Will remain free from injury  Outcome: PROGRESSING AS EXPECTED  Goal: Will remain free from falls  Outcome: PROGRESSING AS EXPECTED     Problem: Pain  Goal: Alleviation of Pain or a reduction in pain to the patient's comfort goal  Outcome: PROGRESSING AS EXPECTED

## 2019-09-10 NOTE — PROGRESS NOTES
2 RN skin check complete with GANESH Coombs.   Devices in place PIV.  Skin assessed under devices N/A.  Confirmed pressure ulcers found on N/A.  New potential pressure ulcers noted on N/A. Wound consult placed NO .    Skin assessment: Bilateral bruising to upper arms. Pink, irritation noted to abdominal folds.Pillows in place for support.

## 2019-09-10 NOTE — ED TRIAGE NOTES
"Chief Complaint   Patient presents with   • N/V     x 1 week     Pt reports HX of pancreatitis   N/V as above, denies ABD pain or fever.  Pt educated on triage process and placed back in lobby, pt encourage to alert staff with changes in condition.      /84   Pulse 89   Temp 36.4 °C (97.5 °F) (Temporal)   Resp 16   Ht 1.803 m (5' 11\")   Wt (!) 152 kg (335 lb 1.6 oz)   SpO2 94%   BMI 46.74 kg/m²     "

## 2019-09-10 NOTE — ASSESSMENT & PLAN NOTE
Unclear etiology, recurrent since gastric procedure.  Main symptom of nausea with no significant pain.  ?elevated lipase in the setting of gastric sleeve procedure versus true acute pancreatitis.

## 2019-09-10 NOTE — ED NOTES
Pt ambulated from lobby to [place] [id number] without assistance, tolerated well. Pt placed in hospital gown and is now resting in bed with even and unlabored breaths, in no apparent distress. Pt's family is at bedside. Will continue to monitor pt while awaiting further orders.

## 2019-09-10 NOTE — H&P
Hospital Medicine History & Physical Note    Date of Service  9/10/2019    Primary Care Physician  Justina Lagos D.N.P.    Consultants  None    Code Status  Full code    Chief Complaint  Persistent nausea    History of Presenting Illness  23 y.o. Female with history of gastric sleeve procedure, and gastroesophageal reflux disease on medication regimen, was in her usual state of health until approximately 4 weeks prior to admission when she began to develop nausea which was persistent and daily.  1 week prior to that she did have a gastric sleeve surgery, and noted feeling all right for the first few days, however began to develop nausea and dry heaving.  She reports not being able to take any of her medications due to the same.  She denies any significant abdominal pain or tenderness, no fever or chills, no constipation, no headache or vision changes.    Review of Systems  Review of Systems   Constitutional: Negative.    HENT: Negative.    Eyes: Negative.    Respiratory: Negative.  Negative for shortness of breath.    Cardiovascular: Negative.    Gastrointestinal: Positive for diarrhea, nausea and vomiting. Negative for abdominal pain.   Genitourinary: Negative.    Musculoskeletal: Negative.    Skin: Negative.    Neurological: Negative.    Endo/Heme/Allergies: Negative.    Psychiatric/Behavioral: Negative.        Past Medical History   has a past medical history of Allergy, Bronchitis (2017), Depression, Gynecological disorder, Headache(784.0), Heart burn, Menses, irregular, Migraine, Migraines, Snoring, and Urinary incontinence.    Surgical History   has a past surgical history that includes pr lap, kaylee restrict proc, longitudinal gas* (8/5/2019).     Family History  family history includes Alcohol/Drug in her maternal aunt, mother, and paternal aunt; Arthritis in her maternal aunt; Cancer in her paternal grandfather; Diabetes in her paternal grandmother; Lung Disease in her paternal grandmother; Stroke in  her paternal grandmother.     Social History   reports that she has never smoked. She has never used smokeless tobacco. She reports that she drank alcohol. She reports that she does not use drugs.    Allergies  No Known Allergies    Medications  Prior to Admission Medications   Prescriptions Last Dose Informant Patient Reported? Taking?   omeprazole (PRILOSEC) 20 MG delayed-release capsule  Patient Yes No   Sig: Take 20 mg by mouth every day.   ondansetron (ZOFRAN ODT) 4 MG TABLET DISPERSIBLE  Patient Yes No   Sig: Take 4 mg by mouth every 6 hours as needed for Nausea.      Facility-Administered Medications: None       Physical Exam  Temp:  [36.4 °C (97.5 °F)] 36.4 °C (97.5 °F)  Pulse:  [52-89] 52  Resp:  [16-20] 20  BP: ()/(44-84) 95/44  SpO2:  [94 %-97 %] 96 %    Physical Exam   Constitutional: She is oriented to person, place, and time. She appears well-developed and well-nourished. No distress.   HENT:   Head: Normocephalic and atraumatic.   Eyes: Pupils are equal, round, and reactive to light. Conjunctivae are normal.   Neck: Normal range of motion. Neck supple. No tracheal deviation present. No thyromegaly present.   Cardiovascular: Normal rate, regular rhythm and normal heart sounds. Exam reveals no gallop and no friction rub.   No murmur heard.  Pulmonary/Chest: Effort normal and breath sounds normal. No respiratory distress. She has no wheezes. She has no rales.   Abdominal: Soft. Bowel sounds are normal. She exhibits no distension. There is no tenderness. There is no rebound.   Musculoskeletal: Normal range of motion. She exhibits no edema.   Lymphadenopathy:     She has no cervical adenopathy.   Neurological: She is alert and oriented to person, place, and time. No cranial nerve deficit.   Skin: Skin is warm and dry. She is not diaphoretic.   Psychiatric: She has a normal mood and affect.   Nursing note and vitals reviewed.      Laboratory:  Recent Labs     09/10/19  0135   WBC 13.9*   RBC 5.93*    HEMOGLOBIN 16.4*   HEMATOCRIT 50.0*   MCV 84.3   MCH 27.7   MCHC 32.8*   RDW 44.6   PLATELETCT 129*   MPV 13.9*     Recent Labs     09/10/19  0135   SODIUM 140   POTASSIUM 3.8   CHLORIDE 100   CO2 21   GLUCOSE 102*   BUN 16   CREATININE 0.68   CALCIUM 9.3     Recent Labs     09/10/19  0135   ALTSGPT 93*   ASTSGOT 32   ALKPHOSPHAT 101*   TBILIRUBIN 1.0   LIPASE 487*   GLUCOSE 102*         No results for input(s): NTPROBNP in the last 72 hours.      No results for input(s): TROPONINT in the last 72 hours.    Urinalysis:    No results found     Imaging:  No orders to display         Assessment/Plan:  I anticipate this patient will require at least two midnights for appropriate medical management, necessitating inpatient admission.    * Acute pancreatitis  Assessment & Plan  Unclear etiology, recurrent since gastric procedure.  Main symptom of nausea with no significant pain.  ?elevated lipase in the setting of gastric sleeve procedure versus true acute pancreatitis.     GERD (gastroesophageal reflux disease)  Assessment & Plan  On medication regimen which will be continued.     History of weight loss surgery  Assessment & Plan  Gastric band procedure.  Unclear if pancreatitis could be related.      Class 3 severe obesity due to excess calories without serious comorbidity with body mass index (BMI) of 45.0 to 49.9 in adult (HCC)  Assessment & Plan  Body mass index is 46.74 kg/m².          VTE prophylaxis: SCD, lovenox

## 2019-09-11 ENCOUNTER — ANESTHESIA (OUTPATIENT)
Dept: SURGERY | Facility: MEDICAL CENTER | Age: 24
DRG: 418 | End: 2019-09-11
Payer: COMMERCIAL

## 2019-09-11 ENCOUNTER — ANESTHESIA EVENT (OUTPATIENT)
Dept: SURGERY | Facility: MEDICAL CENTER | Age: 24
DRG: 418 | End: 2019-09-11
Payer: COMMERCIAL

## 2019-09-11 LAB
ALBUMIN SERPL BCP-MCNC: 3.1 G/DL (ref 3.2–4.9)
ALP SERPL-CCNC: 79 U/L (ref 30–99)
ALT SERPL-CCNC: 66 U/L (ref 2–50)
AMYLASE SERPL-CCNC: 112 U/L (ref 20–103)
ANION GAP SERPL CALC-SCNC: 14 MMOL/L (ref 0–11.9)
AST SERPL-CCNC: 23 U/L (ref 12–45)
BASOPHILS # BLD AUTO: 0.5 % (ref 0–1.8)
BASOPHILS # BLD: 0.05 K/UL (ref 0–0.12)
BILIRUB CONJ SERPL-MCNC: 0.4 MG/DL (ref 0.1–0.5)
BILIRUB INDIRECT SERPL-MCNC: 0.5 MG/DL (ref 0–1)
BILIRUB SERPL-MCNC: 0.9 MG/DL (ref 0.1–1.5)
BUN SERPL-MCNC: 9 MG/DL (ref 8–22)
CALCIUM SERPL-MCNC: 8.5 MG/DL (ref 8.5–10.5)
CHLORIDE SERPL-SCNC: 103 MMOL/L (ref 96–112)
CO2 SERPL-SCNC: 23 MMOL/L (ref 20–33)
CREAT SERPL-MCNC: 0.54 MG/DL (ref 0.5–1.4)
EOSINOPHIL # BLD AUTO: 0.1 K/UL (ref 0–0.51)
EOSINOPHIL NFR BLD: 1.1 % (ref 0–6.9)
ERYTHROCYTE [DISTWIDTH] IN BLOOD BY AUTOMATED COUNT: 45 FL (ref 35.9–50)
GLUCOSE SERPL-MCNC: 92 MG/DL (ref 65–99)
HCT VFR BLD AUTO: 46.8 % (ref 37–47)
HGB BLD-MCNC: 14.9 G/DL (ref 12–16)
IMM GRANULOCYTES # BLD AUTO: 0.04 K/UL (ref 0–0.11)
IMM GRANULOCYTES NFR BLD AUTO: 0.4 % (ref 0–0.9)
LIPASE SERPL-CCNC: 425 U/L (ref 11–82)
LYMPHOCYTES # BLD AUTO: 3.03 K/UL (ref 1–4.8)
LYMPHOCYTES NFR BLD: 32.9 % (ref 22–41)
MCH RBC QN AUTO: 27 PG (ref 27–33)
MCHC RBC AUTO-ENTMCNC: 31.8 G/DL (ref 33.6–35)
MCV RBC AUTO: 84.9 FL (ref 81.4–97.8)
MONOCYTES # BLD AUTO: 0.82 K/UL (ref 0–0.85)
MONOCYTES NFR BLD AUTO: 8.9 % (ref 0–13.4)
NEUTROPHILS # BLD AUTO: 5.18 K/UL (ref 2–7.15)
NEUTROPHILS NFR BLD: 56.2 % (ref 44–72)
NRBC # BLD AUTO: 0 K/UL
NRBC BLD-RTO: 0 /100 WBC
PLATELET # BLD AUTO: 106 K/UL (ref 164–446)
PMV BLD AUTO: 14.2 FL (ref 9–12.9)
POTASSIUM SERPL-SCNC: 3.3 MMOL/L (ref 3.6–5.5)
PROT SERPL-MCNC: 5.9 G/DL (ref 6–8.2)
RBC # BLD AUTO: 5.51 M/UL (ref 4.2–5.4)
SODIUM SERPL-SCNC: 140 MMOL/L (ref 135–145)
WBC # BLD AUTO: 9.2 K/UL (ref 4.8–10.8)

## 2019-09-11 PROCEDURE — 160048 HCHG OR STATISTICAL LEVEL 1-5: Performed by: SURGERY

## 2019-09-11 PROCEDURE — 0FT44ZZ RESECTION OF GALLBLADDER, PERCUTANEOUS ENDOSCOPIC APPROACH: ICD-10-PCS | Performed by: SURGERY

## 2019-09-11 PROCEDURE — 700102 HCHG RX REV CODE 250 W/ 637 OVERRIDE(OP): Performed by: ANESTHESIOLOGY

## 2019-09-11 PROCEDURE — 700111 HCHG RX REV CODE 636 W/ 250 OVERRIDE (IP): Performed by: INTERNAL MEDICINE

## 2019-09-11 PROCEDURE — 501570 HCHG TROCAR, SEPARATOR: Performed by: SURGERY

## 2019-09-11 PROCEDURE — 770001 HCHG ROOM/CARE - MED/SURG/GYN PRIV*

## 2019-09-11 PROCEDURE — 160039 HCHG SURGERY MINUTES - EA ADDL 1 MIN LEVEL 3: Performed by: SURGERY

## 2019-09-11 PROCEDURE — 160002 HCHG RECOVERY MINUTES (STAT): Performed by: SURGERY

## 2019-09-11 PROCEDURE — 36415 COLL VENOUS BLD VENIPUNCTURE: CPT

## 2019-09-11 PROCEDURE — 99233 SBSQ HOSP IP/OBS HIGH 50: CPT | Performed by: INTERNAL MEDICINE

## 2019-09-11 PROCEDURE — 80076 HEPATIC FUNCTION PANEL: CPT

## 2019-09-11 PROCEDURE — 700105 HCHG RX REV CODE 258: Performed by: ANESTHESIOLOGY

## 2019-09-11 PROCEDURE — 700111 HCHG RX REV CODE 636 W/ 250 OVERRIDE (IP): Performed by: HOSPITALIST

## 2019-09-11 PROCEDURE — 80048 BASIC METABOLIC PNL TOTAL CA: CPT

## 2019-09-11 PROCEDURE — 160035 HCHG PACU - 1ST 60 MINS PHASE I: Performed by: SURGERY

## 2019-09-11 PROCEDURE — 501399 HCHG SPECIMAN BAG, ENDO CATC: Performed by: SURGERY

## 2019-09-11 PROCEDURE — 0F944ZZ DRAINAGE OF GALLBLADDER, PERCUTANEOUS ENDOSCOPIC APPROACH: ICD-10-PCS | Performed by: SURGERY

## 2019-09-11 PROCEDURE — 700105 HCHG RX REV CODE 258: Performed by: HOSPITALIST

## 2019-09-11 PROCEDURE — A9270 NON-COVERED ITEM OR SERVICE: HCPCS | Performed by: ANESTHESIOLOGY

## 2019-09-11 PROCEDURE — 700101 HCHG RX REV CODE 250: Performed by: ANESTHESIOLOGY

## 2019-09-11 PROCEDURE — 88304 TISSUE EXAM BY PATHOLOGIST: CPT

## 2019-09-11 PROCEDURE — 82150 ASSAY OF AMYLASE: CPT

## 2019-09-11 PROCEDURE — 83690 ASSAY OF LIPASE: CPT

## 2019-09-11 PROCEDURE — 501571 HCHG TROCAR, SEPARATOR 12X100: Performed by: SURGERY

## 2019-09-11 PROCEDURE — 700111 HCHG RX REV CODE 636 W/ 250 OVERRIDE (IP): Performed by: ANESTHESIOLOGY

## 2019-09-11 PROCEDURE — 85025 COMPLETE CBC W/AUTO DIFF WBC: CPT

## 2019-09-11 PROCEDURE — 502571 HCHG PACK, LAP CHOLE: Performed by: SURGERY

## 2019-09-11 PROCEDURE — 501583 HCHG TROCAR, THRD CAN&SEAL 5X100: Performed by: SURGERY

## 2019-09-11 PROCEDURE — 500868 HCHG NEEDLE, SURGI(VARES): Performed by: SURGERY

## 2019-09-11 PROCEDURE — 160028 HCHG SURGERY MINUTES - 1ST 30 MINS LEVEL 3: Performed by: SURGERY

## 2019-09-11 PROCEDURE — 700101 HCHG RX REV CODE 250: Performed by: SURGERY

## 2019-09-11 PROCEDURE — 160036 HCHG PACU - EA ADDL 30 MINS PHASE I: Performed by: SURGERY

## 2019-09-11 PROCEDURE — 160009 HCHG ANES TIME/MIN: Performed by: SURGERY

## 2019-09-11 PROCEDURE — 501838 HCHG SUTURE GENERAL: Performed by: SURGERY

## 2019-09-11 PROCEDURE — A6402 STERILE GAUZE <= 16 SQ IN: HCPCS | Performed by: SURGERY

## 2019-09-11 RX ORDER — CEFAZOLIN SODIUM 1 G/3ML
INJECTION, POWDER, FOR SOLUTION INTRAMUSCULAR; INTRAVENOUS PRN
Status: DISCONTINUED | OUTPATIENT
Start: 2019-09-11 | End: 2019-09-11 | Stop reason: SURG

## 2019-09-11 RX ORDER — HYDROMORPHONE HYDROCHLORIDE 1 MG/ML
0.1 INJECTION, SOLUTION INTRAMUSCULAR; INTRAVENOUS; SUBCUTANEOUS
Status: DISCONTINUED | OUTPATIENT
Start: 2019-09-11 | End: 2019-09-11 | Stop reason: HOSPADM

## 2019-09-11 RX ORDER — HYDROMORPHONE HYDROCHLORIDE 1 MG/ML
0.4 INJECTION, SOLUTION INTRAMUSCULAR; INTRAVENOUS; SUBCUTANEOUS
Status: DISCONTINUED | OUTPATIENT
Start: 2019-09-11 | End: 2019-09-11 | Stop reason: HOSPADM

## 2019-09-11 RX ORDER — ONDANSETRON 2 MG/ML
4 INJECTION INTRAMUSCULAR; INTRAVENOUS
Status: COMPLETED | OUTPATIENT
Start: 2019-09-11 | End: 2019-09-11

## 2019-09-11 RX ORDER — HALOPERIDOL 5 MG/ML
1 INJECTION INTRAMUSCULAR
Status: DISCONTINUED | OUTPATIENT
Start: 2019-09-11 | End: 2019-09-11 | Stop reason: HOSPADM

## 2019-09-11 RX ORDER — HYDROMORPHONE HYDROCHLORIDE 1 MG/ML
0.2 INJECTION, SOLUTION INTRAMUSCULAR; INTRAVENOUS; SUBCUTANEOUS
Status: DISCONTINUED | OUTPATIENT
Start: 2019-09-11 | End: 2019-09-11 | Stop reason: HOSPADM

## 2019-09-11 RX ORDER — DEXAMETHASONE SODIUM PHOSPHATE 4 MG/ML
INJECTION, SOLUTION INTRA-ARTICULAR; INTRALESIONAL; INTRAMUSCULAR; INTRAVENOUS; SOFT TISSUE PRN
Status: DISCONTINUED | OUTPATIENT
Start: 2019-09-11 | End: 2019-09-11 | Stop reason: SURG

## 2019-09-11 RX ORDER — MORPHINE SULFATE 10 MG/ML
2-5 INJECTION, SOLUTION INTRAMUSCULAR; INTRAVENOUS
Status: DISCONTINUED | OUTPATIENT
Start: 2019-09-11 | End: 2019-09-12 | Stop reason: HOSPADM

## 2019-09-11 RX ORDER — BUPIVACAINE HYDROCHLORIDE AND EPINEPHRINE 5; 5 MG/ML; UG/ML
INJECTION, SOLUTION EPIDURAL; INTRACAUDAL; PERINEURAL
Status: DISCONTINUED | OUTPATIENT
Start: 2019-09-11 | End: 2019-09-11 | Stop reason: HOSPADM

## 2019-09-11 RX ORDER — OXYCODONE HYDROCHLORIDE 5 MG/1
5 TABLET ORAL
Status: DISCONTINUED | OUTPATIENT
Start: 2019-09-11 | End: 2019-09-12 | Stop reason: HOSPADM

## 2019-09-11 RX ORDER — DIPHENHYDRAMINE HYDROCHLORIDE 50 MG/ML
12.5 INJECTION INTRAMUSCULAR; INTRAVENOUS
Status: DISCONTINUED | OUTPATIENT
Start: 2019-09-11 | End: 2019-09-11 | Stop reason: HOSPADM

## 2019-09-11 RX ORDER — SODIUM CHLORIDE, SODIUM LACTATE, POTASSIUM CHLORIDE, CALCIUM CHLORIDE 600; 310; 30; 20 MG/100ML; MG/100ML; MG/100ML; MG/100ML
INJECTION, SOLUTION INTRAVENOUS CONTINUOUS
Status: DISCONTINUED | OUTPATIENT
Start: 2019-09-11 | End: 2019-09-11 | Stop reason: HOSPADM

## 2019-09-11 RX ORDER — OXYCODONE HCL 5 MG/5 ML
10 SOLUTION, ORAL ORAL
Status: COMPLETED | OUTPATIENT
Start: 2019-09-11 | End: 2019-09-11

## 2019-09-11 RX ORDER — POTASSIUM CHLORIDE 7.45 MG/ML
10 INJECTION INTRAVENOUS
Status: DISPENSED | OUTPATIENT
Start: 2019-09-11 | End: 2019-09-11

## 2019-09-11 RX ORDER — ONDANSETRON 4 MG/1
4 TABLET, ORALLY DISINTEGRATING ORAL EVERY 4 HOURS PRN
Status: DISCONTINUED | OUTPATIENT
Start: 2019-09-11 | End: 2019-09-11

## 2019-09-11 RX ORDER — OXYCODONE HYDROCHLORIDE 5 MG/1
2.5 TABLET ORAL
Status: DISCONTINUED | OUTPATIENT
Start: 2019-09-11 | End: 2019-09-12 | Stop reason: HOSPADM

## 2019-09-11 RX ORDER — KETOROLAC TROMETHAMINE 30 MG/ML
INJECTION, SOLUTION INTRAMUSCULAR; INTRAVENOUS PRN
Status: DISCONTINUED | OUTPATIENT
Start: 2019-09-11 | End: 2019-09-11 | Stop reason: SURG

## 2019-09-11 RX ORDER — HYDRALAZINE HYDROCHLORIDE 20 MG/ML
5 INJECTION INTRAMUSCULAR; INTRAVENOUS
Status: DISCONTINUED | OUTPATIENT
Start: 2019-09-11 | End: 2019-09-11 | Stop reason: HOSPADM

## 2019-09-11 RX ORDER — OXYCODONE HCL 5 MG/5 ML
5 SOLUTION, ORAL ORAL
Status: COMPLETED | OUTPATIENT
Start: 2019-09-11 | End: 2019-09-11

## 2019-09-11 RX ADMIN — PROPOFOL 200 MG: 10 INJECTION, EMULSION INTRAVENOUS at 18:52

## 2019-09-11 RX ADMIN — PROCHLORPERAZINE EDISYLATE 10 MG: 5 INJECTION INTRAMUSCULAR; INTRAVENOUS at 21:25

## 2019-09-11 RX ADMIN — ONDANSETRON 4 MG: 2 INJECTION INTRAMUSCULAR; INTRAVENOUS at 19:47

## 2019-09-11 RX ADMIN — CEFAZOLIN 3 G: 330 INJECTION, POWDER, FOR SOLUTION INTRAMUSCULAR; INTRAVENOUS at 18:37

## 2019-09-11 RX ADMIN — OXYCODONE HYDROCHLORIDE 10 MG: 5 SOLUTION ORAL at 20:29

## 2019-09-11 RX ADMIN — POTASSIUM CHLORIDE 10 MEQ: 10 INJECTION, SOLUTION INTRAVENOUS at 10:55

## 2019-09-11 RX ADMIN — SODIUM CHLORIDE, POTASSIUM CHLORIDE, SODIUM LACTATE AND CALCIUM CHLORIDE: 600; 310; 30; 20 INJECTION, SOLUTION INTRAVENOUS at 18:37

## 2019-09-11 RX ADMIN — KETOROLAC TROMETHAMINE 30 MG: 30 INJECTION, SOLUTION INTRAMUSCULAR at 18:52

## 2019-09-11 RX ADMIN — POTASSIUM CHLORIDE 10 MEQ: 10 INJECTION, SOLUTION INTRAVENOUS at 07:33

## 2019-09-11 RX ADMIN — FENTANYL CITRATE 50 MCG: 50 INJECTION, SOLUTION INTRAMUSCULAR; INTRAVENOUS at 19:37

## 2019-09-11 RX ADMIN — HALOPERIDOL LACTATE 1 MG: 5 INJECTION, SOLUTION INTRAMUSCULAR at 20:16

## 2019-09-11 RX ADMIN — FENTANYL CITRATE 50 MCG: 50 INJECTION, SOLUTION INTRAMUSCULAR; INTRAVENOUS at 20:04

## 2019-09-11 RX ADMIN — DEXAMETHASONE SODIUM PHOSPHATE 8 MG: 4 INJECTION, SOLUTION INTRA-ARTICULAR; INTRALESIONAL; INTRAMUSCULAR; INTRAVENOUS; SOFT TISSUE at 18:52

## 2019-09-11 RX ADMIN — PROCHLORPERAZINE EDISYLATE 10 MG: 5 INJECTION INTRAMUSCULAR; INTRAVENOUS at 16:16

## 2019-09-11 RX ADMIN — ROCURONIUM BROMIDE 50 MG: 10 INJECTION, SOLUTION INTRAVENOUS at 18:52

## 2019-09-11 RX ADMIN — ONDANSETRON 8 MG: 2 INJECTION INTRAMUSCULAR; INTRAVENOUS at 18:52

## 2019-09-11 RX ADMIN — POTASSIUM CHLORIDE 10 MEQ: 10 INJECTION, SOLUTION INTRAVENOUS at 13:29

## 2019-09-11 RX ADMIN — SODIUM CHLORIDE, POTASSIUM CHLORIDE, SODIUM LACTATE AND CALCIUM CHLORIDE: 600; 310; 30; 20 INJECTION, SOLUTION INTRAVENOUS at 20:30

## 2019-09-11 RX ADMIN — FENTANYL CITRATE 100 MCG: 50 INJECTION, SOLUTION INTRAMUSCULAR; INTRAVENOUS at 18:52

## 2019-09-11 ASSESSMENT — ENCOUNTER SYMPTOMS
FOCAL WEAKNESS: 0
CHILLS: 0
PALPITATIONS: 0
FEVER: 0
CONSTIPATION: 0
DIARRHEA: 0
ABDOMINAL PAIN: 1
VOMITING: 0
NAUSEA: 0
SHORTNESS OF BREATH: 0

## 2019-09-11 NOTE — PROGRESS NOTES
1055 2nd bag of 10meq kcl added to new iv site l arm  C/o burning . Decreased rate to 75ml /hr due to burning site looks good

## 2019-09-11 NOTE — PROGRESS NOTES
HOSPITAL MEDICINE PROGRESS NOTE    Date of service  9/11/2019    Chief Complaint  N/V (x 1 week)      Hospital Course:   23 years old woman who is status post sleeve gastrectomy, re-presented with epigastric and right upper quadrant abdominal pain, found to have acute pancreatitis, most likely related to gallbladder sludge.        Interval History Updates:  Hospital Day #Hospital Day: 2   No event overnight.  Afebrile.    Seen and evaluated by the general surgery team, Dr. Juarez.    Consultants/Specialty  General Surgery    Review of Systems   Review of Systems   Constitutional: Negative for chills and fever.   Respiratory: Negative for shortness of breath.    Cardiovascular: Negative for chest pain, palpitations and leg swelling.   Gastrointestinal: Positive for abdominal pain. Negative for constipation, diarrhea, nausea and vomiting.        Unable to tolerate oral intake.   Skin: Negative for rash.   Neurological: Negative for focal weakness.   Endo/Heme/Allergies: Negative.    All other systems reviewed and are negative.       Medications:  • PEDS potassium chloride (KCL-PERIPHERAL) IV  10 mEq Q HOUR   • omeprazole  20 mg DAILY   • acetaminophen  650 mg Q6HRS PRN   • Pharmacy Consult Request  1 Each PHARMACY TO DOSE    And   • oxyCODONE immediate-release  2.5 mg Q3HRS PRN    And   • oxyCODONE immediate-release  5 mg Q3HRS PRN    And   • morphine injection  2 mg Q3HRS PRN   • cloNIDine  0.1 mg Q6HRS PRN   • ondansetron  4 mg Q4HRS PRN   • ondansetron  4 mg Q4HRS PRN   • promethazine  12.5-25 mg Q4HRS PRN   • promethazine  12.5-25 mg Q4HRS PRN   • prochlorperazine  5-10 mg Q4HRS PRN   • senna-docusate  2 Tab BID    And   • polyethylene glycol/lytes  1 Packet QDAY PRN    And   • magnesium hydroxide  30 mL QDAY PRN    And   • bisacodyl  10 mg QDAY PRN   • LR   Continuous   • enoxaparin  40 mg DAILY       Physical Exam   Vitals:    09/10/19 0740 09/10/19 1920 09/11/19 0340 09/11/19 0730   BP: 107/46 123/70 132/65  115/66   Pulse: 62 65 (!) 59 (!) 55   Resp: 16 17 17 16   Temp: 36.2 °C (97.2 °F) 36.6 °C (97.8 °F) 36.1 °C (97 °F) 36.2 °C (97.1 °F)   TempSrc: Temporal Temporal Temporal Temporal   SpO2: 93% 96% 92% 95%   Weight:       Height:           Physical Exam   Constitutional: She is oriented to person, place, and time and well-developed, well-nourished, and in no distress. No distress.   HENT:   Head: Normocephalic and atraumatic.   Eyes: Pupils are equal, round, and reactive to light. Conjunctivae are normal. No scleral icterus.   Neck: Normal range of motion. Neck supple.   Cardiovascular: Normal rate, regular rhythm and intact distal pulses. Exam reveals no gallop and no friction rub.   No murmur heard.  Pulmonary/Chest: Effort normal and breath sounds normal. No respiratory distress. She has no wheezes. She has no rales. She exhibits no tenderness.   Abdominal: Soft. Bowel sounds are normal. She exhibits no distension and no mass. There is tenderness. There is no rebound and no guarding.   Right upper quadrant and epigastric.   Musculoskeletal: Normal range of motion. She exhibits no edema or tenderness.   Neurological: She is alert and oriented to person, place, and time.   Skin: Skin is warm and dry. She is not diaphoretic.   Psychiatric: Mood and affect normal.   Vitals reviewed.       Recent Labs     09/10/19  0135 09/11/19  0148   WBC 13.9* 9.2   RBC 5.93* 5.51*   HEMOGLOBIN 16.4* 14.9   HEMATOCRIT 50.0* 46.8   MCV 84.3 84.9   MCH 27.7 27.0   MCHC 32.8* 31.8*   RDW 44.6 45.0   PLATELETCT 129* 106*   MPV 13.9* 14.2*      Laboratory   Recent Labs     09/10/19  0135 09/11/19  0148   WBC 13.9* 9.2   RBC 5.93* 5.51*   HEMOGLOBIN 16.4* 14.9   HEMATOCRIT 50.0* 46.8   PLATELETCT 129* 106*     Recent Labs     09/10/19  0135 09/11/19  0148   SODIUM 140 140   POTASSIUM 3.8 3.3*   CHLORIDE 100 103   CO2 21 23   GLUCOSE 102* 92   BUN 16 9   CREATININE 0.68 0.54      Recent Labs     09/10/19  0135 09/10/19  1059 09/11/19  0148    ALTSGPT 93*  --  66*   ASTSGOT 32  --  23   ALKPHOSPHAT 101*  --  79   TBILIRUBIN 1.0  --  0.9   DBILIRUBIN  --   --  0.4   AMYLASE  --  123* 112*   LIPASE 487*  --  425*   GLUCOSE 102*  --  92           Lipase 425  Amylase 112     Blood Culture   Date Value Ref Range Status   08/19/2016 No growth after 5 days of incubation.  Final         Labs reviewed by me and noted above.    Radiology  US-RUQ  1. Echogenic liver, most commonly hepatic steatosis.  2. Extensive gallbladder sludge.  3. The pancreas is obscured by bowel gas. No CBD dilatation.              Assessment and Plan    Principal Problem:    Acute pancreatitis POA: Yes.  Discussed with Dr. Lewis who felt that this could potentially be associated with her recent sleeve gastrectomy, although the incident is exceedingly low.  I order a right upper quadrant ultrasound and the result reviewed as above.  Case was discussed with Dr. Juarez who will plan for cholecystectomy today.  Patient is n.p.o.  Maintenance IV fluids started.  Her lipase and amylase is still elevated with only slight improvement.    Gallbladder sludge: Her LFTs are downtrending.  Still with persistent right upper quadrant abdominal pain.  Cholecystectomy as planned and discussed above.    Transaminitis: POA yes.  ALT is downtrending.  Alkaline phosphatase has returned to normal level.  Most likely associated with problem above.  Cholecystectomy is planned for today.    Active Problems:    Class 3 severe obesity due to excess calories without serious comorbidity with body mass index (BMI) of 45.0 to 49.9 in adult (HCC) POA: Yes.  Weight loss .      History of weight loss surgery POA: Yes.  Gastric surgeon is on board and plan for cholecystectomy as above.      GERD (gastroesophageal reflux disease) POA: Yes.  Continue PPI.    Acute hypokalemia (new): Potassium 3.3.  Will order IV potassium to replete prior to surgery.  Monitor BMP and serum magnesium tomorrow.      DVT prophylaxis:  Lovenox    CODE STATUS:  FULL CODE    Disposition: TBD.anticipate home discharge once surgically and medically stable.    Case was discussed with Primary RN and Charge Nurse, Medical SW, , and Pharmacist on IDTround in addition to individual(s) mentioned above.    I have performed a physical exam and reviewed and updated ROS as of today, 9/11/2019.  In review of yesterday's note dated, 9/10/2019, there are no changes except as documented above.      Carolina Garcias M.D.

## 2019-09-11 NOTE — PROGRESS NOTES
0730 received  kcl ivpb 10meq x 4 dose over 1 hr each . Nurse went in to check status 0830 et noted iv starting to infiltrate.et dc'det charge notified approx 35ml left in first bag of kcl. US USED TO PLACE IV AT 1000PER ICU NURSE. WHEN RESTARTED KCL ,PATIENT STARTED TO YELL THAT IT HURT REALLY BAD.KCL STOPPED AGAIN  DUE TO IV NOT IN VEIN. CHARGE NOTIFIED ET WILL CALL PICC TEAM IN FOR  IV ACESS.

## 2019-09-11 NOTE — PROGRESS NOTES
Alert and able to let her needs known. C/o nausea and hasn't been tolerating liquids other than water. Addressed her not having a BM in about a week too, having nausea and not being able to tolerate PRN's; also being NPO at present before and after midnight- will pass on to day shift to attempt to medicate for. Cont plan of care, call light within reach and visual checks through the night.    Redness and moisture noted under right breast and mid area pannus; interdry application  And would benefit from nytatin powder

## 2019-09-11 NOTE — PROGRESS NOTES
Surgical Progress Note    Author: Levy Hoffman Date & Time created: 9/10/2019   6:54 PM     Interval Events:  Very pleasant 23 y.o female well known to us. S/p Lap sleeve gastrectomy by Dr. Ganser approximately 5 weeks ago. Admitted last week for nausea, poor oral intake, found to have pancreatitis. At that time abdominal U/S and MRCP negative for gallbladder etiology. Responded to bowel rest and IVF. Patient readmitted today for recurrence of nausea and poor oral intake. Again pancreatitis noted on labs. Today's U/S shows sludge. Patient currently reporting nausea, controlled with zofran    Review of Systems   Constitutional: Negative for chills and fever.   Gastrointestinal: Positive for nausea and vomiting.   Skin: Negative for itching and rash.     Hemodynamics:  Temp (24hrs), Av.3 °C (97.3 °F), Min:36.2 °C (97.2 °F), Max:36.4 °C (97.5 °F)  Temperature: 36.2 °C (97.2 °F)  Pulse  Av.9  Min: 50  Max: 97   Blood Pressure: 107/46     Respiratory:    Respiration: 16, Pulse Oximetry: 93 %           Neuro:  GCS       Fluids:    Intake/Output Summary (Last 24 hours) at 9/10/2019 1854  Last data filed at 9/10/2019 0407  Gross per 24 hour   Intake 1000 ml   Output --   Net 1000 ml     Weight: (!) 152.5 kg (336 lb 3.2 oz)  Current Diet Order   Procedures   • Diet Order Clear Liquid     Physical Exam   Constitutional: She is oriented to person, place, and time. No distress.   Cardiovascular: Normal rate.   Pulmonary/Chest: Effort normal. No respiratory distress.   Abdominal: Soft. She exhibits no distension and no mass. There is tenderness. There is no rebound and no guarding.   No RUQ tenderness, Krishnan's negative.  Mild tenderness LUQ to deep palpation   Neurological: She is alert and oriented to person, place, and time.   Skin: Skin is warm and dry.   Psychiatric: She has a normal mood and affect.   Nursing note and vitals reviewed.    Labs:  Recent Results (from the past 24 hour(s))   CBC WITH DIFFERENTIAL     Collection Time: 09/10/19  1:35 AM   Result Value Ref Range    WBC 13.9 (H) 4.8 - 10.8 K/uL    RBC 5.93 (H) 4.20 - 5.40 M/uL    Hemoglobin 16.4 (H) 12.0 - 16.0 g/dL    Hematocrit 50.0 (H) 37.0 - 47.0 %    MCV 84.3 81.4 - 97.8 fL    MCH 27.7 27.0 - 33.0 pg    MCHC 32.8 (L) 33.6 - 35.0 g/dL    RDW 44.6 35.9 - 50.0 fL    Platelet Count 129 (L) 164 - 446 K/uL    MPV 13.9 (H) 9.0 - 12.9 fL    Neutrophils-Polys 82.30 (H) 44.00 - 72.00 %    Lymphocytes 10.60 (L) 22.00 - 41.00 %    Monocytes 6.10 0.00 - 13.40 %    Eosinophils 0.20 0.00 - 6.90 %    Basophils 0.40 0.00 - 1.80 %    Immature Granulocytes 0.40 0.00 - 0.90 %    Nucleated RBC 0.00 /100 WBC    Neutrophils (Absolute) 11.47 (H) 2.00 - 7.15 K/uL    Lymphs (Absolute) 1.47 1.00 - 4.80 K/uL    Monos (Absolute) 0.85 0.00 - 0.85 K/uL    Eos (Absolute) 0.03 0.00 - 0.51 K/uL    Baso (Absolute) 0.05 0.00 - 0.12 K/uL    Immature Granulocytes (abs) 0.05 0.00 - 0.11 K/uL    NRBC (Absolute) 0.00 K/uL   COMP METABOLIC PANEL    Collection Time: 09/10/19  1:35 AM   Result Value Ref Range    Sodium 140 135 - 145 mmol/L    Potassium 3.8 3.6 - 5.5 mmol/L    Chloride 100 96 - 112 mmol/L    Co2 21 20 - 33 mmol/L    Anion Gap 19.0 (H) 0.0 - 11.9    Glucose 102 (H) 65 - 99 mg/dL    Bun 16 8 - 22 mg/dL    Creatinine 0.68 0.50 - 1.40 mg/dL    Calcium 9.3 8.5 - 10.5 mg/dL    AST(SGOT) 32 12 - 45 U/L    ALT(SGPT) 93 (H) 2 - 50 U/L    Alkaline Phosphatase 101 (H) 30 - 99 U/L    Total Bilirubin 1.0 0.1 - 1.5 mg/dL    Albumin 3.9 3.2 - 4.9 g/dL    Total Protein 7.5 6.0 - 8.2 g/dL    Globulin 3.6 (H) 1.9 - 3.5 g/dL    A-G Ratio 1.1 g/dL   LIPASE    Collection Time: 09/10/19  1:35 AM   Result Value Ref Range    Lipase 487 (H) 11 - 82 U/L   HCG QUAL SERUM    Collection Time: 09/10/19  1:35 AM   Result Value Ref Range    Beta-Hcg Qualitative Serum Negative Negative   ESTIMATED GFR    Collection Time: 09/10/19  1:35 AM   Result Value Ref Range    GFR If African American >60 >60 mL/min/1.73 m 2    GFR  If Non African American >60 >60 mL/min/1.73 m 2   AMYLASE    Collection Time: 09/10/19 10:59 AM   Result Value Ref Range    Amylase 123 (H) 20 - 103 U/L     Medical Decision Making, by Problem:  Active Hospital Problems    Diagnosis   • Acute pancreatitis [K85.90]   • Class 3 severe obesity due to excess calories without serious comorbidity with body mass index (BMI) of 45.0 to 49.9 in adult ([facility]) [E66.01, Z68.42]   • History of weight loss surgery [Z98.84]   • GERD (gastroesophageal reflux disease) [K21.9]     Plan:  In collaboration with Dr. Ganser, in light of gallbladder sludge noted on U/S, our recommendation is for laparoscopic cholecystectomy tomorrow morning. Patient has fairly benign physical exam currently. Risks, benefits, treatment alternatives, and procedure details discussed with patient and her mother. Questions answered in full and she wishes to proceed. Ok for sips/chips up until midnight tonight, NPO thereafter.    Quality Measures:  Quality-Core Measures   Reviewed items::  Labs reviewed, Medications reviewed and Radiology images reviewed  DVT prophylaxis pharmacological::  Enoxaparin (Lovenox)  DVT prophylaxis - mechanical:  SCDs  Ulcer Prophylaxis::  Yes      Discussed patient condition with Family, RN, Patient and Dr. Ganser

## 2019-09-11 NOTE — DISCHARGE PLANNING
Per email notification-Discharge planning help from Ilda FRY for pt Marina [id number] in [id number]. Phone [phone]. Left VM for Ilda to return my call.

## 2019-09-12 VITALS
SYSTOLIC BLOOD PRESSURE: 137 MMHG | BODY MASS INDEX: 41.02 KG/M2 | TEMPERATURE: 97.8 F | WEIGHT: 293 LBS | OXYGEN SATURATION: 94 % | HEIGHT: 71 IN | HEART RATE: 74 BPM | DIASTOLIC BLOOD PRESSURE: 74 MMHG | RESPIRATION RATE: 16 BRPM

## 2019-09-12 PROBLEM — K85.90 ACUTE PANCREATITIS: Status: RESOLVED | Noted: 2019-09-10 | Resolved: 2019-09-12

## 2019-09-12 LAB
ALBUMIN SERPL BCP-MCNC: 3.8 G/DL (ref 3.2–4.9)
ALBUMIN/GLOB SERPL: 1.2 G/DL
ALP SERPL-CCNC: 101 U/L (ref 30–99)
ALT SERPL-CCNC: 82 U/L (ref 2–50)
AMYLASE SERPL-CCNC: 120 U/L (ref 20–103)
ANION GAP SERPL CALC-SCNC: 15 MMOL/L (ref 0–11.9)
AST SERPL-CCNC: 36 U/L (ref 12–45)
BASOPHILS # BLD AUTO: 0.2 % (ref 0–1.8)
BASOPHILS # BLD: 0.03 K/UL (ref 0–0.12)
BILIRUB SERPL-MCNC: 1.3 MG/DL (ref 0.1–1.5)
BUN SERPL-MCNC: 7 MG/DL (ref 8–22)
CALCIUM SERPL-MCNC: 9 MG/DL (ref 8.5–10.5)
CHLORIDE SERPL-SCNC: 100 MMOL/L (ref 96–112)
CO2 SERPL-SCNC: 19 MMOL/L (ref 20–33)
CREAT SERPL-MCNC: 0.6 MG/DL (ref 0.5–1.4)
EOSINOPHIL # BLD AUTO: 0 K/UL (ref 0–0.51)
EOSINOPHIL NFR BLD: 0 % (ref 0–6.9)
ERYTHROCYTE [DISTWIDTH] IN BLOOD BY AUTOMATED COUNT: 43.4 FL (ref 35.9–50)
GLOBULIN SER CALC-MCNC: 3.2 G/DL (ref 1.9–3.5)
GLUCOSE SERPL-MCNC: 129 MG/DL (ref 65–99)
HCT VFR BLD AUTO: 48.4 % (ref 37–47)
HGB BLD-MCNC: 16.2 G/DL (ref 12–16)
IMM GRANULOCYTES # BLD AUTO: 0.12 K/UL (ref 0–0.11)
IMM GRANULOCYTES NFR BLD AUTO: 0.9 % (ref 0–0.9)
LIPASE SERPL-CCNC: 369 U/L (ref 11–82)
LYMPHOCYTES # BLD AUTO: 0.97 K/UL (ref 1–4.8)
LYMPHOCYTES NFR BLD: 7.5 % (ref 22–41)
MAGNESIUM SERPL-MCNC: 1.6 MG/DL (ref 1.5–2.5)
MCH RBC QN AUTO: 27.9 PG (ref 27–33)
MCHC RBC AUTO-ENTMCNC: 33.5 G/DL (ref 33.6–35)
MCV RBC AUTO: 83.4 FL (ref 81.4–97.8)
MONOCYTES # BLD AUTO: 0.24 K/UL (ref 0–0.85)
MONOCYTES NFR BLD AUTO: 1.9 % (ref 0–13.4)
NEUTROPHILS # BLD AUTO: 11.52 K/UL (ref 2–7.15)
NEUTROPHILS NFR BLD: 89.5 % (ref 44–72)
NRBC # BLD AUTO: 0 K/UL
NRBC BLD-RTO: 0 /100 WBC
PATHOLOGY CONSULT NOTE: NORMAL
PLATELET # BLD AUTO: 125 K/UL (ref 164–446)
PMV BLD AUTO: 13.5 FL (ref 9–12.9)
POTASSIUM SERPL-SCNC: 4.2 MMOL/L (ref 3.6–5.5)
PROT SERPL-MCNC: 7 G/DL (ref 6–8.2)
RBC # BLD AUTO: 5.8 M/UL (ref 4.2–5.4)
SODIUM SERPL-SCNC: 134 MMOL/L (ref 135–145)
WBC # BLD AUTO: 12.9 K/UL (ref 4.8–10.8)

## 2019-09-12 PROCEDURE — 99239 HOSP IP/OBS DSCHRG MGMT >30: CPT | Performed by: INTERNAL MEDICINE

## 2019-09-12 PROCEDURE — 302135 SEQUENTIAL COMPRESSION MACHINE: Performed by: INTERNAL MEDICINE

## 2019-09-12 PROCEDURE — A9270 NON-COVERED ITEM OR SERVICE: HCPCS | Performed by: PHYSICIAN ASSISTANT

## 2019-09-12 PROCEDURE — 700102 HCHG RX REV CODE 250 W/ 637 OVERRIDE(OP): Performed by: PHYSICIAN ASSISTANT

## 2019-09-12 PROCEDURE — 80053 COMPREHEN METABOLIC PANEL: CPT

## 2019-09-12 PROCEDURE — 83690 ASSAY OF LIPASE: CPT

## 2019-09-12 PROCEDURE — 82150 ASSAY OF AMYLASE: CPT

## 2019-09-12 PROCEDURE — 83735 ASSAY OF MAGNESIUM: CPT

## 2019-09-12 PROCEDURE — 700111 HCHG RX REV CODE 636 W/ 250 OVERRIDE (IP): Performed by: HOSPITALIST

## 2019-09-12 PROCEDURE — 85025 COMPLETE CBC W/AUTO DIFF WBC: CPT

## 2019-09-12 PROCEDURE — 36415 COLL VENOUS BLD VENIPUNCTURE: CPT

## 2019-09-12 RX ADMIN — OXYCODONE HYDROCHLORIDE 5 MG: 5 TABLET ORAL at 13:34

## 2019-09-12 RX ADMIN — PROCHLORPERAZINE EDISYLATE 10 MG: 5 INJECTION INTRAMUSCULAR; INTRAVENOUS at 13:34

## 2019-09-12 RX ADMIN — PROCHLORPERAZINE EDISYLATE 10 MG: 5 INJECTION INTRAMUSCULAR; INTRAVENOUS at 09:00

## 2019-09-12 RX ADMIN — OXYCODONE HYDROCHLORIDE 5 MG: 5 TABLET ORAL at 08:55

## 2019-09-12 RX ADMIN — OXYCODONE HYDROCHLORIDE 5 MG: 5 TABLET ORAL at 02:44

## 2019-09-12 ASSESSMENT — ENCOUNTER SYMPTOMS
FEVER: 0
VOMITING: 0
ABDOMINAL PAIN: 1
HEARTBURN: 0
NAUSEA: 1
CHILLS: 0

## 2019-09-12 ASSESSMENT — LIFESTYLE VARIABLES: EVER_SMOKED: NEVER

## 2019-09-12 NOTE — DISCHARGE SUMMARY
HOSPITAL MEDICINE DISCHARGE SUMMARY    DATE OF ADMISSION:  9/10/2019    DATE OF DISCHARGE:  9/12/2019    CHIEF COMPLAINT ON ADMISSION  Chief Complaint   Patient presents with   • N/V     x 1 week       CODE STATUS  Full Code    No Known Allergies    DISCHARGE PROBLEM LIST  Principal Problem (Resolved):    Acute pancreatitis POA: Unknown  Gallbladder sludge  Epigastric and right upper quadrant abdominal pain, acute  Active Problems:    Class 3 severe obesity due to excess calories without serious comorbidity with body mass index (BMI) of 45.0 to 49.9 in adult (HCC) POA: Unknown    History of weight loss surgery POA: Unknown    GERD (gastroesophageal reflux disease) POA: Unknown  Hepatic steatosis, suspect due to obesity.    MEDICATIONS ON DISCHARGE   Marina Kenzie Sienna   Home Medication Instructions MAXI:[id number]    Printed on:09/12/19 7629   Medication Information                      omeprazole (PRILOSEC) 20 MG delayed-release capsule  Take 20 mg by mouth every day.             ondansetron (ZOFRAN ODT) 4 MG TABLET DISPERSIBLE  Take 4 mg by mouth every 6 hours as needed for Nausea.                 CONSULTATIONS  Dr. Ganser, GI surgery    HPI & HOSPITAL COURSE  23 years old woman who is status post sleeve gastrectomy, re-presented with epigastric and right upper quadrant abdominal pain, found to have acute pancreatitis, most likely related to gallbladder sludge.  She was conservatively manage with bowel rest and IV fluid and pain management.  However, this proved ineffective.  After discussion with Dr. Ganser regarding her clinical presentation, I request his consultation in order right upper quadrant ultrasound which reveals that there were quite a bit of sludge in the gallbladder.  Patient subsequently went to the OR for a cholecystectomy without any complication.  Postoperatively, she recovered well on the surgical yanez.  Her diet was advanced to a GI full liquid diet per recommendations of the surgeon.  She  will continue this diet for at least a week after discharge per her surgeon's recommendation and then advance slowly to her home diet.    Therefore, she is discharged in good and stable condition to home with close outpatient follow-up.    The patient met 2-midnight criteria for an inpatient stay at the time of discharge.    SPECIFIC OUTPATIENT FOLLOW-UP RECOMMENDATIONS  Postoperative follow-up with general surgery.  Wound check.  Gallbladder pathology specimen.    FOLLOW UP  No future appointments.  John H Ganser, M.D.  [address] [place] [id number]  [phone]    On 9/19/2019        DIET  Orders Placed This Encounter   Procedures   • Diet Order Clear Liquid     Standing Status:   Standing     Number of Occurrences:   1     Order Specific Question:   Diet:     Answer:   Clear Liquid [10]     Order Specific Question:   Miscellaneous modifications:     Answer:   Gastric Bypass [3]       ACTIVITY  No strenuous excercise.    PROCEDURES  Laparoscopic cholecystectomy    PERTINENT RESULTS  Right upper quadrant ultrasound show echogenic liver, most commonly hepatic steatosis, extensive gallbladder sludge.      Total time of the discharge process exceeds 36 minutes.      Carolina Garcias M.D.

## 2019-09-12 NOTE — DIETARY
"Nutrition Services: Update   Day 2 of admit.  Kenzie Gray is a 23 y.o. female with admitting DX of Pancreatitis    Pt is currently on clear liquid, gastric bypass diet. Current diet only provides 6 tablespoons of liquid per meal. Pt had gastric sleeve procedure on 8/5. Suspect pt will be able to tolerate more than 6 tablespoons per meal. However per RN note pt \"gagging is nauseated spitting in emesis bag clear spit only\".  Per PA note \"Continue Bariatric clears today. Ok to advance to Bariatric full liquids tomorrow\". Pt has laparoscopic cholecystectomy on 9/11.     Malnutrition Risk from 9/10: Pt does fit criteria of severe acute illness malnutrition r/t recurrent pancreatitis s/p gastric sleeve, evidenced by severe weight loss of 10% in 5 weeks and PO intake of < 50% of estimated energy needs for past 5 weeks.   Sudden weight loss and reduced PO intake s/p gastric sleeve expected, however pt has been experiencing this to an extreme.     Recommendations/Plan:  1. Consider removing gastric bypass diet restriction since diet only provides 6 Tablespoons of liquid.    2. If unable to advance diet past clears and pt unable to tolerate diet advancement. Recommend starting nutrition support.   3. Encourage intake of meals  4. Document intake of all meals as % taken in ADL's to provide interdisciplinary communication across all shifts.   5. Monitor weight.  6. Nutrition rep will continue to see patient for ongoing meal and snack preferences.  7. Obtain supplement order per RD as needed.    RD following  "

## 2019-09-12 NOTE — PROGRESS NOTES
Received from recovery around 2115. Alert and able to let her needs known with family at bedside. C/o nausea but no pain. 4 lap sites CDI. Vital sign completed. Cont plan of care, call light within reach and visual checks through the night  Requesting to not take medications now but to try them with breakfast; rescheduled them to 9a

## 2019-09-12 NOTE — ANESTHESIA POSTPROCEDURE EVALUATION
Patient: Kenzie Gray    Procedure Summary     Date:  09/11/19 Room / Location:  [facility] 09 / SURGERY [facility]    Anesthesia Start:  1837 Anesthesia Stop:  1940    Procedure:  CHOLECYSTECTOMY, LAPAROSCOPIC (Abdomen) Diagnosis:  (Acute pancreatitis)    Surgeon:  John H Ganser, M.D. Responsible Provider:  Colten Carbajal M.D.    Anesthesia Type:  general ASA Status:  3 - Emergent          Final Anesthesia Type: general  Last vitals  BP   Blood Pressure: 117/59    Temp   36.5 °C (97.7 °F)    Pulse   Pulse: 89   Resp   18    SpO2   96 %      Anesthesia Post Evaluation    Patient location during evaluation: PACU  Patient participation: complete - patient participated  Level of consciousness: awake and alert    Airway patency: patent  Anesthetic complications: no  Cardiovascular status: hemodynamically stable  Respiratory status: acceptable  Hydration status: euvolemic    PONV: none           Nurse Pain Score: 3 (NPRS)

## 2019-09-12 NOTE — CARE PLAN
Problem: Fluid Volume:  Goal: Will maintain balanced intake and output  Outcome: PROGRESSING SLOWER THAN EXPECTED  Ivf as ordered, decreased PO intake  Problem: Pain  Goal: Alleviation of Pain or a reduction in pain to the patient's comfort goal  Outcome: PROGRESSING AS EXPECTED  Medicate as requested post lap francine

## 2019-09-12 NOTE — OR NURSING
1930 Received pt from OR, awake, with O2 support of 3 L/min, respirations regular and spontaneous, VS WNL. With patent g.20 IV at left forearm infusing well. Pt has 3 lap sites dressed which are CDI.    1935 Pt verbalized pain of 6/10, medication given (See MAR).    1940 Dc'd O2 support, pt tolerated room air, O2 sat WNL.    1945 Pt nauseated, medication given (See MAR).    2020 Updated mother (Kyra) thru call.    2035 Report given to Consuelo ANDERSEN, pt awake and alert on room air, VS WNL. Peripheral IV g.20 at left forearm, infusing well with full level of LR.  With 3 lap sites, CDI. Pt pain level at 2/10, tolerable, not nauseated.    2050 Updated mother (Kyra) thru call, pt awaiting transport.    2105 Pt to room with 2 transport.

## 2019-09-12 NOTE — PROGRESS NOTES
0900 C/O ABD PAIN.  PAIN LEVEL 6. GAGGING  IS NAUSEATED  SPITTING IN EMESIS BAG CLEAR SPIT ONLY. PAIN ET NAUSEA MEDS GIVEN. AT 1000 PATIENT BETTER ET UP WALKING WITH HER MOM

## 2019-09-12 NOTE — ANESTHESIA TIME REPORT
Anesthesia Start and Stop Event Times     Date Time Event    9/11/2019 1800 Ready for Procedure     1837 Anesthesia Start     1940 Anesthesia Stop        Responsible Staff  09/11/19    Name Role Begin End    Colten Carbajal M.D. Anesth 1837 1940        Preop Diagnosis (Free Text):  Pre-op Diagnosis     Acute pancreatitis        Preop Diagnosis (Codes):    Post op Diagnosis  Acute cholecystitis      Premium Reason  A. 3PM - 7AM    Comments: emergency

## 2019-09-12 NOTE — PROGRESS NOTES
Surgical Progress Note    Author: Levy Hoffman Date & Time created: 2019   10:17 AM     Interval Events:  S/p laparoscopic cholecystectomy -POD#1, dong well; patient yon Bariatric clears well; pain controlled, nausea improving    Review of Systems   Constitutional: Negative for chills and fever.   Cardiovascular: Negative for leg swelling.   Gastrointestinal: Positive for abdominal pain and nausea. Negative for heartburn and vomiting.   Skin: Negative for itching and rash.     Hemodynamics:  Temp (24hrs), Av.8 °C (98.3 °F), Min:36.5 °C (97.7 °F), Max:37.6 °C (99.7 °F)  Temperature: 36.6 °C (97.8 °F)  Pulse  Av  Min: 50  Max: 103   Blood Pressure: 137/74     Respiratory:    Respiration: 16, Pulse Oximetry: 94 %           Neuro:  GCS       Fluids:    Intake/Output Summary (Last 24 hours) at 2019 1017  Last data filed at 2019 0750  Gross per 24 hour   Intake 1240 ml   Output 10 ml   Net 1230 ml       Current Diet Order   Procedures   • Diet Order Clear Liquid     Physical Exam   Constitutional: She is oriented to person, place, and time. No distress.   Pulmonary/Chest: Effort normal. No respiratory distress.   Abdominal: Soft.   Tegaderms c/d/i   Neurological: She is alert and oriented to person, place, and time.   Skin: Skin is warm and dry.   Psychiatric: She has a normal mood and affect.   Nursing note and vitals reviewed.    Labs:  Recent Results (from the past 24 hour(s))   LIPASE    Collection Time: 19  3:03 AM   Result Value Ref Range    Lipase 369 (H) 11 - 82 U/L   AMYLASE    Collection Time: 19  3:03 AM   Result Value Ref Range    Amylase 120 (H) 20 - 103 U/L   CBC WITH DIFFERENTIAL    Collection Time: 19  3:03 AM   Result Value Ref Range    WBC 12.9 (H) 4.8 - 10.8 K/uL    RBC 5.80 (H) 4.20 - 5.40 M/uL    Hemoglobin 16.2 (H) 12.0 - 16.0 g/dL    Hematocrit 48.4 (H) 37.0 - 47.0 %    MCV 83.4 81.4 - 97.8 fL    MCH 27.9 27.0 - 33.0 pg    MCHC 33.5 (L) 33.6 - 35.0 g/dL     RDW 43.4 35.9 - 50.0 fL    Platelet Count 125 (L) 164 - 446 K/uL    MPV 13.5 (H) 9.0 - 12.9 fL    Neutrophils-Polys 89.50 (H) 44.00 - 72.00 %    Lymphocytes 7.50 (L) 22.00 - 41.00 %    Monocytes 1.90 0.00 - 13.40 %    Eosinophils 0.00 0.00 - 6.90 %    Basophils 0.20 0.00 - 1.80 %    Immature Granulocytes 0.90 0.00 - 0.90 %    Nucleated RBC 0.00 /100 WBC    Neutrophils (Absolute) 11.52 (H) 2.00 - 7.15 K/uL    Lymphs (Absolute) 0.97 (L) 1.00 - 4.80 K/uL    Monos (Absolute) 0.24 0.00 - 0.85 K/uL    Eos (Absolute) 0.00 0.00 - 0.51 K/uL    Baso (Absolute) 0.03 0.00 - 0.12 K/uL    Immature Granulocytes (abs) 0.12 (H) 0.00 - 0.11 K/uL    NRBC (Absolute) 0.00 K/uL   Comp Metabolic Panel    Collection Time: 09/12/19  3:03 AM   Result Value Ref Range    Sodium 134 (L) 135 - 145 mmol/L    Potassium 4.2 3.6 - 5.5 mmol/L    Chloride 100 96 - 112 mmol/L    Co2 19 (L) 20 - 33 mmol/L    Anion Gap 15.0 (H) 0.0 - 11.9    Glucose 129 (H) 65 - 99 mg/dL    Bun 7 (L) 8 - 22 mg/dL    Creatinine 0.60 0.50 - 1.40 mg/dL    Calcium 9.0 8.5 - 10.5 mg/dL    AST(SGOT) 36 12 - 45 U/L    ALT(SGPT) 82 (H) 2 - 50 U/L    Alkaline Phosphatase 101 (H) 30 - 99 U/L    Total Bilirubin 1.3 0.1 - 1.5 mg/dL    Albumin 3.8 3.2 - 4.9 g/dL    Total Protein 7.0 6.0 - 8.2 g/dL    Globulin 3.2 1.9 - 3.5 g/dL    A-G Ratio 1.2 g/dL   MAGNESIUM    Collection Time: 09/12/19  3:03 AM   Result Value Ref Range    Magnesium 1.6 1.5 - 2.5 mg/dL   ESTIMATED GFR    Collection Time: 09/12/19  3:03 AM   Result Value Ref Range    GFR If African American >60 >60 mL/min/1.73 m 2    GFR If Non African American >60 >60 mL/min/1.73 m 2   Histology Request    Collection Time: 09/12/19  8:24 AM   Result Value Ref Range    Pathology Request Sent to Histo      Medical Decision Making, by Problem:  Active Hospital Problems    Diagnosis   • Acute pancreatitis [K85.90]   • Class 3 severe obesity due to excess calories without serious comorbidity with body mass index (BMI) of 45.0 to 49.9  in adult ([facility]) [E66.01, Z68.42]   • History of weight loss surgery [Z98.84]   • GERD (gastroesophageal reflux disease) [K21.9]     Plan:  Pt doing well.  Continue Bariatric clears today. Ok to advance to Bariatric full liquids tomorrow.  Ok to d/c home when cleared by medicine team.   Continue full liquids until postop check.  F/u with our office next week.  Pt counseled re: diet, activity, wound care, home med's.    Quality Measures:  Quality-Core Measures   Brandon catheter::  No Brandon  DVT prophylaxis pharmacological::  Enoxaparin (Lovenox)  DVT prophylaxis - mechanical:  SCDs  Ulcer Prophylaxis::  Yes      Discussed patient condition with Family, Patient and Dr. Ganser

## 2019-09-12 NOTE — PROGRESS NOTES
Her ultrasound shows her gallbladder is filled with sludge. We discussed risks and alternatives to cholecystectomy and she wishes to proceed. Questions answered.

## 2019-09-12 NOTE — CARE PLAN
Problem: Nutritional:  Goal: Achieve adequate nutritional intake  Description  Diet will advance beyond NPO/clear liquids and patient will consume ~50% of meals   Outcome: PROGRESSING SLOWER THAN EXPECTED

## 2019-09-12 NOTE — ANESTHESIA PREPROCEDURE EVALUATION
Relevant Problems   CARDIAC   (+) Migraine headache      GI   (+) GERD (gastroesophageal reflux disease)       Physical Exam    Airway   Mallampati: II  TM distance: >3 FB  Neck ROM: full       Cardiovascular - normal exam  Rhythm: regular  Rate: normal  (-) murmur     Dental - normal exam         Pulmonary - normal exam  Breath sounds clear to auscultation     Abdominal   Abdomen: tender     Neurological - normal exam                 Anesthesia Plan    ASA 3- EMERGENT   ASA physical status 3 criteria: morbid obesity - BMI greater than or equal to 40ASA physical status emergent criteria: acute peritonitis    Plan - general       Airway plan will be ETT        Induction: intravenous    Postoperative Plan: Postoperative administration of opioids is intended.    Pertinent diagnostic labs and testing reviewed    Informed Consent:    Anesthetic plan and risks discussed with patient.    Use of blood products discussed with: patient whom consented to blood products.

## 2019-09-12 NOTE — OP REPORT
DATE OF SERVICE:  09/11/2019    PREOPERATIVE DIAGNOSES:  Cholecystitis, pancreatitis.    POSTOPERATIVE DIAGNOSES:  Cholecystitis, pancreatitis.    PROCEDURE:  Laparoscopic cholecystectomy.    SURGEON:  John H. Ganser, MD    ASSISTANT:  Levy Hoffman PA-C    ANESTHESIA:  General.    ANESTHESIOLOGIST:  Colten Carbajal MD    INDICATIONS:  The patient is a 23-year-old female who is about 5 weeks out   from sleeve gastrectomy.  She was admitted a week and a half ago with mild   pancreatitis.  Ultrasound of her gallbladder was normal, as was MRCP.  She   re-presented again.  Workup this time does show elevated lipase, but also   extensive sludge within the gallbladder.  Risks, benefits and alternatives to   laparoscopic cholecystectomy were outlined in detail.  Questions answered and   she wished to proceed.  Of note is her liver functions remained normal.    FINDINGS:  Gallbladder was markedly distended and filled with sludge.  She had   a somewhat narrow cystic duct, but there was some sludge within the duct   itself.    DESCRIPTION OF PROCEDURE:  Patient was identified and general anesthetic   administered.  Her abdomen was prepped and draped in the usual sterile   fashion.  Local anesthesia of 0.5% Marcaine with epinephrine was injected   prior to making skin incision.  Small incision was made at a prior   laparoscopic site in the supraumbilical location and the Veress needle passed.    The abdomen was insufflated with carbon dioxide without incident and a 5 mm   blunt trocar and 5 mm 30-degree scope inserted.  Epigastric 12 and 2 right   subcostal 5 mm trocars were placed, 2 of these at prior laparoscopic sites.    The abdomen was explored and the above findings noted.  The gallbladder was   elevated and the peritoneum overlying the neck of the gallbladder was elevated   with a combination of blunt and cautery dissection.  A [place] dissector was   used to circumferentially dissect the cystic duct and the cystic  artery   anatomy was clearly defined.  As noted above, the cystic duct was somewhat   narrow.  Clips were placed proximally and distally on both the duct and the   artery and they were transected.  Again, there was a small amount of sludge   within the cystic duct itself.  The gallbladder was removed from the liver   surface.  The gallbladder was entered as it was markedly distended and this   was emptied with the suction device and contained a large amount of thick   sludge.  The gallbladder was then able to be removed from liver surface and   placed in endoscopic bag and withdrawn through the epigastric trocar site.    The abdomen was irrigated and hemostasis assured.  A small posterior arterial   branch was clipped assuring hemostasis.  Trocars were then withdrawn.  The   abdomen deflated and the incisions closed with Vicryl.  Sterile dressings were   applied and patient returned to recovery room in stable condition.       ____________________________________     JOHN H. GANSER, MD [facility] / [place]    DD:  09/11/2019 19:27:06  DT:  09/11/2019 22:39:02    D#:  [id number]  Job#:  [id number]

## 2019-09-12 NOTE — DISCHARGE INSTRUCTIONS
Discharge Instructions    Discharged to home by car with relative. Discharged via wheelchair, hospital escort: Yes.  Special equipment needed: Not Applicable    Be sure to schedule a follow-up appointment with your primary care doctor or any specialists as instructed.     Discharge Plan:   Influenza Vaccine Indication: Patient Refuses    I understand that a diet low in cholesterol, fat, and sodium is recommended for good health. Unless I have been given specific instructions below for another diet, I accept this instruction as my diet prescription.   Other diet:   Discharge Instructions    Discharged to home by car with relative. Discharged via wheelchair, hospital escort: Yes.  Special equipment needed: Not Applicable    Be sure to schedule a follow-up appointment with your primary care doctor or any specialists as instructed.     Discharge Plan:   Influenza Vaccine Indication: Patient Refuses    I understand that a diet low in cholesterol, fat, and sodium is recommended for good health. Unless I have been given specific instructions below for another diet, I accept this instruction as my diet prescription.   Other diet:clear liq       Special Instructions: None    · Is patient discharged on Warfarin / Coumadin?   No     Depression / Suicide Risk    As you are discharged from this [facility] facility, it is important to learn how to keep safe from harming yourself.    Recognize the warning signs:  · Abrupt changes in personality, positive or negative- including increase in energy   · Giving away possessions  · Change in eating patterns- significant weight changes-  positive or negative  · Change in sleeping patterns- unable to sleep or sleeping all the time   · Unwillingness or inability to communicate  · Depression  · Unusual sadness, discouragement and loneliness  · Talk of wanting to die  · Neglect of personal appearance   · Rebelliousness- reckless behavior  · Withdrawal from people/activities they  love  · Confusion- inability to concentrate     If you or a loved one observes any of these behaviors or has concerns about self-harm, here's what you can do:  · Talk about it- your feelings and reasons for harming yourself  · Remove any means that you might use to hurt yourself (examples: pills, rope, extension cords, firearm)  · Get professional help from the community (Mental Health, Substance Abuse, psychological counseling)  · Do not be alone:Call your Safe Contact- someone whom you trust who will be there for you.  · Call your local CRISIS HOTLINE 916-0726 or 541-030-6411  · Call your local Children's Mobile Crisis Response Team [place] (929) 026-9481 or www.Mobile Theory  · Call the toll free National Suicide Prevention Hotlines   · National Suicide Prevention Lifeline 727-338-TSLR (5039)  · National Hope Line Network 800-SUICIDE (662-8061)    clear liq    Special Instructions: None    · Is patient discharged on Warfarin / Coumadin?   No     Depression / Suicide Risk    As you are discharged from this [facility] facility, it is important to learn how to keep safe from harming yourself.    Recognize the warning signs:  · Abrupt changes in personality, positive or negative- including increase in energy   · Giving away possessions  · Change in eating patterns- significant weight changes-  positive or negative  · Change in sleeping patterns- unable to sleep or sleeping all the time   · Unwillingness or inability to communicate  · Depression  · Unusual sadness, discouragement and loneliness  · Talk of wanting to die  · Neglect of personal appearance   · Rebelliousness- reckless behavior  · Withdrawal from people/activities they love  · Confusion- inability to concentrate     If you or a loved one observes any of these behaviors or has concerns about self-harm, here's what you can do:  · Talk about it- your feelings and reasons for harming yourself  · Remove any means that you might use to hurt yourself  (examples: pills, rope, extension cords, firearm)  · Get professional help from the community (Mental Health, Substance Abuse, psychological counseling)  · Do not be alone:Call your Safe Contact- someone whom you trust who will be there for you.  · Call your local CRISIS HOTLINE 938-2904 or 693-384-6959  · Call your local Children's Mobile Crisis Response Team [place] (815) 066-5416 or www.Addepar  · Call the toll free National Suicide Prevention Hotlines   · National Suicide Prevention Lifeline 034-114-QPTB (9616)  · National Hope Line Network 800-SUICIDE (435-2607)    Dr. Garcias's discharge instructions:    DIET:  Clear liquid diet per ESME Garcia, recommendations for next week then resume your usual diet slowly afterward.    ACTIVITY: No strenuous excercise.    DIAGNOSIS: Pancreatitis, gallbladder sludge    Return to ER if fever greater than 38 degree Celsius or 100.4 degree Fahrenheit, worsening pain, chest pain at rest or associated with exertion, worsening shortness of breath, bloody coughs, bloody bowel movement, severe unrelenting abdominal pain, focal weakness.    Carolina Garcias M.D.

## 2019-09-26 ENCOUNTER — HOSPITAL ENCOUNTER (INPATIENT)
Facility: MEDICAL CENTER | Age: 24
LOS: 2 days | DRG: 392 | End: 2019-09-29
Attending: EMERGENCY MEDICINE | Admitting: SURGERY
Payer: COMMERCIAL

## 2019-09-26 ENCOUNTER — APPOINTMENT (OUTPATIENT)
Dept: RADIOLOGY | Facility: MEDICAL CENTER | Age: 24
DRG: 392 | End: 2019-09-26
Attending: EMERGENCY MEDICINE
Payer: COMMERCIAL

## 2019-09-26 DIAGNOSIS — E86.0 DEHYDRATION: ICD-10-CM

## 2019-09-26 DIAGNOSIS — R11.2 INTRACTABLE VOMITING WITH NAUSEA, UNSPECIFIED VOMITING TYPE: ICD-10-CM

## 2019-09-26 DIAGNOSIS — K85.90 ACUTE PANCREATITIS WITHOUT INFECTION OR NECROSIS, UNSPECIFIED PANCREATITIS TYPE: ICD-10-CM

## 2019-09-26 LAB
ALBUMIN SERPL BCP-MCNC: 4.3 G/DL (ref 3.2–4.9)
ALBUMIN/GLOB SERPL: 1.2 G/DL
ALP SERPL-CCNC: 118 U/L (ref 30–99)
ALT SERPL-CCNC: 127 U/L (ref 2–50)
ANION GAP SERPL CALC-SCNC: 20 MMOL/L (ref 0–11.9)
APPEARANCE UR: CLEAR
AST SERPL-CCNC: 45 U/L (ref 12–45)
BASOPHILS # BLD AUTO: 0.4 % (ref 0–1.8)
BASOPHILS # BLD: 0.05 K/UL (ref 0–0.12)
BILIRUB SERPL-MCNC: 1.2 MG/DL (ref 0.1–1.5)
BILIRUB UR QL STRIP.AUTO: ABNORMAL
BUN SERPL-MCNC: 12 MG/DL (ref 8–22)
CALCIUM SERPL-MCNC: 9.8 MG/DL (ref 8.5–10.5)
CAOX CRY #/AREA URNS HPF: ABNORMAL /HPF
CHLORIDE SERPL-SCNC: 98 MMOL/L (ref 96–112)
CO2 SERPL-SCNC: 19 MMOL/L (ref 20–33)
COLOR UR: ABNORMAL
CREAT SERPL-MCNC: 0.6 MG/DL (ref 0.5–1.4)
EOSINOPHIL # BLD AUTO: 0.05 K/UL (ref 0–0.51)
EOSINOPHIL NFR BLD: 0.4 % (ref 0–6.9)
EPI CELLS #/AREA URNS HPF: ABNORMAL /HPF
ERYTHROCYTE [DISTWIDTH] IN BLOOD BY AUTOMATED COUNT: 42.6 FL (ref 35.9–50)
GLOBULIN SER CALC-MCNC: 3.6 G/DL (ref 1.9–3.5)
GLUCOSE SERPL-MCNC: 104 MG/DL (ref 65–99)
GLUCOSE UR STRIP.AUTO-MCNC: NEGATIVE MG/DL
HCG SERPL QL: NEGATIVE
HCT VFR BLD AUTO: 53.2 % (ref 37–47)
HGB BLD-MCNC: 17.4 G/DL (ref 12–16)
IMM GRANULOCYTES # BLD AUTO: 0.06 K/UL (ref 0–0.11)
IMM GRANULOCYTES NFR BLD AUTO: 0.5 % (ref 0–0.9)
KETONES UR STRIP.AUTO-MCNC: >=160 MG/DL
LACTATE BLD-SCNC: 2.3 MMOL/L (ref 0.5–2)
LEUKOCYTE ESTERASE UR QL STRIP.AUTO: ABNORMAL
LIPASE SERPL-CCNC: 521 U/L (ref 11–82)
LYMPHOCYTES # BLD AUTO: 1.85 K/UL (ref 1–4.8)
LYMPHOCYTES NFR BLD: 14.4 % (ref 22–41)
MCH RBC QN AUTO: 26.8 PG (ref 27–33)
MCHC RBC AUTO-ENTMCNC: 32.7 G/DL (ref 33.6–35)
MCV RBC AUTO: 81.8 FL (ref 81.4–97.8)
MICRO URNS: ABNORMAL
MONOCYTES # BLD AUTO: 1.02 K/UL (ref 0–0.85)
MONOCYTES NFR BLD AUTO: 8 % (ref 0–13.4)
NEUTROPHILS # BLD AUTO: 9.8 K/UL (ref 2–7.15)
NEUTROPHILS NFR BLD: 76.3 % (ref 44–72)
NITRITE UR QL STRIP.AUTO: NEGATIVE
NRBC # BLD AUTO: 0 K/UL
NRBC BLD-RTO: 0 /100 WBC
PH UR STRIP.AUTO: 6.5 [PH] (ref 5–8)
PLATELET # BLD AUTO: 170 K/UL (ref 164–446)
PMV BLD AUTO: 12.7 FL (ref 9–12.9)
POTASSIUM SERPL-SCNC: 3.3 MMOL/L (ref 3.6–5.5)
PROT SERPL-MCNC: 7.9 G/DL (ref 6–8.2)
PROT UR QL STRIP: 30 MG/DL
RBC # BLD AUTO: 6.5 M/UL (ref 4.2–5.4)
RBC # URNS HPF: ABNORMAL /HPF
RBC UR QL AUTO: NEGATIVE
SODIUM SERPL-SCNC: 137 MMOL/L (ref 135–145)
SP GR UR STRIP.AUTO: >=1.045
UROBILINOGEN UR STRIP.AUTO-MCNC: 1 MG/DL
WBC # BLD AUTO: 12.8 K/UL (ref 4.8–10.8)
WBC #/AREA URNS HPF: ABNORMAL /HPF

## 2019-09-26 PROCEDURE — 700105 HCHG RX REV CODE 258: Performed by: EMERGENCY MEDICINE

## 2019-09-26 PROCEDURE — 80053 COMPREHEN METABOLIC PANEL: CPT

## 2019-09-26 PROCEDURE — G0378 HOSPITAL OBSERVATION PER HR: HCPCS

## 2019-09-26 PROCEDURE — 700117 HCHG RX CONTRAST REV CODE 255: Performed by: EMERGENCY MEDICINE

## 2019-09-26 PROCEDURE — 96375 TX/PRO/DX INJ NEW DRUG ADDON: CPT

## 2019-09-26 PROCEDURE — 700111 HCHG RX REV CODE 636 W/ 250 OVERRIDE (IP): Performed by: EMERGENCY MEDICINE

## 2019-09-26 PROCEDURE — 94760 N-INVAS EAR/PLS OXIMETRY 1: CPT

## 2019-09-26 PROCEDURE — 70450 CT HEAD/BRAIN W/O DYE: CPT

## 2019-09-26 PROCEDURE — 99285 EMERGENCY DEPT VISIT HI MDM: CPT

## 2019-09-26 PROCEDURE — 84703 CHORIONIC GONADOTROPIN ASSAY: CPT

## 2019-09-26 PROCEDURE — 96366 THER/PROPH/DIAG IV INF ADDON: CPT

## 2019-09-26 PROCEDURE — 96365 THER/PROPH/DIAG IV INF INIT: CPT

## 2019-09-26 PROCEDURE — 81001 URINALYSIS AUTO W/SCOPE: CPT

## 2019-09-26 PROCEDURE — 700101 HCHG RX REV CODE 250: Performed by: EMERGENCY MEDICINE

## 2019-09-26 PROCEDURE — 36415 COLL VENOUS BLD VENIPUNCTURE: CPT

## 2019-09-26 PROCEDURE — 83690 ASSAY OF LIPASE: CPT

## 2019-09-26 PROCEDURE — 85025 COMPLETE CBC W/AUTO DIFF WBC: CPT

## 2019-09-26 PROCEDURE — 74177 CT ABD & PELVIS W/CONTRAST: CPT

## 2019-09-26 PROCEDURE — 83605 ASSAY OF LACTIC ACID: CPT

## 2019-09-26 RX ORDER — ONDANSETRON 2 MG/ML
4 INJECTION INTRAMUSCULAR; INTRAVENOUS ONCE
Status: COMPLETED | OUTPATIENT
Start: 2019-09-26 | End: 2019-09-26

## 2019-09-26 RX ORDER — LORAZEPAM 2 MG/ML
0.5 INJECTION INTRAMUSCULAR ONCE
Status: COMPLETED | OUTPATIENT
Start: 2019-09-26 | End: 2019-09-26

## 2019-09-26 RX ORDER — SODIUM CHLORIDE 9 MG/ML
2000 INJECTION, SOLUTION INTRAVENOUS ONCE
Status: COMPLETED | OUTPATIENT
Start: 2019-09-26 | End: 2019-09-26

## 2019-09-26 RX ORDER — DIPHENHYDRAMINE HYDROCHLORIDE 50 MG/ML
25 INJECTION INTRAMUSCULAR; INTRAVENOUS ONCE
Status: COMPLETED | OUTPATIENT
Start: 2019-09-26 | End: 2019-09-26

## 2019-09-26 RX ORDER — METOCLOPRAMIDE HYDROCHLORIDE 5 MG/ML
10 INJECTION INTRAMUSCULAR; INTRAVENOUS ONCE
Status: COMPLETED | OUTPATIENT
Start: 2019-09-26 | End: 2019-09-26

## 2019-09-26 RX ADMIN — DIPHENHYDRAMINE HYDROCHLORIDE 25 MG: 50 INJECTION INTRAMUSCULAR; INTRAVENOUS at 20:03

## 2019-09-26 RX ADMIN — IOHEXOL 100 ML: 350 INJECTION, SOLUTION INTRAVENOUS at 21:34

## 2019-09-26 RX ADMIN — ONDANSETRON 4 MG: 2 INJECTION INTRAMUSCULAR; INTRAVENOUS at 18:54

## 2019-09-26 RX ADMIN — THIAMINE HYDROCHLORIDE: 100 INJECTION, SOLUTION INTRAMUSCULAR; INTRAVENOUS at 20:05

## 2019-09-26 RX ADMIN — METOCLOPRAMIDE 10 MG: 5 INJECTION, SOLUTION INTRAMUSCULAR; INTRAVENOUS at 18:54

## 2019-09-26 RX ADMIN — SODIUM CHLORIDE 2000 ML: 9 INJECTION, SOLUTION INTRAVENOUS at 18:53

## 2019-09-26 RX ADMIN — LORAZEPAM 0.5 MG: 2 INJECTION INTRAMUSCULAR; INTRAVENOUS at 20:05

## 2019-09-26 NOTE — ED TRIAGE NOTES
Patient to ED with complaints of severe nausea and vomiting since surgery on 9/11. She had a cholecystectomy by Dr. Gansert. She was in the office today and send for possible rehydration and lab check. She recently (8/5) had a gastric sleeve. Has not beeing keep much food or water down at home for the last few days. No abdominal pain. Dr. Gansert ask her to pass on that he would like a telephone call when she is evaluated by ERP.     No fevers or chills. No blood in vomit. +blurred vision x5 days. No unilateral weakness or numbness.     Pt educated on ED process and asked to wait in lobby. Patient educated on importance of alerting staff to new or worsening symptoms or concerns.

## 2019-09-27 ENCOUNTER — APPOINTMENT (OUTPATIENT)
Dept: RADIOLOGY | Facility: MEDICAL CENTER | Age: 24
DRG: 392 | End: 2019-09-27
Attending: PHYSICIAN ASSISTANT
Payer: COMMERCIAL

## 2019-09-27 LAB
CHOLEST SERPL-MCNC: 109 MG/DL (ref 100–199)
HDLC SERPL-MCNC: 23 MG/DL
LACTATE BLD-SCNC: 1.4 MMOL/L (ref 0.5–2)
LDLC SERPL CALC-MCNC: 69 MG/DL
TRIGL SERPL-MCNC: 83 MG/DL (ref 0–149)

## 2019-09-27 PROCEDURE — 3E02340 INTRODUCTION OF INFLUENZA VACCINE INTO MUSCLE, PERCUTANEOUS APPROACH: ICD-10-PCS | Performed by: SURGERY

## 2019-09-27 PROCEDURE — 90686 IIV4 VACC NO PRSV 0.5 ML IM: CPT | Performed by: SURGERY

## 2019-09-27 PROCEDURE — 700111 HCHG RX REV CODE 636 W/ 250 OVERRIDE (IP): Performed by: PHYSICIAN ASSISTANT

## 2019-09-27 PROCEDURE — 700111 HCHG RX REV CODE 636 W/ 250 OVERRIDE (IP): Performed by: EMERGENCY MEDICINE

## 2019-09-27 PROCEDURE — 83605 ASSAY OF LACTIC ACID: CPT

## 2019-09-27 PROCEDURE — 770006 HCHG ROOM/CARE - MED/SURG/GYN SEMI*

## 2019-09-27 PROCEDURE — 90471 IMMUNIZATION ADMIN: CPT

## 2019-09-27 PROCEDURE — 700105 HCHG RX REV CODE 258: Performed by: EMERGENCY MEDICINE

## 2019-09-27 PROCEDURE — 96376 TX/PRO/DX INJ SAME DRUG ADON: CPT

## 2019-09-27 PROCEDURE — 700111 HCHG RX REV CODE 636 W/ 250 OVERRIDE (IP): Performed by: SURGERY

## 2019-09-27 PROCEDURE — 96375 TX/PRO/DX INJ NEW DRUG ADDON: CPT

## 2019-09-27 PROCEDURE — 80061 LIPID PANEL: CPT

## 2019-09-27 RX ORDER — SODIUM CHLORIDE, SODIUM LACTATE, POTASSIUM CHLORIDE, CALCIUM CHLORIDE 600; 310; 30; 20 MG/100ML; MG/100ML; MG/100ML; MG/100ML
1000 INJECTION, SOLUTION INTRAVENOUS CONTINUOUS
Status: DISCONTINUED | OUTPATIENT
Start: 2019-09-27 | End: 2019-09-28

## 2019-09-27 RX ORDER — ONDANSETRON 4 MG/1
4 TABLET, ORALLY DISINTEGRATING ORAL EVERY 4 HOURS PRN
Status: DISCONTINUED | OUTPATIENT
Start: 2019-09-27 | End: 2019-09-29 | Stop reason: HOSPADM

## 2019-09-27 RX ORDER — IBUPROFEN 600 MG/1
600 TABLET ORAL
Status: DISCONTINUED | OUTPATIENT
Start: 2019-09-27 | End: 2019-09-29 | Stop reason: HOSPADM

## 2019-09-27 RX ORDER — FAMOTIDINE 20 MG/1
20 TABLET, FILM COATED ORAL 2 TIMES DAILY
Status: DISCONTINUED | OUTPATIENT
Start: 2019-09-27 | End: 2019-09-29 | Stop reason: HOSPADM

## 2019-09-27 RX ORDER — LORAZEPAM 2 MG/ML
1 INJECTION INTRAMUSCULAR ONCE
Status: COMPLETED | OUTPATIENT
Start: 2019-09-27 | End: 2019-09-28

## 2019-09-27 RX ORDER — METOCLOPRAMIDE HYDROCHLORIDE 5 MG/ML
10 INJECTION INTRAMUSCULAR; INTRAVENOUS
Status: DISCONTINUED | OUTPATIENT
Start: 2019-09-27 | End: 2019-09-29 | Stop reason: HOSPADM

## 2019-09-27 RX ORDER — ONDANSETRON 2 MG/ML
4 INJECTION INTRAMUSCULAR; INTRAVENOUS
Status: COMPLETED | OUTPATIENT
Start: 2019-09-27 | End: 2019-09-27

## 2019-09-27 RX ORDER — ONDANSETRON 2 MG/ML
4 INJECTION INTRAMUSCULAR; INTRAVENOUS EVERY 4 HOURS PRN
Status: DISCONTINUED | OUTPATIENT
Start: 2019-09-27 | End: 2019-09-29 | Stop reason: HOSPADM

## 2019-09-27 RX ORDER — PROMETHAZINE HYDROCHLORIDE 25 MG/1
25 SUPPOSITORY RECTAL EVERY 6 HOURS PRN
Status: DISCONTINUED | OUTPATIENT
Start: 2019-09-27 | End: 2019-09-29 | Stop reason: HOSPADM

## 2019-09-27 RX ADMIN — SODIUM CHLORIDE, POTASSIUM CHLORIDE, SODIUM LACTATE AND CALCIUM CHLORIDE 1000 ML: 600; 310; 30; 20 INJECTION, SOLUTION INTRAVENOUS at 14:33

## 2019-09-27 RX ADMIN — ONDANSETRON 4 MG: 2 INJECTION INTRAMUSCULAR; INTRAVENOUS at 07:47

## 2019-09-27 RX ADMIN — FAMOTIDINE 20 MG: 10 INJECTION INTRAVENOUS at 09:50

## 2019-09-27 RX ADMIN — SODIUM CHLORIDE, POTASSIUM CHLORIDE, SODIUM LACTATE AND CALCIUM CHLORIDE 1000 ML: 600; 310; 30; 20 INJECTION, SOLUTION INTRAVENOUS at 21:00

## 2019-09-27 RX ADMIN — FAMOTIDINE 20 MG: 10 INJECTION INTRAVENOUS at 18:05

## 2019-09-27 RX ADMIN — INFLUENZA A VIRUS A/BRISBANE/02/2018 IVR-190 (H1N1) ANTIGEN (FORMALDEHYDE INACTIVATED), INFLUENZA A VIRUS A/KANSAS/14/2017 X-327 (H3N2) ANTIGEN (FORMALDEHYDE INACTIVATED), INFLUENZA B VIRUS B/PHUKET/3073/2013 ANTIGEN (FORMALDEHYDE INACTIVATED), AND INFLUENZA B VIRUS B/MARYLAND/15/2016 BX-69A ANTIGEN (FORMALDEHYDE INACTIVATED) 0.5 ML: 15; 15; 15; 15 INJECTION, SUSPENSION INTRAMUSCULAR at 05:50

## 2019-09-27 RX ADMIN — SODIUM CHLORIDE, POTASSIUM CHLORIDE, SODIUM LACTATE AND CALCIUM CHLORIDE 1000 ML: 600; 310; 30; 20 INJECTION, SOLUTION INTRAVENOUS at 01:02

## 2019-09-27 RX ADMIN — SODIUM CHLORIDE, POTASSIUM CHLORIDE, SODIUM LACTATE AND CALCIUM CHLORIDE 1000 ML: 600; 310; 30; 20 INJECTION, SOLUTION INTRAVENOUS at 07:47

## 2019-09-27 ASSESSMENT — ENCOUNTER SYMPTOMS
SHORTNESS OF BREATH: 0
ABDOMINAL PAIN: 0
VOMITING: 0
CHILLS: 0
HEARTBURN: 0
NAUSEA: 0
FEVER: 0

## 2019-09-27 ASSESSMENT — COGNITIVE AND FUNCTIONAL STATUS - GENERAL
SUGGESTED CMS G CODE MODIFIER MOBILITY: CH
MOBILITY SCORE: 24
SUGGESTED CMS G CODE MODIFIER DAILY ACTIVITY: CH
DAILY ACTIVITIY SCORE: 24

## 2019-09-27 ASSESSMENT — LIFESTYLE VARIABLES
TOTAL SCORE: 0
CONSUMPTION TOTAL: NEGATIVE
ON A TYPICAL DAY WHEN YOU DRINK ALCOHOL HOW MANY DRINKS DO YOU HAVE: 0
EVER FELT BAD OR GUILTY ABOUT YOUR DRINKING: NO
TOTAL SCORE: 0
ALCOHOL_USE: NO
EVER HAD A DRINK FIRST THING IN THE MORNING TO STEADY YOUR NERVES TO GET RID OF A HANGOVER: NO
AVERAGE NUMBER OF DAYS PER WEEK YOU HAVE A DRINK CONTAINING ALCOHOL: 0
DOES PATIENT WANT TO STOP DRINKING: NO
HAVE PEOPLE ANNOYED YOU BY CRITICIZING YOUR DRINKING: NO
HAVE YOU EVER FELT YOU SHOULD CUT DOWN ON YOUR DRINKING: NO
EVER_SMOKED: NEVER
TOTAL SCORE: 0
EVER_SMOKED: NEVER
HOW MANY TIMES IN THE PAST YEAR HAVE YOU HAD 5 OR MORE DRINKS IN A DAY: 0

## 2019-09-27 ASSESSMENT — PATIENT HEALTH QUESTIONNAIRE - PHQ9
1. LITTLE INTEREST OR PLEASURE IN DOING THINGS: NOT AT ALL
2. FEELING DOWN, DEPRESSED, IRRITABLE, OR HOPELESS: NOT AT ALL
SUM OF ALL RESPONSES TO PHQ9 QUESTIONS 1 AND 2: 0

## 2019-09-27 NOTE — ED NOTES
ERP at bedside updating pt and family regarding plan of care, including pending admission. Pt and family demonstrated understanding. Will continue to monitor while awaiting admission orders and bed assignment in hospital.

## 2019-09-27 NOTE — ED NOTES
Med Rec Updated and Complete per Pt at bedside  Allergies Reviewed  No PO ABX last 14 days.    Pt denies RX medication at this time.

## 2019-09-27 NOTE — CARE PLAN
Problem: Communication  Goal: The ability to communicate needs accurately and effectively will improve  Outcome: PROGRESSING AS EXPECTED  Note:   Pt updated on POC, all questions answered at this time      Problem: Safety  Goal: Will remain free from falls  Outcome: PROGRESSING AS EXPECTED  Note:   Call light is within reach, treaded socks on, bed in lowest position, personal belongings are within reach, all needs met at this time

## 2019-09-27 NOTE — PROGRESS NOTES
2 RN Skin Check    Patient has 6 approximated prior lap sites to abdomen, no s/s of infection. IV to right AC. No other wounds noted.

## 2019-09-27 NOTE — PROGRESS NOTES
Flu shot information reviewed with patient. Leaflet provided. Flu vaccine given in right deltoid.

## 2019-09-27 NOTE — ED NOTES
Pt brought back to rm RD 9 from triage. Pt able to transfer self to bed, pt in gown, on monitor, family at bedside, call light in reach. Chart up for ERP.

## 2019-09-27 NOTE — DIETARY
NUTRITION SERVICES: BMI - Pt with BMI >40 (=Body mass index is 45.17 kg/m².), morbid obesity. Weight loss counseling not appropriate in acute care setting. RECOMMEND - Referral to outpatient nutrition services for weight management after D/C.

## 2019-09-27 NOTE — ED NOTES
PIV line established. Blood drawn and sent to the lab. Bolus #1, type: NS.  Started at:  1853, per Sepsis Protocol and/or MD order.

## 2019-09-27 NOTE — CARE PLAN
Problem: Communication  Goal: The ability to communicate needs accurately and effectively will improve  Outcome: PROGRESSING AS EXPECTED     Problem: Safety  Goal: Will remain free from injury  Outcome: PROGRESSING AS EXPECTED  Goal: Will remain free from falls  Outcome: PROGRESSING AS EXPECTED     Problem: Bowel/Gastric:  Goal: Normal bowel function is maintained or improved  Outcome: PROGRESSING AS EXPECTED  Goal: Will not experience complications related to bowel motility  Outcome: PROGRESSING AS EXPECTED

## 2019-09-27 NOTE — ED NOTES
PT has increased lipase level compared to previous and elevated WBC. Acuity 2 and changed to main side.

## 2019-09-27 NOTE — ED PROVIDER NOTES
ED Provider Note    Scribed for Donnie Knowles M.D. by Phyllis Solares. 9/26/2019, 6:26 PM.    Primary care provider: Justina Lagos D.N.P.  Means of arrival: Walk in  History obtained from: Patient  History limited by: None     CHIEF COMPLAINT  Chief Complaint   Patient presents with   • Post-Op Complications   • Blurred Vision   • Lightheadedness   • Vomiting       HPI  Kenzie Gray is a 24 y.o. female who presents to the Emergency Department for vomiting onset 08/06/19. The patient had a gastric sleeve surgery done on 08/05/19 by Dr. Ganser and states the nausea and vomiting began the following day. She reports 3-4 episodes of non bilious or bloody emesis. The patient had a cholecystectomy done on 09/10/19. The patient was seen by Dr. Ganser today for a follow up appointment and was told to report to the ED for rehydration and lab check. She denies fever, chills, and dyspnea. The patient additionally endorses blurred vision onset 5 days ago with associated diplopia. She denies photophobia, tinnitus or hearing loss. The patient denies needing any vision correction in the past. She additionally endorses numbness and tingling to thighs onset 2 days ago. The patient denies alcohol or recreational drug use. No known drug allergies      REVIEW OF SYSTEMS  Pertinent positives include nausea, vomiting, blurred vision, diplopia, numbness and tingling to bilateral thighs. Pertinent negatives include fever, chills, photphobia. All other systems negative.    PAST MEDICAL HISTORY   has a past medical history of Allergy, Bronchitis (2017), Depression, Gynecological disorder, Headache(784.0), Heart burn, Menses, irregular, Migraine, Migraines, Snoring, and Urinary incontinence.    SURGICAL HISTORY   has a past surgical history that includes lap, kaylee restrict proc, longitudinal gas* (8/5/2019) and francine by laparoscopy (9/11/2019).    SOCIAL HISTORY  Social History     Tobacco Use   • Smoking status: Never  "Smoker   • Smokeless tobacco: Never Used   Substance Use Topics   • Alcohol use: Not Currently     Comment: every few months   • Drug use: No     Types: Inhaled      Social History     Substance and Sexual Activity   Drug Use No   • Types: Inhaled       FAMILY HISTORY  Family History   Problem Relation Age of Onset   • Alcohol/Drug Mother    • Arthritis Maternal Aunt    • Alcohol/Drug Maternal Aunt    • Alcohol/Drug Paternal Aunt    • Lung Disease Paternal Grandmother    • Diabetes Paternal Grandmother    • Stroke Paternal Grandmother    • Cancer Paternal Grandfather        CURRENT MEDICATIONS  Home Medications     Reviewed by Alla Leyva R.N. (Registered Nurse) on 09/27/19 at 0034  Med List Status: Complete   Medication Last Dose Status   omeprazole (PRILOSEC) 20 MG delayed-release capsule 9/24/2019 Active                ALLERGIES  No Known Allergies    PHYSICAL EXAM  VITAL SIGNS: /102   Pulse (!) 121   Temp 36.8 °C (98.2 °F) (Temporal)   Resp 18   Ht 1.803 m (5' 11\")   Wt (!) 144.7 kg (319 lb 0.1 oz)   LMP 07/10/2019   SpO2 96%   BMI 44.49 kg/m²     Constitutional: Well developed, Well nourished, moderate distress.   HENT: Normocephalic, Atraumatic, Oropharynx moist.   Eyes: Pupils 3 mm. No sclerae icterus, horizontal bilateral nystagmus. Conjunctiva normal, No discharge.   Cardiovascular: Normal heart rate, Normal rhythm, No murmurs, equal pulses.   Pulmonary: Normal breath sounds, No respiratory distress, No wheezing, No rales, No rhonchi.  Chest: No chest wall tenderness or deformity.   Abdomen: Obese. Well healed surgical scars. Soft, No significant tenderness, No masses, no rebound, no guarding.   Back: No CVA tenderness.   Musculoskeletal: No major deformities noted, No tenderness.   Skin: Warm, Dry, No erythema, No rash.   Neurologic: Negative Romberg test. No ataxia. Normal heel to shin test. Normal finger to nose, Normal cranial nerves II-XII, No pronator drift. Equal strength in upper " and lower extremities bilaterally. Alert & oriented x 3, Normal motor function,  No focal deficits noted.   Psychiatric: Affect normal, Judgment normal, Mood normal.       LABS  Results for orders placed or performed during the hospital encounter of 09/26/19   CBC WITH DIFFERENTIAL   Result Value Ref Range    WBC 12.8 (H) 4.8 - 10.8 K/uL    RBC 6.50 (H) 4.20 - 5.40 M/uL    Hemoglobin 17.4 (H) 12.0 - 16.0 g/dL    Hematocrit 53.2 (H) 37.0 - 47.0 %    MCV 81.8 81.4 - 97.8 fL    MCH 26.8 (L) 27.0 - 33.0 pg    MCHC 32.7 (L) 33.6 - 35.0 g/dL    RDW 42.6 35.9 - 50.0 fL    Platelet Count 170 164 - 446 K/uL    MPV 12.7 9.0 - 12.9 fL    Neutrophils-Polys 76.30 (H) 44.00 - 72.00 %    Lymphocytes 14.40 (L) 22.00 - 41.00 %    Monocytes 8.00 0.00 - 13.40 %    Eosinophils 0.40 0.00 - 6.90 %    Basophils 0.40 0.00 - 1.80 %    Immature Granulocytes 0.50 0.00 - 0.90 %    Nucleated RBC 0.00 /100 WBC    Neutrophils (Absolute) 9.80 (H) 2.00 - 7.15 K/uL    Lymphs (Absolute) 1.85 1.00 - 4.80 K/uL    Monos (Absolute) 1.02 (H) 0.00 - 0.85 K/uL    Eos (Absolute) 0.05 0.00 - 0.51 K/uL    Baso (Absolute) 0.05 0.00 - 0.12 K/uL    Immature Granulocytes (abs) 0.06 0.00 - 0.11 K/uL    NRBC (Absolute) 0.00 K/uL   COMP METABOLIC PANEL   Result Value Ref Range    Sodium 137 135 - 145 mmol/L    Potassium 3.3 (L) 3.6 - 5.5 mmol/L    Chloride 98 96 - 112 mmol/L    Co2 19 (L) 20 - 33 mmol/L    Anion Gap 20.0 (H) 0.0 - 11.9    Glucose 104 (H) 65 - 99 mg/dL    Bun 12 8 - 22 mg/dL    Creatinine 0.60 0.50 - 1.40 mg/dL    Calcium 9.8 8.5 - 10.5 mg/dL    AST(SGOT) 45 12 - 45 U/L    ALT(SGPT) 127 (H) 2 - 50 U/L    Alkaline Phosphatase 118 (H) 30 - 99 U/L    Total Bilirubin 1.2 0.1 - 1.5 mg/dL    Albumin 4.3 3.2 - 4.9 g/dL    Total Protein 7.9 6.0 - 8.2 g/dL    Globulin 3.6 (H) 1.9 - 3.5 g/dL    A-G Ratio 1.2 g/dL   LIPASE   Result Value Ref Range    Lipase 521 (H) 11 - 82 U/L   HCG QUAL SERUM   Result Value Ref Range    Beta-Hcg Qualitative Serum Negative  Negative   URINALYSIS,CULTURE IF INDICATED   Result Value Ref Range    Color DK Yellow     Character Clear     Specific Gravity >=1.045 (A) <1.035    Ph 6.5 5.0 - 8.0    Glucose Negative Negative mg/dL    Ketones >=160 Negative mg/dL    Protein 30 (A) Negative mg/dL    Bilirubin Large (A) Negative    Urobilinogen, Urine 1.0 Negative    Nitrite Negative Negative    Leukocyte Esterase Small (A) Negative    Occult Blood Negative Negative    Micro Urine Req Microscopic    ESTIMATED GFR   Result Value Ref Range    GFR If African American >60 >60 mL/min/1.73 m 2    GFR If Non African American >60 >60 mL/min/1.73 m 2   LACTIC ACID   Result Value Ref Range    Lactic Acid 2.3 (H) 0.5 - 2.0 mmol/L   URINE MICROSCOPIC (W/UA)   Result Value Ref Range    WBC 5-10 (A) /hpf    RBC 0-2 /hpf    Epithelial Cells Rare /hpf    Ca Oxalate Crystal Few /hpf   LACTIC ACID   Result Value Ref Range    Lactic Acid 1.4 0.5 - 2.0 mmol/L       All labs reviewed by me.    RADIOLOGY  CT-ABDOMEN-PELVIS WITH   Final Result      No acute abdominal or pelvic findings.      CT-HEAD W/O   Final Result      Normal CT scan of the head without contrast.               INTERPRETING LOCATION:  [address] [place] [address], [facility]        The radiologist's interpretation of all radiological studies have been reviewed by me.    COURSE & MEDICAL DECISION MAKING  Pertinent Labs & Imaging studies reviewed. (See chart for details)    6:26 PM - Patient seen and examined at bedside. Patient will be treated with Zofran 4 mg and Reglan 10 mg.The patient will receive IV fluids for nausea and vomiting. Ordered CT-Abdomen, Lactic acid, Estimated GFR, CBC, CMP, Lipase, HCG Qual Serum, and UA to evaluate her symptoms. The differential diagnoses include but are not limited to: Migraine, medication reaction, intercranial mass, pancreatitis    10:27 PM -  I discussed the patient's case and the above findings with Dr. Ganser (Surgery) who would like to have the patient admitted for  further evaluation.     11:06 PM - The patient was reevaluated at bedside. She states her blurred vision has improved. I informed the patient of the plan for admission. She is understanding and agreeable.     There was a positive response to IV fluids after patient reevaluation.      Medical Decision Making: At this point time I think the patient most likely is having multiple PVCs versus some cardiac irritation causing the arrhythmia.  We will go ahead and admit the patient for IV hydration given her significant vomiting.  She does appear to have some slight dehydration.    DISPOSITION:  Patient will be admitted to Dr. Ganser in guarded condition.       FINAL IMPRESSION  1. Dehydration    2. Intractable vomiting with nausea, unspecified vomiting type    3. Acute pancreatitis without infection or necrosis, unspecified pancreatitis type          I, Phyllis Solares (Scribe), am scribing for, and in the presence of, Donnie Knowles M.D.    Electronically signed by: Phyllis Solares (Scribe), 9/26/2019    IDonnie M.D. personally performed the services described in this documentation, as scribed by Phyllis Solares in my presence, and it is both accurate and complete.  C  The note accurately reflects work and decisions made by me.  Donnie Knowles  9/27/2019  2:47 AM

## 2019-09-27 NOTE — PROGRESS NOTES
Surgical Progress Note    Author: Levy Hoffman Date & Time created: 2019   9:03 AM     Interval Events:  Patient well known to us, presented to clinic yesterday with S/S of pancreatitis (see H&P note, on chart). Referred to [facility] ER. CT negative, but elevated lipase (521), ALT (127), and alk phos (118) noted. Admitted. Patient responded well to IVF and antiemetics. Vision improved after removal of scopolamine patch. Currently, resting comfortably. Denies N/V or abdominal pain at this time.    Review of Systems   Constitutional: Negative for chills and fever.   Respiratory: Negative for shortness of breath.    Cardiovascular: Negative for chest pain.   Gastrointestinal: Negative for abdominal pain, heartburn, nausea and vomiting.   Skin: Negative for itching and rash.     Hemodynamics:  Temp (24hrs), Av.6 °C (97.9 °F), Min:36.6 °C (97.8 °F), Max:36.8 °C (98.2 °F)  Temperature: 36.6 °C (97.8 °F)  Pulse  Av.1  Min: 50  Max: 121   Blood Pressure: 100/61     Respiratory:    Respiration: 16, Pulse Oximetry: 95 %           Neuro:  GCS       Fluids:    Intake/Output Summary (Last 24 hours) at 2019 0903  Last data filed at 2019 0800  Gross per 24 hour   Intake 2250 ml   Output 200 ml   Net 2050 ml     Weight: (!) 144.7 kg (319 lb 0.1 oz)  Current Diet Order   Procedures   • Diet Order Clear Liquid     Physical Exam   Constitutional: She is oriented to person, place, and time. No distress.   Cardiovascular: Normal rate.   Pulmonary/Chest: Effort normal. No respiratory distress.   Abdominal: Soft.   Neurological: She is alert and oriented to person, place, and time.   Skin: Skin is warm and dry.   Psychiatric: She has a normal mood and affect.   Nursing note and vitals reviewed.    Labs:  Recent Results (from the past 24 hour(s))   CBC WITH DIFFERENTIAL    Collection Time: 19  4:41 PM   Result Value Ref Range    WBC 12.8 (H) 4.8 - 10.8 K/uL    RBC 6.50 (H) 4.20 - 5.40 M/uL    Hemoglobin 17.4 (H)  12.0 - 16.0 g/dL    Hematocrit 53.2 (H) 37.0 - 47.0 %    MCV 81.8 81.4 - 97.8 fL    MCH 26.8 (L) 27.0 - 33.0 pg    MCHC 32.7 (L) 33.6 - 35.0 g/dL    RDW 42.6 35.9 - 50.0 fL    Platelet Count 170 164 - 446 K/uL    MPV 12.7 9.0 - 12.9 fL    Neutrophils-Polys 76.30 (H) 44.00 - 72.00 %    Lymphocytes 14.40 (L) 22.00 - 41.00 %    Monocytes 8.00 0.00 - 13.40 %    Eosinophils 0.40 0.00 - 6.90 %    Basophils 0.40 0.00 - 1.80 %    Immature Granulocytes 0.50 0.00 - 0.90 %    Nucleated RBC 0.00 /100 WBC    Neutrophils (Absolute) 9.80 (H) 2.00 - 7.15 K/uL    Lymphs (Absolute) 1.85 1.00 - 4.80 K/uL    Monos (Absolute) 1.02 (H) 0.00 - 0.85 K/uL    Eos (Absolute) 0.05 0.00 - 0.51 K/uL    Baso (Absolute) 0.05 0.00 - 0.12 K/uL    Immature Granulocytes (abs) 0.06 0.00 - 0.11 K/uL    NRBC (Absolute) 0.00 K/uL   COMP METABOLIC PANEL    Collection Time: 09/26/19  4:41 PM   Result Value Ref Range    Sodium 137 135 - 145 mmol/L    Potassium 3.3 (L) 3.6 - 5.5 mmol/L    Chloride 98 96 - 112 mmol/L    Co2 19 (L) 20 - 33 mmol/L    Anion Gap 20.0 (H) 0.0 - 11.9    Glucose 104 (H) 65 - 99 mg/dL    Bun 12 8 - 22 mg/dL    Creatinine 0.60 0.50 - 1.40 mg/dL    Calcium 9.8 8.5 - 10.5 mg/dL    AST(SGOT) 45 12 - 45 U/L    ALT(SGPT) 127 (H) 2 - 50 U/L    Alkaline Phosphatase 118 (H) 30 - 99 U/L    Total Bilirubin 1.2 0.1 - 1.5 mg/dL    Albumin 4.3 3.2 - 4.9 g/dL    Total Protein 7.9 6.0 - 8.2 g/dL    Globulin 3.6 (H) 1.9 - 3.5 g/dL    A-G Ratio 1.2 g/dL   LIPASE    Collection Time: 09/26/19  4:41 PM   Result Value Ref Range    Lipase 521 (H) 11 - 82 U/L   HCG QUAL SERUM    Collection Time: 09/26/19  4:41 PM   Result Value Ref Range    Beta-Hcg Qualitative Serum Negative Negative   ESTIMATED GFR    Collection Time: 09/26/19  4:41 PM   Result Value Ref Range    GFR If African American >60 >60 mL/min/1.73 m 2    GFR If Non African American >60 >60 mL/min/1.73 m 2   LACTIC ACID    Collection Time: 09/26/19  6:55 PM   Result Value Ref Range    Lactic Acid  2.3 (H) 0.5 - 2.0 mmol/L   URINALYSIS,CULTURE IF INDICATED    Collection Time: 09/26/19 10:57 PM   Result Value Ref Range    Color DK Yellow     Character Clear     Specific Gravity >=1.045 (A) <1.035    Ph 6.5 5.0 - 8.0    Glucose Negative Negative mg/dL    Ketones >=160 Negative mg/dL    Protein 30 (A) Negative mg/dL    Bilirubin Large (A) Negative    Urobilinogen, Urine 1.0 Negative    Nitrite Negative Negative    Leukocyte Esterase Small (A) Negative    Occult Blood Negative Negative    Micro Urine Req Microscopic    URINE MICROSCOPIC (W/UA)    Collection Time: 09/26/19 10:57 PM   Result Value Ref Range    WBC 5-10 (A) /hpf    RBC 0-2 /hpf    Epithelial Cells Rare /hpf    Ca Oxalate Crystal Few /hpf   LACTIC ACID    Collection Time: 09/27/19 12:01 AM   Result Value Ref Range    Lactic Acid 1.4 0.5 - 2.0 mmol/L     Medical Decision Making, by Problem:  There are no active hospital problems to display for this patient.    Plan:  Recurrent idiopathic ancreatits:GI consulted. Dr. Gould will see patient, recommends MRCP.Appreciate rec's!  Order in chart for MRCP.  NPO until after MRCP.  IVF  Antiemetics      Quality Measures:  Quality-Core Measures   Reviewed items::  Medications reviewed and Radiology images reviewed  Brandon catheter::  No Brandon  DVT prophylaxis - mechanical:  SCDs  Ulcer Prophylaxis::  Yes      Discussed patient condition with RN, Rehabilitation eval, Patient and Dr. Ganser, Dr. Gould

## 2019-09-27 NOTE — ED NOTES
Pt being taken upstairs at this time by transport tech via [place]. Pt's family accompanying. Pt is awake and alert, talking to staff, in no apparent distress at time of transfer. Pt's paperwork and belongings sent upstairs with RN, pt, and family.

## 2019-09-27 NOTE — ED NOTES
Report received from GANESH Linares. Upon shift change pt is resting in bed talking to ERP at bedside with even and unlabored breaths. No distress noted, will continue to monitor.

## 2019-09-27 NOTE — CONSULTS
DATE OF SERVICE:  09/27/2019    GASTROENTEROLOGY CONSULTATION    CONSULTATION REQUESTED BY:  John H. Ganser, MD    REASON FOR CONSULTATION:  Nausea and vomiting with elevated lipase.    IDENTIFICATION:  A 24-year-old  female.    CHIEF COMPLAINT:  Nausea and vomiting.    HISTORY OF PRESENT ILLNESS:  History was obtained via interview of the   patient, discussion with the surgical admitting team, review of available   records.  This patient has not seen a gastroenterologist before.  She has   never had an endoscopy nor colonoscopy per her report.  The patient does have   a pertinent history of morbid obesity.  She is status post gastric sleeve   bariatric procedure with hiatal hernia repair on 08/05/2019 conducted by Dr. Ganser.  The patient reports that she did well recovering for about a week;   however, then she developed nausea, which has essentially been persistent   since that time.  The nausea has been quite refractory.  She was hospitalized   later in August and found to have an elevated lipase of around 350.  She   underwent ultrasound, which showed possible dilated bile duct; however, MRCP   did not show any abnormality.  She was discharged home.  She was readmitted   around 09/10.  At that time, her lipase was again elevated and at that point,   she underwent cholecystectomy.  There was biliary sludge noted within the   gallbladder and possibly some sludge within the cystic duct at the time of the   surgery per my review of the operative note.  The patient denies any history   of alcohol use.  No known family history of pancreatitis or autoimmune   pancreatitis.  No family history of pancreatic cancer.  The patient's nausea   is not associated with any visual changes or focal numbness, tingling,   weakness, or other neurological symptoms.  She reports that scopolamine patch   is the only thing that has helped her nausea; however, that gave her a very   dry mouth and as such, she refuses to use  it.  Additionally, on further   questioning, intravenous Compazine seems to help the nausea.  She denies   heartburn, regurgitation.  She has had some rare abdominal pain.  At one   point, it was severe, radiating to her back.  However, over the last several   days, she has had no abdominal pain whatsoever.  Upon presentation to office   yesterday, she was sent to the emergency department.  There, she was found to   have an elevated white count.  Today, her white count is 12,800.  Her   hemoglobin is 17.4 with a hematocrit of 53.2.  Potassium is slightly low at   3.3.  Lipase is elevated at 521, alkaline phosphatase 118, AST 45 with ALT   127.    ALLERGIES:  No known drug allergies.    MEDICATIONS:  Only home medication was occasional omeprazole.    PAST MEDICAL HISTORY:  Morbid obesity.    PAST SURGICAL HISTORY:  See the HPI.    SOCIAL HISTORY:  No tobacco or drugs.  Very rare alcohol, none in the last   several months.    FAMILY HISTORY:  Noncontributory, specifically no luminal GI disease, liver   disease, or pancreatic disease.  Her mother did have a cholecystectomy for   symptomatic biliary disease.    REVIEW OF SYSTEMS:  See the HPI.  Otherwise, all systems negative per CMS   criteria.    PHYSICAL EXAMINATION:  GENERAL:  Obese  female, in no immediate distress, friendly,   cooperative, and appropriate.  VITAL SIGNS:  Afebrile, heart rate 57 and regular, respiratory rate 16, blood   pressure 100/71, oxygen saturation 95% on room air.  HEENT:  Normocephalic and atraumatic.  Sclerae are anicteric.  Conjunctivae   pink.  Oropharynx is moist and clear with no visible lesions.  NECK:  No thyroid abnormality or lymphadenopathy.  LUNGS:  Clear to auscultation bilaterally.  CARDIOVASCULAR:  Regular rate and rhythm.  ABDOMEN:  Bowel sounds present, soft, nontender, nondistended.  EXTREMITIES:  No clubbing, cyanosis, or edema.  SKIN:  No jaundice.  NEUROLOGICAL:  Grossly nonfocal.  Alert and oriented  x3.  PSYCHIATRIC:  Affect is appropriate.    LABORATORY DATA:  See the HPI.    IMAGING:  See the HPI.    IMPRESSION:  A 24-year-old female with persistent problematic nausea, status   post gastric sleeve procedure.  It is unclear whether the elevated lipase is   truly indicative of clinical pancreatitis versus biochemical pancreatitis.    She does think she had significant pain during one of her prior   hospitalizations and I do suspect there is a component of clinical   pancreatitis.  Consider possibility such as autoimmune pancreatitis,   hypertriglyceridemia, cystic fibrosis gene mutation abnormality, consider   possibility of unreported alcohol use, consider possibility of perforating   ulcer, but I would suspect she would have evidence of bleeding and pain, if   this were the case.  Again, consider the possibility that her nausea is simply   postsurgical nausea or other source of nausea and that her lipase is not   indicative of clinical pancreatitis.  At this time, we will work up for less   common sources of pancreatitis as well as repeat the MRCP to look for   suggestion of sludge or stone retained within the common bile duct.  If that   is the case, she will require an ERCP.    PROBLEMS LIST:  1.  Nausea and vomiting.  2.  Pancreatitis.  3.  Leukocytosis -- likely a stress reaction.  4.  Lipase elevation.  5.  Abnormal liver panel.  6.  Hypokalemia.  7.  Morbid obesity.    PLAN AND RECOMMENDATIONS:  1.  Labs tomorrow morning to include CMP, CBC, RITO, IgG4, triglyceride level.  2.  Consider outpatient CFTR gene mutation analysis if no source of   pancreatitis found.  3.  Please replete the potassium and check daily labs for now.  4.  Await MRCP results from today.  I had recommended this be ordered by the   surgical team and I see it is pending.  5.  Consider esophagogastroduodenoscopy to rule out other abnormality of the   upper GI tract in this postsurgical patient, if her MRCP and labs were    negative.  6.  Antiemetics as needed.  7.  Clear liquid diet after the MRCP is completed today and advance towards a   low fat soft diet as tolerated.    I will talk with Dr. Turner from our practice about this patient, who will   assume care tomorrow.       ____________________________________     MD CHRISTIAN DOSHI / [place]    DD:  09/27/2019 15:19:00  DT:  09/27/2019 16:43:30    D#:  [id number]  Job#:  [id number]    cc: JOHN GANSER MD, Justina Lagos DNP

## 2019-09-27 NOTE — ED NOTES
Pt laying in bed talking to friends at bedside with even and unlabored breaths. No distress is noted at this time. Pt updated regarding plan of care and demonstrated understanding. Will continue to monitor pt while awaiting test results and further orders.

## 2019-09-27 NOTE — PROGRESS NOTES
Admission questions and plan of care reviewed with patient.  Welcome packet and information provided.  Patient started on IV LR to right AC at 150 mL/hr.  Patient alert and oriented x4.  Patient steady on feet. Denies any blurred vision, denies dizziness.  Pt denies pain, denies nausea.  Call light within reach. Instructed patient to call for assistance before getting out of bed. Patient verbalized acknowledgement.  Patient denies needs at this time. Will follow up.

## 2019-09-27 NOTE — PROGRESS NOTES
Report received from RN, assumed care at 0700  Pt is A0X4, and responds appropriately   Pt declines any SOB, chest pain, new onset of numbness/ tingiling  Pt rates pain at 0 /10, on a scale of 1-10, pt declines pain and pain medication needs at this time   Pt is voiding adequatly and without hesitancy  Pt has + flatus, + bowel sounds,  BM on 9/26/2019 PTA   Pt ambulates with a stand by assist and steady gait  Pt is tolerating a clear liquid diet, pt has mild nausea this AM medicated per MAR   Pt has x6 lap sits to the abdomen, open to air, healed         Plan of care discussed, all questions answered. Explained importance of calling before getting OOB and pt verbalizes understanding. Explained importance of oral care. Call light is within reach, treaded slipper socks on, bed in lowest/ locked position, hourly rounding in place, all needs met at this time

## 2019-09-27 NOTE — ED NOTES
Report called to GANESH Gold. Will continue to monitor pt while awaiting transport tech arrival to take pt upstairs.

## 2019-09-27 NOTE — ED NOTES
Pt resting in bed with even and unlabored breaths, talking to mom and sister at bedside. No distress noted at this time. Will continue to monitor.

## 2019-09-27 NOTE — DISCHARGE PLANNING
Care Transition Team Assessment    Anticipated Discharge Disposition: Home    Action: RN CM assessed pt at bedside. Pt states she lives in a singles tory home with her parents in [place], [place]. Pt is independent with all her ADLs and IADLs and uses no DME at home. She has prescription drug coverage and reports using [facility] in [place]. She anticipates discharging home with no needs when medically cleared.     Barriers to Discharge: Medical clearance pending MRCP and GI consult.    Plan: Await medical clearance for discharge home with no anticipated needs.     Information Source  Orientation : Oriented x 4  Information Given By: Patient  Who is responsible for making decisions for patient? : Patient    Readmission Evaluation  Is this a readmission?: No    Elopement Risk  Legal Hold: No  Ambulatory or Self Mobile in Wheelchair: Yes  Disoriented: No  Psychiatric Symptoms: None  History of Wandering: No  Elopement this Admit: No  Vocalizing Wanting to Leave: No  Displays Behaviors, Body Language Wanting to Leave: No-Not at Risk for Elopement  Elopement Risk: Not at Risk for Elopement    Interdisciplinary Discharge Planning  Does Admitting Nurse Feel This Could be a Complex Discharge?: No  Primary Care Physician: AMPARO Palafox  Lives with - Patient's Self Care Capacity: Parents  Patient or legal guardian wants to designate a caregiver (see row info): No  Support Systems: Family Member(s)  Housing / Facility: 1 Story Apartment / Condo  Do You Take your Prescribed Medications Regularly: Yes  Able to Return to Previous ADL's: Yes  Mobility Issues: No  Prior Services: None  Assistance Needed: No  Durable Medical Equipment: Not Applicable    Discharge Preparedness  What is your plan after discharge?: Home with help  What are your discharge supports?: Parent  Prior Functional Level: Ambulatory, Drives Self, Independent with Activities of Daily Living, Independent with Medication Management  Difficulity with ADLs:  None  Difficulity with IADLs: None    Functional Assesment  Prior Functional Level: Ambulatory, Drives Self, Independent with Activities of Daily Living, Independent with Medication Management    Finances  Financial Barriers to Discharge: No  Prescription Coverage: Yes    Vision / Hearing Impairment  Vision Impairment : No  Right Eye Vision: Other (Comments)(blurred, double vision)  Left Eye Vision: Other (Comments)(blurred, double vision)  Hearing Impairment : No         Advance Directive  Advance Directive?: None    Domestic Abuse  Have you ever been the victim of abuse or violence?: No  Physical Abuse or Sexual Abuse: No  Verbal Abuse or Emotional Abuse: No  Possible Abuse Reported to:: Not Applicable         Discharge Risks or Barriers  Discharge risks or barriers?: No    Anticipated Discharge Information  Anticipated discharge disposition: Home  Discharge Address: [id number] [facility]  Discharge Contact Phone Number: [phone]

## 2019-09-28 ENCOUNTER — APPOINTMENT (OUTPATIENT)
Dept: RADIOLOGY | Facility: MEDICAL CENTER | Age: 24
DRG: 392 | End: 2019-09-28
Attending: PHYSICIAN ASSISTANT
Payer: COMMERCIAL

## 2019-09-28 PROCEDURE — 700111 HCHG RX REV CODE 636 W/ 250 OVERRIDE (IP): Performed by: PHYSICIAN ASSISTANT

## 2019-09-28 PROCEDURE — A9270 NON-COVERED ITEM OR SERVICE: HCPCS | Performed by: PHYSICIAN ASSISTANT

## 2019-09-28 PROCEDURE — 74181 MRI ABDOMEN W/O CONTRAST: CPT

## 2019-09-28 PROCEDURE — 86038 ANTINUCLEAR ANTIBODIES: CPT

## 2019-09-28 PROCEDURE — 96376 TX/PRO/DX INJ SAME DRUG ADON: CPT

## 2019-09-28 PROCEDURE — 770006 HCHG ROOM/CARE - MED/SURG/GYN SEMI*

## 2019-09-28 PROCEDURE — 36415 COLL VENOUS BLD VENIPUNCTURE: CPT

## 2019-09-28 PROCEDURE — 700105 HCHG RX REV CODE 258: Performed by: EMERGENCY MEDICINE

## 2019-09-28 PROCEDURE — 700102 HCHG RX REV CODE 250 W/ 637 OVERRIDE(OP): Performed by: PHYSICIAN ASSISTANT

## 2019-09-28 PROCEDURE — 82784 ASSAY IGA/IGD/IGG/IGM EACH: CPT

## 2019-09-28 RX ADMIN — SODIUM CHLORIDE, POTASSIUM CHLORIDE, SODIUM LACTATE AND CALCIUM CHLORIDE 1000 ML: 600; 310; 30; 20 INJECTION, SOLUTION INTRAVENOUS at 11:32

## 2019-09-28 RX ADMIN — LORAZEPAM 1 MG: 2 INJECTION INTRAMUSCULAR; INTRAVENOUS at 13:51

## 2019-09-28 RX ADMIN — ONDANSETRON 4 MG: 2 INJECTION INTRAMUSCULAR; INTRAVENOUS at 08:48

## 2019-09-28 RX ADMIN — SODIUM CHLORIDE, POTASSIUM CHLORIDE, SODIUM LACTATE AND CALCIUM CHLORIDE 1000 ML: 600; 310; 30; 20 INJECTION, SOLUTION INTRAVENOUS at 05:11

## 2019-09-28 RX ADMIN — FAMOTIDINE 20 MG: 10 INJECTION INTRAVENOUS at 05:11

## 2019-09-28 RX ADMIN — FAMOTIDINE 20 MG: 20 TABLET ORAL at 17:55

## 2019-09-28 RX ADMIN — ONDANSETRON 4 MG: 2 INJECTION INTRAMUSCULAR; INTRAVENOUS at 02:59

## 2019-09-28 ASSESSMENT — ENCOUNTER SYMPTOMS
CONSTITUTIONAL NEGATIVE: 1
GASTROINTESTINAL NEGATIVE: 1
NEUROLOGICAL NEGATIVE: 1
PSYCHIATRIC NEGATIVE: 1
EYES NEGATIVE: 1
CARDIOVASCULAR NEGATIVE: 1
RESPIRATORY NEGATIVE: 1
MUSCULOSKELETAL NEGATIVE: 1

## 2019-09-28 NOTE — PROGRESS NOTES
Gastroenterology Progress Note     Author: Chaitanya Turner   Date & Time Created: 9/28/2019 4:40 PM    Chief Complaint:  History was obtained via interview of the   patient, discussion with the surgical admitting team, review of available   records.  This patient has not seen a gastroenterologist before.  She has   never had an endoscopy nor colonoscopy per her report.  The patient does have   a pertinent history of morbid obesity.  She is status post gastric sleeve   bariatric procedure with hiatal hernia repair on 08/05/2019 conducted by Dr. Ganser.  The patient reports that she did well recovering for about a week;   however, then she developed nausea, which has essentially been persistent   since that time.  The nausea has been quite refractory.  She was hospitalized   later in August and found to have an elevated lipase of around 350.  She   underwent ultrasound, which showed possible dilated bile duct; however, MRCP   did not show any abnormality.  She was discharged home.  She was readmitted   around 09/10.  At that time, her lipase was again elevated and at that point,   she underwent cholecystectomy.  There was biliary sludge noted within the   gallbladder and possibly some sludge within the cystic duct at the time of the   surgery per my review of the operative note.  The patient denies any history   of alcohol use.  No known family history of pancreatitis or autoimmune   pancreatitis.  No family history of pancreatic cancer.  The patient's nausea   is not associated with any visual changes or focal numbness, tingling,   weakness, or other neurological symptoms.  She reports that scopolamine patch   is the only thing that has helped her nausea; however, that gave her a very   dry mouth and as such, she refuses to use it.  Additionally, on further   questioning, intravenous Compazine seems to help the nausea.  She denies   heartburn, regurgitation.  She has had some rare abdominal pain.  At one   point, it  was severe, radiating to her back.  However, over the last several   days, she has had no abdominal pain whatsoever.  Upon presentation to office   yesterday, she was sent to the emergency department.  There, she was found to   have an elevated white count.  Today, her white count is 12,800.  Her   hemoglobin is 17.4 with a hematocrit of 53.2.  Potassium is slightly low at   3.3.  Lipase is elevated at 521, alkaline phosphatase 118, AST 45 with ALT   127.    Interval History:  9/28/2019: MRCP negative. Doing better, less nausea. No abdominal pain. Wants to advance diet    Review of Systems:  Review of Systems   Constitutional: Negative.    HENT: Negative.    Eyes: Negative.    Respiratory: Negative.    Cardiovascular: Negative.    Gastrointestinal: Negative.    Genitourinary: Negative.    Musculoskeletal: Negative.    Skin: Negative.    Neurological: Negative.    Endo/Heme/Allergies: Negative.    Psychiatric/Behavioral: Negative.        Physical Exam:  Physical Exam   Constitutional: She is oriented to person, place, and time. She appears well-developed and well-nourished. No distress.   HENT:   Head: Normocephalic and atraumatic.   Eyes: Pupils are equal, round, and reactive to light. Conjunctivae and EOM are normal. Right eye exhibits no discharge. Left eye exhibits no discharge. No scleral icterus.   Neck: Normal range of motion. Neck supple. No JVD present. No tracheal deviation present. No thyromegaly present.   Cardiovascular: Normal rate and regular rhythm. Exam reveals no friction rub.   No murmur heard.  Pulmonary/Chest: Effort normal and breath sounds normal. No stridor. No respiratory distress. She has no wheezes. She has no rales. She exhibits no tenderness.   Abdominal: Soft. Bowel sounds are normal. She exhibits no distension and no mass. There is no tenderness. There is no rebound and no guarding.   Musculoskeletal: Normal range of motion. She exhibits no edema or tenderness.   Lymphadenopathy:      She has no cervical adenopathy.   Neurological: She is alert and oriented to person, place, and time. She displays normal reflexes. No cranial nerve deficit. Coordination normal.   Skin: Skin is warm and dry. No rash noted. She is not diaphoretic. No erythema. No pallor.       Labs:          Recent Labs     19  1641   SODIUM 137   POTASSIUM 3.3*   CHLORIDE 98   CO2 19*   BUN 12   CREATININE 0.60   CALCIUM 9.8     Recent Labs     19  1641   ALTSGPT 127*   ASTSGOT 45   ALKPHOSPHAT 118*   TBILIRUBIN 1.2   LIPASE 521*   GLUCOSE 104*     Recent Labs     19  1641   RBC 6.50*   HEMOGLOBIN 17.4*   HEMATOCRIT 53.2*   PLATELETCT 170     Recent Labs     19  1641   WBC 12.8*   NEUTSPOLYS 76.30*   LYMPHOCYTES 14.40*   MONOCYTES 8.00   EOSINOPHILS 0.40   BASOPHILS 0.40   ASTSGOT 45   ALTSGPT 127*   ALKPHOSPHAT 118*   TBILIRUBIN 1.2     Hemodynamics:  Temp (24hrs), Av.6 °C (97.8 °F), Min:36.4 °C (97.5 °F), Max:37 °C (98.6 °F)  Temperature: 36.6 °C (97.8 °F)  Pulse  Av.1  Min: 50  Max: 121   Blood Pressure: 118/57     Respiratory:    Respiration: 17, Pulse Oximetry: 92 %           Fluids:    Intake/Output Summary (Last 24 hours) at 2019 1640  Last data filed at 2019 1532  Gross per 24 hour   Intake 3150 ml   Output 1 ml   Net 3149 ml     Weight: (!) 147.7 kg (325 lb 9.9 oz)  GI/Nutrition:  Orders Placed This Encounter   Procedures   • Diet Order Low Fiber(GI Soft)     Standing Status:   Standing     Number of Occurrences:   1     Order Specific Question:   Diet:     Answer:   Low Fiber(GI Soft) [2]     Medical Decision Making, by Problem:  There are no active hospital problems to display for this patient.    [id number]  Narrative: 2019 1:54 PM    HISTORY/REASON FOR EXAM:  Pancreatitis, chronic, follow up; idiopathic recurrent pancreatitis; MRCP.    TECHNIQUE/EXAM DESCRIPTION: Magnetic resonance cholangiopancreatography.    Magnetic resonance cholangiopancreatography was performed  on with axial, coronal, and sagittal thin and thick section heavily T2-weighted sequences.    3-D cholangiographic multiplanar maximum intensity projection (MIP) images were created on a workstation..    The study was performed on a [employer] 1.5 Meagan MRI scanner.    COMPARISON: 9/1/2019    FINDINGS:  There is no intrahepatic biliary ductal dilatation. There is no common bile duct dilatation. There is no filling defect in the common bile duct. There are changes secondary to the previous cholecystectomy. There is no focal lesion in the liver, pancreas,   bilateral renal parenchyma and spleen.  Impression: Unremarkable MRCP.    CT Abdomen 9/26/2019  The bowel, mesentery, and appendix are normal. No ascites or adenopathy.  The liver is unremarkable.  The spleen is unremarkable.  The pancreas is unremarkable.  The gallbladder is surgically absent.  The adrenal glands are normal in size.  The kidneys enhance symmetrically. No hydronephrosis.        CT Pelvis:  There is no acute inflammatory process in the pelvis.      PROBLEMS LIST:  1.  Nausea and vomiting.  2.  Leukocytosis -- likely a stress reaction.  3.  Lipase elevation.  4.  Abnormal liver panel - could be result of rapid weight loss as well.  5.  Hypokalemia.  6.  Morbid obesity.       Plan:  1.  Daily labs  2. Not classic for pancreatitis based on classical definition (2/3 positive needed. CT scan; abdominal pain, elevated amylase/lipase). Symptoms not suggestive of active pancreatitis.   3.  Replete nutrition  4.  Antiemetics as needed.  5.  Advance diet as tolerated and see  6. Discussed with Dr. Ganser, will hold off EGD for now.    Quality-Core Measures

## 2019-09-28 NOTE — PROGRESS NOTES
This RN received call from MRI.  Scheduled MRCP pushed back until 9/28/19, no time given.  Pt updated.

## 2019-09-28 NOTE — PROGRESS NOTES
Bedside report received from day shift RN.  Assessment completed.  Pt AOx4.  Calls appropriately/no assistance required.  Pt is up self.  Pain is well controlled, 0/10.  Will continue to monitor and medicate per MAR.  7x old abdominal lap sites observed.  ALONDRA, no drainage, minor redness noted.  Pt  denies SOB. No O2 required at this time.  Pt is NPO at this time d/t upcoming MRCP, clear liquid diet otherwise which she tolerates well. No complaints of n/v.  +void.  -bowel movement. -flatus.  Bowel sounds present at this time.  POC discussed with the pt.  Belongings and call light within reach.  Bed locked and in low position.  Hourly rounding in place.  All needs met at this time.

## 2019-09-28 NOTE — CARE PLAN
Problem: Communication  Goal: The ability to communicate needs accurately and effectively will improve  Outcome: PROGRESSING AS EXPECTED  Note:   Pt updated on POC, all questions answered at this time      Problem: Safety  Goal: Will remain free from injury  Outcome: PROGRESSING AS EXPECTED  Note:   Call light is within reach, treaded socks on, bed in lowest position, personal belongings are within reach, all needs met at this time

## 2019-09-28 NOTE — CARE PLAN
Problem: Communication  Goal: The ability to communicate needs accurately and effectively will improve  Outcome: PROGRESSING AS EXPECTED  Note:   POC discussed with the pt.  All questions answered at this time.     Problem: Safety  Goal: Will remain free from injury  Outcome: PROGRESSING AS EXPECTED  Note:   Call light and belongings within reach.  Bed locked and in lowest position.  Falls education reinforced.

## 2019-09-29 VITALS
HEIGHT: 71 IN | DIASTOLIC BLOOD PRESSURE: 70 MMHG | HEART RATE: 72 BPM | SYSTOLIC BLOOD PRESSURE: 115 MMHG | OXYGEN SATURATION: 96 % | RESPIRATION RATE: 16 BRPM | WEIGHT: 293 LBS | BODY MASS INDEX: 41.02 KG/M2 | TEMPERATURE: 98.8 F

## 2019-09-29 LAB
ALBUMIN SERPL BCP-MCNC: 3.4 G/DL (ref 3.2–4.9)
ALBUMIN/GLOB SERPL: 1.4 G/DL
ALP SERPL-CCNC: 85 U/L (ref 30–99)
ALT SERPL-CCNC: 90 U/L (ref 2–50)
ANION GAP SERPL CALC-SCNC: 11 MMOL/L (ref 0–11.9)
AST SERPL-CCNC: 32 U/L (ref 12–45)
BASOPHILS # BLD AUTO: 0.5 % (ref 0–1.8)
BASOPHILS # BLD: 0.04 K/UL (ref 0–0.12)
BILIRUB SERPL-MCNC: 1 MG/DL (ref 0.1–1.5)
BUN SERPL-MCNC: 4 MG/DL (ref 8–22)
CALCIUM SERPL-MCNC: 8.8 MG/DL (ref 8.5–10.5)
CHLORIDE SERPL-SCNC: 104 MMOL/L (ref 96–112)
CO2 SERPL-SCNC: 28 MMOL/L (ref 20–33)
CREAT SERPL-MCNC: 0.55 MG/DL (ref 0.5–1.4)
EOSINOPHIL # BLD AUTO: 0.2 K/UL (ref 0–0.51)
EOSINOPHIL NFR BLD: 2.3 % (ref 0–6.9)
ERYTHROCYTE [DISTWIDTH] IN BLOOD BY AUTOMATED COUNT: 45.1 FL (ref 35.9–50)
GLOBULIN SER CALC-MCNC: 2.5 G/DL (ref 1.9–3.5)
GLUCOSE SERPL-MCNC: 75 MG/DL (ref 65–99)
HCT VFR BLD AUTO: 44.9 % (ref 37–47)
HGB BLD-MCNC: 14.8 G/DL (ref 12–16)
IMM GRANULOCYTES # BLD AUTO: 0.03 K/UL (ref 0–0.11)
IMM GRANULOCYTES NFR BLD AUTO: 0.3 % (ref 0–0.9)
LIPASE SERPL-CCNC: 570 U/L (ref 11–82)
LYMPHOCYTES # BLD AUTO: 2.94 K/UL (ref 1–4.8)
LYMPHOCYTES NFR BLD: 33.2 % (ref 22–41)
MCH RBC QN AUTO: 27.9 PG (ref 27–33)
MCHC RBC AUTO-ENTMCNC: 33 G/DL (ref 33.6–35)
MCV RBC AUTO: 84.6 FL (ref 81.4–97.8)
MONOCYTES # BLD AUTO: 0.89 K/UL (ref 0–0.85)
MONOCYTES NFR BLD AUTO: 10 % (ref 0–13.4)
NEUTROPHILS # BLD AUTO: 4.76 K/UL (ref 2–7.15)
NEUTROPHILS NFR BLD: 53.7 % (ref 44–72)
NRBC # BLD AUTO: 0 K/UL
NRBC BLD-RTO: 0 /100 WBC
PLATELET # BLD AUTO: 118 K/UL (ref 164–446)
PMV BLD AUTO: 13 FL (ref 9–12.9)
POTASSIUM SERPL-SCNC: 3.3 MMOL/L (ref 3.6–5.5)
PROT SERPL-MCNC: 5.9 G/DL (ref 6–8.2)
RBC # BLD AUTO: 5.31 M/UL (ref 4.2–5.4)
SODIUM SERPL-SCNC: 143 MMOL/L (ref 135–145)
WBC # BLD AUTO: 8.9 K/UL (ref 4.8–10.8)

## 2019-09-29 PROCEDURE — 83690 ASSAY OF LIPASE: CPT

## 2019-09-29 PROCEDURE — 700102 HCHG RX REV CODE 250 W/ 637 OVERRIDE(OP): Performed by: PHYSICIAN ASSISTANT

## 2019-09-29 PROCEDURE — 36415 COLL VENOUS BLD VENIPUNCTURE: CPT

## 2019-09-29 PROCEDURE — 85025 COMPLETE CBC W/AUTO DIFF WBC: CPT

## 2019-09-29 PROCEDURE — A9270 NON-COVERED ITEM OR SERVICE: HCPCS | Performed by: PHYSICIAN ASSISTANT

## 2019-09-29 PROCEDURE — 80053 COMPREHEN METABOLIC PANEL: CPT

## 2019-09-29 RX ORDER — PROMETHAZINE HYDROCHLORIDE 25 MG/1
25 TABLET ORAL EVERY 6 HOURS PRN
Qty: 30 TAB | Refills: 0 | Status: SHIPPED | OUTPATIENT
Start: 2019-09-29 | End: 2022-05-28

## 2019-09-29 RX ADMIN — FAMOTIDINE 20 MG: 20 TABLET ORAL at 04:36

## 2019-09-29 ASSESSMENT — ENCOUNTER SYMPTOMS
EYES NEGATIVE: 1
MUSCULOSKELETAL NEGATIVE: 1
GASTROINTESTINAL NEGATIVE: 1
RESPIRATORY NEGATIVE: 1
CONSTITUTIONAL NEGATIVE: 1
PSYCHIATRIC NEGATIVE: 1
NEUROLOGICAL NEGATIVE: 1
CARDIOVASCULAR NEGATIVE: 1

## 2019-09-29 NOTE — PROGRESS NOTES
Gastroenterology Progress Note     Author: Chaitanya Turner   Date & Time Created: 9/29/2019 8:42 AM    Chief Complaint:  History was obtained via interview of the   patient, discussion with the surgical admitting team, review of available   records.  This patient has not seen a gastroenterologist before.  She has   never had an endoscopy nor colonoscopy per her report.  The patient does have   a pertinent history of morbid obesity.  She is status post gastric sleeve   bariatric procedure with hiatal hernia repair on 08/05/2019 conducted by Dr. Ganser.  The patient reports that she did well recovering for about a week;   however, then she developed nausea, which has essentially been persistent   since that time.  The nausea has been quite refractory.  She was hospitalized   later in August and found to have an elevated lipase of around 350.  She   underwent ultrasound, which showed possible dilated bile duct; however, MRCP   did not show any abnormality.  She was discharged home.  She was readmitted   around 09/10.  At that time, her lipase was again elevated and at that point,   she underwent cholecystectomy.  There was biliary sludge noted within the   gallbladder and possibly some sludge within the cystic duct at the time of the   surgery per my review of the operative note.  The patient denies any history   of alcohol use.  No known family history of pancreatitis or autoimmune   pancreatitis.  No family history of pancreatic cancer.  The patient's nausea   is not associated with any visual changes or focal numbness, tingling,   weakness, or other neurological symptoms.  She reports that scopolamine patch   is the only thing that has helped her nausea; however, that gave her a very   dry mouth and as such, she refuses to use it.  Additionally, on further   questioning, intravenous Compazine seems to help the nausea.  She denies   heartburn, regurgitation.  She has had some rare abdominal pain.  At one   point, it  was severe, radiating to her back.  However, over the last several   days, she has had no abdominal pain whatsoever.  Upon presentation to office   yesterday, she was sent to the emergency department.  There, she was found to   have an elevated white count.  Today, her white count is 12,800.  Her   hemoglobin is 17.4 with a hematocrit of 53.2.  Potassium is slightly low at   3.3.  Lipase is elevated at 521, alkaline phosphatase 118, AST 45 with ALT   127.    Interval History:  9/28/2019: MRCP negative. Doing better, less nausea. No abdominal pain. Wants to advance diet  9/29/2019: Tolerated diet. No nausea, no vomiting, no pain. Ate regular food this AM.    Review of Systems:  Review of Systems   Constitutional: Negative.    HENT: Negative.    Eyes: Negative.    Respiratory: Negative.    Cardiovascular: Negative.    Gastrointestinal: Negative.    Genitourinary: Negative.    Musculoskeletal: Negative.    Skin: Negative.    Neurological: Negative.    Endo/Heme/Allergies: Negative.    Psychiatric/Behavioral: Negative.        Physical Exam:  Physical Exam   Constitutional: She is oriented to person, place, and time. She appears well-developed and well-nourished. No distress.   HENT:   Head: Normocephalic and atraumatic.   Eyes: Pupils are equal, round, and reactive to light. Conjunctivae and EOM are normal. Right eye exhibits no discharge. Left eye exhibits no discharge. No scleral icterus.   Neck: Normal range of motion. Neck supple. No JVD present. No tracheal deviation present. No thyromegaly present.   Cardiovascular: Normal rate and regular rhythm. Exam reveals no friction rub.   No murmur heard.  Pulmonary/Chest: Effort normal and breath sounds normal. No stridor. No respiratory distress. She has no wheezes. She has no rales. She exhibits no tenderness.   Abdominal: Soft. Bowel sounds are normal. She exhibits no distension and no mass. There is no tenderness. There is no rebound and no guarding.   Musculoskeletal:  Normal range of motion. She exhibits no edema or tenderness.   Lymphadenopathy:     She has no cervical adenopathy.   Neurological: She is alert and oriented to person, place, and time. She displays normal reflexes. No cranial nerve deficit. Coordination normal.   Skin: Skin is warm and dry. No rash noted. She is not diaphoretic. No erythema. No pallor.       Labs:          Recent Labs     19  004   SODIUM 137 143   POTASSIUM 3.3* 3.3*   CHLORIDE 98 104   CO2 19* 28   BUN 12 4*   CREATININE 0.60 0.55   CALCIUM 9.8 8.8     Recent Labs     19  0047   ALTSGPT 127* 90*   ASTSGOT 45 32   ALKPHOSPHAT 118* 85   TBILIRUBIN 1.2 1.0   LIPASE 521* 570*   GLUCOSE 104* 75     Recent Labs     19  004   RBC 6.50* 5.31   HEMOGLOBIN 17.4* 14.8   HEMATOCRIT 53.2* 44.9   PLATELETCT 170 118*     Recent Labs     19  0047   WBC 12.8* 8.9   NEUTSPOLYS 76.30* 53.70   LYMPHOCYTES 14.40* 33.20   MONOCYTES 8.00 10.00   EOSINOPHILS 0.40 2.30   BASOPHILS 0.40 0.50   ASTSGOT 45 32   ALTSGPT 127* 90*   ALKPHOSPHAT 118* 85   TBILIRUBIN 1.2 1.0     Hemodynamics:  Temp (24hrs), Av.9 °C (98.5 °F), Min:36.6 °C (97.8 °F), Max:37.1 °C (98.8 °F)  Temperature: 37.1 °C (98.8 °F)  Pulse  Av.6  Min: 50  Max: 121   Blood Pressure: 115/70     Respiratory:    Respiration: 16, Pulse Oximetry: 96 %           Fluids:    Intake/Output Summary (Last 24 hours) at 2019 1640  Last data filed at 2019 1532  Gross per 24 hour   Intake 3150 ml   Output 1 ml   Net 3149 ml     Weight: (!) 147.7 kg (325 lb 9.9 oz)  GI/Nutrition:  Orders Placed This Encounter   Procedures   • Diet Order Low Fiber(GI Soft)     Standing Status:   Standing     Number of Occurrences:   1     Order Specific Question:   Diet:     Answer:   Low Fiber(GI Soft) [2]     Medical Decision Making, by Problem:  There are no active hospital problems to display for this  patient.    [id number]  Narrative: 9/28/2019 1:54 PM    HISTORY/REASON FOR EXAM:  Pancreatitis, chronic, follow up; idiopathic recurrent pancreatitis; MRCP.    TECHNIQUE/EXAM DESCRIPTION: Magnetic resonance cholangiopancreatography.    Magnetic resonance cholangiopancreatography was performed on with axial, coronal, and sagittal thin and thick section heavily T2-weighted sequences.    3-D cholangiographic multiplanar maximum intensity projection (MIP) images were created on a workstation..    The study was performed on a [employer] 1.5 Meagan MRI scanner.    COMPARISON: 9/1/2019    FINDINGS:  There is no intrahepatic biliary ductal dilatation. There is no common bile duct dilatation. There is no filling defect in the common bile duct. There are changes secondary to the previous cholecystectomy. There is no focal lesion in the liver, pancreas,   bilateral renal parenchyma and spleen.  Impression: Unremarkable MRCP.    CT Abdomen 9/26/2019  The bowel, mesentery, and appendix are normal. No ascites or adenopathy.  The liver is unremarkable.  The spleen is unremarkable.  The pancreas is unremarkable.  The gallbladder is surgically absent.  The adrenal glands are normal in size.  The kidneys enhance symmetrically. No hydronephrosis.        CT Pelvis:  There is no acute inflammatory process in the pelvis.      PROBLEMS LIST:  1.  Nausea and vomiting -resolved  2.  Leukocytosis -- likely a stress reaction.  3.  Lipase elevation.  4.  Abnormal liver panel - could be result of rapid weight loss as well.  5.  Hypokalemia.  6.  Morbid obesity.       Plan:  1. Discharge planning as per team  2. Follow-up as needed with Gastroenterology if recurrence of elevated lipase; symptoms not suggestive of pancreatitis; could be isolated lipase elevation  3. Trend LFT as outpatient.    GI TO SIGN OFF / STAND BY - Thank you very much for allowing me to participate in the care of your patient.  Please feel free to contact me anytime at  [phone]      Quality-Core Measures

## 2019-09-29 NOTE — PROGRESS NOTES
Report received from NOC shift RN.   A/O X 4.   Room air.   VSS.     Labs reviewed.   K+ down to 3.3.  Bun down to 4.   ALT down to 90.   Total protein down to 5.9.   Lipase up to 570.     Pt denies abdominal pain.   BS normoactive X 4.  -BM this am. +flatus. +void.     Pt PIV SL.   Up self.     Call light at bedside.

## 2019-09-29 NOTE — CARE PLAN
Problem: Safety  Goal: Will remain free from falls  Note:   Patient uses call light appropriately. Bed locked and in lowest position. Call light within reach.

## 2019-09-29 NOTE — PROGRESS NOTES
Bedside report received.  Assessment complete.  A&O x 4. Patient calls appropriately.  Patient up self steady gait.  Patient denies pain at this time.  Denies N&V. Tolerating diet.  + void, + flatus  Patient denies SOB.    Review plan with of care with patient. Call light and personal belongings with in reach. Hourly rounding in place. All needs met at this time.

## 2019-09-29 NOTE — DISCHARGE SUMMARY
Discharge Summary    CHIEF COMPLAINT ON ADMISSION  Chief Complaint   Patient presents with   • Post-Op Complications   • Blurred Vision   • Lightheadedness   • Vomiting       Reason for Admission  Sent by MD; Nausea; Post OP compli*     Admission Date  9/26/2019    CODE STATUS  Full Code    HPI & HOSPITAL COURSE  This is a 24 y.o. female here with pancreatitis status post sleeve gastrectomy 2 months ago.  Additionally patient had cholecystectomy 3 weeks ago.  MRCP during this inpatient stay does not demonstrate any ductal dilation or retained choledocholithiasis no need for further intervention by GI at this time.  Patient is tolerating p.o. pain has resolved and is desiring discharge.       Therefore, she is discharged in good and stable condition to home with close outpatient follow-up.    The patient met 2-midnight criteria for an inpatient stay at the time of discharge.    Discharge Date  9/29/2019      FOLLOW UP ITEMS POST DISCHARGE  none    DISCHARGE DIAGNOSES  Active Problems:    * No active hospital problems. *  Resolved Problems:    * No resolved hospital problems. *      FOLLOW UP  No future appointments.  John H Ganser, M.D.  [address] [address]  [phone]    Schedule an appointment as soon as possible for a visit in 1 week  or attend previously scheduled appointment      MEDICATIONS ON DISCHARGE     Medication List      START taking these medications      Instructions   lansoprazole 30 MG Tbdp  Commonly known as:  PREVACID   Take 1 Tab by mouth every day.  Dose:  30 mg     promethazine 25 MG Tabs  Commonly known as:  PHENERGAN   Take 1 Tab by mouth every 6 hours as needed for Nausea/Vomiting.  Dose:  25 mg        STOP taking these medications    PRILOSEC 20 MG delayed-release capsule  Generic drug:  omeprazole            Allergies  No Known Allergies    DIET  Orders Placed This Encounter   Procedures   • Diet NPO     Standing Status:   Standing     Number of Occurrences:   1      Order Specific Question:   Restrict to:     Answer:   Sips with Medications [3]       ACTIVITY  As tolerated.  Weight bearing as tolerated    CONSULTATIONS  GI - given negative MRCP no plan for ERCP at this time    PROCEDURES  none    LABORATORY  Lab Results   Component Value Date    SODIUM 143 09/29/2019    POTASSIUM 3.3 (L) 09/29/2019    CHLORIDE 104 09/29/2019    CO2 28 09/29/2019    GLUCOSE 75 09/29/2019    BUN 4 (L) 09/29/2019    CREATININE 0.55 09/29/2019        Lab Results   Component Value Date    WBC 8.9 09/29/2019    HEMOGLOBIN 14.8 09/29/2019    HEMATOCRIT 44.9 09/29/2019    PLATELETCT 118 (L) 09/29/2019        Total time of the discharge process exceeds 30 minutes.

## 2019-09-29 NOTE — PROGRESS NOTES
"S: Kenzie Gray  is a 24 y.o.  female admitted for 22-year-old with acute pancreatitits status post sleeve gastrectomy 2 months ago and lap francine 3 weeks ago.  Pain is resolved.      O:  /70   Pulse 72   Temp 37.1 °C (98.8 °F) (Temporal)   Resp 16   Ht 1.803 m (5' 11\")   Wt (!) 147.7 kg (325 lb 9.9 oz)   SpO2 96%   Intake/Output       09/27/19 0700 - 09/28/19 0659 09/28/19 0700 - 09/29/19 0659 09/29/19 0700 - 09/30/19 0659      6491-9991 4220-9541 Total 7879-2899 2596-7095 Total 7274-6577 8847-6695 Total       Intake    P.O.  200  0 200  150  240 390  --  -- --    P.O. 200 0 200 150 240 390 -- -- --    I.V.  1800  1800 3600  1800  -- 1800  --  -- --    Volume (mL) (LR infusion (continuous)) 1800 1800 3600 1800 -- 1800 -- -- --    Total Intake 2000 1800 3800 2039 504 7630 -- -- --       Output    Urine  601  -- 601  --  -- --  --  -- --    Number of Times Voided 1 x 3 x 4 x 4 x 2 x 6 x -- -- --    Urine Void (mL) 601 -- 601 -- -- -- -- -- --    Stool  --  -- --  --  -- --  --  -- --    Number of Times Stooled 0 x 0 x 0 x -- -- -- -- -- --    Total Output 601 -- 601 -- -- -- -- -- --       Net I/O     1399 1800 3199 6688 705 9993 -- -- --        Recent Labs     09/26/19 1641 09/29/19 0047   SODIUM 137 143   POTASSIUM 3.3* 3.3*   CHLORIDE 98 104   CO2 19* 28   GLUCOSE 104* 75   BUN 12 4*   CREATININE 0.60 0.55   CALCIUM 9.8 8.8     Recent Labs     09/26/19 1641 09/29/19 0047   WBC 12.8* 8.9   RBC 6.50* 5.31   HEMOGLOBIN 17.4* 14.8   HEMATOCRIT 53.2* 44.9   MCV 81.8 84.6   MCH 26.8* 27.9   MCHC 32.7* 33.0*   RDW 42.6 45.1   PLATELETCT 170 118*   MPV 12.7 13.0*       Alert and Oriented x3, No Acute Distress  Normal Respiratory Effort  Abdomen soft, appropriately tender  Extremities warm and well perfused    A/P:  22-year-old with pancreatitis status post sleeve gastrectomy 2 months ago lap francine 3 weeks ago.  Pain is resolved abdominal exam is benign will trial p.o. this a.m. and discharge pending " tolerance.  Patient was some nausea with omeprazole will change to lansoprazole, will add Phenergan for nausea is unable to tolerate Zofran.    Jaya Sawant MD  [facility]

## 2019-09-29 NOTE — DISCHARGE INSTRUCTIONS
Discharge Instructions    Discharged to home by car with relative. Discharged via wheelchair, hospital escort: Yes.  Special equipment needed: Not Applicable    Be sure to schedule a follow-up appointment with your primary care doctor or any specialists as instructed.     Discharge Plan:   Diet Plan: Discussed  Activity Level: Discussed  Confirmed Follow up Appointment: Patient to Call and Schedule Appointment  Confirmed Symptoms Management: Discussed  Influenza Vaccine Indication: Indicated: 9 to 64 years of age  Influenza Vaccine Given - only chart on this line when given: Influenza Vaccine Given (See MAR)    I understand that a diet low in cholesterol, fat, and sodium is recommended for good health. Unless I have been given specific instructions below for another diet, I accept this instruction as my diet prescription.   Other diet: GI Soft     Special Instructions: None    · Is patient discharged on Warfarin / Coumadin?   No       Laparoscopic Cholecystectomy, Care After  These instructions give you information on caring for yourself after your procedure. Your doctor may also give you more specific instructions. Call your doctor if you have any problems or questions after your procedure.   HOME CARE  · Change your bandages (dressings) as told by your doctor.  · Keep the wound dry and clean. Wash the wound gently with soap and water. Pat the wound dry with a clean towel.  · Do not take baths, swim, or use hot tubs for 2 weeks, or as told by your doctor.  · Only take medicine as told by your doctor.  · Eat a normal diet as told by your doctor.  · Do not lift anything heavier than 10 pounds (4.5 kg) until your doctor says it is okay.  · Do not play contact sports for 1 week, or as told by your doctor.  GET HELP IF:  · Your wound is red, puffy (swollen), or painful.  · You have yellowish-white fluid (pus) coming from the wound.  · You have fluid draining from the wound for more than 1 day.  · You have a bad smell  coming from the wound.  · Your wound breaks open.  GET HELP RIGHT AWAY IF:   · You have a rash.  · You have trouble breathing.  · You have chest pain.  · You have a fever.  · You have pain in the shoulders (shoulder strap areas) that is getting worse.  · You feel dizzy or pass out (faint).  · You have severe belly (abdominal) pain.  · You feel sick to your stomach (nauseous) or throw up (vomit) for more than 1 day.     This information is not intended to replace advice given to you by your health care provider. Make sure you discuss any questions you have with your health care provider.     Document Released: 09/26/2009 Document Revised: 10/08/2014 Document Reviewed: 07/30/2014  [employer] Interactive Patient Education ©2016 [employer]    Magnetic Resonance Cholangiopancreatogram  Introduction  Magnetic resonance imaging (MRI) is a type of procedure that is used to produce pictures of the inside of the body without using X-rays. Instead, strong magnets and radio waves work together in a magnetic field to form very detailed and sharp images. The images are viewed on a TV monitor in two-dimensional and three-dimensional form. The magnets and the radio waves are harmless.  Magnetic resonance cholangiopancreatogram, or magnetic resonance cholangiopancreatography (MRCP), is an MRI that is done on your gallbladder, bile duct, pancreas, and pancreatic duct. MRCP produces detailed images of these organs. It can be used to help diagnose problems such as tumors, stones, infection, or inflammation. It is sometimes used to help determine the cause of pancreatitis or belly (abdominal) pain. Contrast material may be injected to make MRCP images even more clear.  Tell a health care provider about:  · Any allergies you have.  · All medicines you are taking, including vitamins, herbs, eye drops, creams, and over-the-counter medicines.  · Any surgeries you have had.  · Medical conditions you have, including kidney disease.  · Any  metal you may have in your body. The magnet used in this procedure can cause metal objects in your body to move. Metal can also make it hard to get high-quality images. Objects that contain metal include:  ¨ A pacemaker or any other implants, such as an implanted neurostimulator, a metallic ear implant, or a metallic object within the eye socket.  ¨ Metal splinters.  ¨ Any bullet fragments.  ¨ A port for delivering insulin or chemotherapy.  · Any tattoos you have. Some red dyes contain iron, which is sometimes a problem.  · If you are pregnant or you think that you may be pregnant. It is best to avoid this test during the first 3 months of pregnancy unless there is a significant risk of missing a serious diagnosis without performing the test.  · If you are breastfeeding.  · If you are afraid of cramped spaces (claustrophobic). If claustrophobia is a problem for you, it can usually be relieved with mild sedatives or antianxiety medicines.  What happens before the procedure?  · You will be asked to remove anything that contains metal, such as a watch or any jewelry that you are wearing. You may also be asked to remove any makeup because some makeup contains traces of metal. Braces and fillings are usually not a problem.  · Women who are breastfeeding may need to pump breast milk before the exam so that they have milk to give to their baby until the contrast material (if used) has cleared from their body.  What happens during the procedure?  · You may be given earplugs because the machine that is used can be noisy. Headphones may also be available so you can listen to music.  · If a contrast material will be used, an IV tube will be inserted into one of your veins. The contrast material will be injected through the tube.  · You will lie down on a platform.  · The platform will slide into a long, magnetic chamber. When you are inside the chamber, you will still be able to talk to the health care provider.  · You will be  asked to lie very still. The health care provider will tell you when you can shift position. You may have to wait a few minutes to make sure that the images produced during the procedure are readable.  The procedure may vary among health care providers and hospitals.  What happens after the procedure?  · Return to your normal activities as directed by your health care provider.  · If contrast material was used, it will pass from your body within a day.  · A health care provider who is experienced in MRCP will analyze the results and send a report and an interpretation of the findings to your health care provider.  · It is your responsibility to obtain your test results. Ask your health care provider or the department performing the test when and how you will get your results.  This information is not intended to replace advice given to you by your health care provider. Make sure you discuss any questions you have with your health care provider.  Document Released: 06/05/2009 Document Revised: 05/25/2017 Document Reviewed: 09/29/2015  © 2017 [employer]    Depression / Suicide Risk    As you are discharged from this [facility] facility, it is important to learn how to keep safe from harming yourself.    Recognize the warning signs:  · Abrupt changes in personality, positive or negative- including increase in energy   · Giving away possessions  · Change in eating patterns- significant weight changes-  positive or negative  · Change in sleeping patterns- unable to sleep or sleeping all the time   · Unwillingness or inability to communicate  · Depression  · Unusual sadness, discouragement and loneliness  · Talk of wanting to die  · Neglect of personal appearance   · Rebelliousness- reckless behavior  · Withdrawal from people/activities they love  · Confusion- inability to concentrate     If you or a loved one observes any of these behaviors or has concerns about self-harm, here's what you can do:  · Talk about it- your  feelings and reasons for harming yourself  · Remove any means that you might use to hurt yourself (examples: pills, rope, extension cords, firearm)  · Get professional help from the community (Mental Health, Substance Abuse, psychological counseling)  · Do not be alone:Call your Safe Contact- someone whom you trust who will be there for you.  · Call your local CRISIS HOTLINE 325-6164 or 125-657-8002  · Call your local Children's Mobile Crisis Response Team [place] (920) 507-4853 or www.[employer]  · Call the toll free National Suicide Prevention Hotlines   · National Suicide Prevention Lifeline 593-833-SNYM (6578)  · National Hope Line Network 800-SUICIDE (743-8857)

## 2019-09-29 NOTE — PROGRESS NOTES
Discharge and follow up instructions discussed with patient.   PIV removed.   Pt chose to ambulate from unit.

## 2019-09-29 NOTE — CARE PLAN
Problem: Bowel/Gastric:  Goal: Normal bowel function is maintained or improved  Outcome: PROGRESSING AS EXPECTED  Intervention: Educate patient and significant other/support system about diet, fluid intake, medications and activity to promote bowel function  Note:   Pt tolerating GI soft diet. Normal BS X 4 and no reports of N&V.      Problem: Discharge Barriers/Planning  Goal: Patient's continuum of care needs will be met  Outcome: PROGRESSING AS EXPECTED  Intervention: Explain discharge instructions and medication reconcilliation to patient and significant other/support system  Note:   Sx decided pt did not require ERCP during this admission.

## 2019-09-30 LAB
IGA SERPL-MCNC: 242 MG/DL (ref 68–408)
NUCLEAR IGG SER QL IA: NORMAL

## 2021-08-12 NOTE — OR SURGEON
Immediate Post OP Note    PreOp Diagnosis: Cholecystitis, pancreatitis    PostOp Diagnosis: Same    Procedure(s):  CHOLECYSTECTOMY, LAPAROSCOPIC - Wound Class: Clean Contaminated    Surgeon(s):  John H Ganser, M.D.    Anesthesiologist/Type of Anesthesia:  Anesthesiologist: Colten Carbajal M.D./General    Surgical Staff:  Circulator: Ninoska Turpin R.N.  Scrub Person: Tayler Saez Assist: VANESSA Menard    Specimens removed if any:  ID Type Source Tests Collected by Time Destination   A : gallbladder Tissue Gallbladder PATHOLOGY SPECIMEN John H Ganser, M.D. 9/11/2019  7:08 PM        Estimated Blood Loss: 0    Findings: Sludge, distended gallbladder    Complications: 0        9/11/2019 7:23 PM John H Ganser, M.D.    
days.  Your referring physician or the Nurse Navigator will call you with results. [facility] Information:   Should you experience any significant changes in your health or have questions about your care, please contact:  Leda Herrera or Erasmo Amezquita, Breast Navigators with [facility]  [phone]  Monday-Friday. If you need help with your care outside these hours and cannot wait until we are again available, contact your Physician or go to the hospital emergency room.         Electronically signed by Leda Herrera RN on 8/12/2021 at 11:57 AM

## 2022-05-28 ENCOUNTER — ANESTHESIA EVENT (OUTPATIENT)
Dept: SURGERY | Facility: MEDICAL CENTER | Age: 27
End: 2022-05-28
Payer: COMMERCIAL

## 2022-05-28 ENCOUNTER — APPOINTMENT (OUTPATIENT)
Dept: RADIOLOGY | Facility: MEDICAL CENTER | Age: 27
End: 2022-05-28
Attending: EMERGENCY MEDICINE
Payer: COMMERCIAL

## 2022-05-28 ENCOUNTER — HOSPITAL ENCOUNTER (OUTPATIENT)
Facility: MEDICAL CENTER | Age: 27
End: 2022-05-29
Attending: EMERGENCY MEDICINE | Admitting: OBSTETRICS & GYNECOLOGY
Payer: COMMERCIAL

## 2022-05-28 ENCOUNTER — ANESTHESIA (OUTPATIENT)
Dept: SURGERY | Facility: MEDICAL CENTER | Age: 27
End: 2022-05-28
Payer: COMMERCIAL

## 2022-05-28 DIAGNOSIS — O00.101 RIGHT TUBAL PREGNANCY WITHOUT INTRAUTERINE PREGNANCY: ICD-10-CM

## 2022-05-28 DIAGNOSIS — G89.18 POSTOPERATIVE PAIN: Primary | ICD-10-CM

## 2022-05-28 PROBLEM — O00.90 ECTOPIC PREGNANCY WITHOUT INTRAUTERINE PREGNANCY: Status: ACTIVE | Noted: 2022-05-28

## 2022-05-28 LAB
ABO GROUP BLD: NORMAL
ALBUMIN SERPL BCP-MCNC: 4.2 G/DL (ref 3.2–4.9)
ALBUMIN/GLOB SERPL: 1.4 G/DL
ALP SERPL-CCNC: 82 U/L (ref 30–99)
ALT SERPL-CCNC: 12 U/L (ref 2–50)
ANION GAP SERPL CALC-SCNC: 10 MMOL/L (ref 7–16)
APPEARANCE UR: ABNORMAL
AST SERPL-CCNC: 14 U/L (ref 12–45)
B-HCG SERPL-ACNC: 1176 MIU/ML (ref 0–5)
BACTERIA #/AREA URNS HPF: ABNORMAL /HPF
BASOPHILS # BLD AUTO: 0.6 % (ref 0–1.8)
BASOPHILS # BLD: 0.05 K/UL (ref 0–0.12)
BILIRUB SERPL-MCNC: 0.5 MG/DL (ref 0.1–1.5)
BILIRUB UR QL STRIP.AUTO: NEGATIVE
BLD GP AB SCN SERPL QL: NORMAL
BUN SERPL-MCNC: 15 MG/DL (ref 8–22)
CALCIUM SERPL-MCNC: 9.1 MG/DL (ref 8.5–10.5)
CHLORIDE SERPL-SCNC: 107 MMOL/L (ref 96–112)
CO2 SERPL-SCNC: 22 MMOL/L (ref 20–33)
COLOR UR: ABNORMAL
CREAT SERPL-MCNC: 0.53 MG/DL (ref 0.5–1.4)
EOSINOPHIL # BLD AUTO: 0.17 K/UL (ref 0–0.51)
EOSINOPHIL NFR BLD: 2.1 % (ref 0–6.9)
EPI CELLS #/AREA URNS HPF: ABNORMAL /HPF
ERYTHROCYTE [DISTWIDTH] IN BLOOD BY AUTOMATED COUNT: 39.7 FL (ref 35.9–50)
GFR SERPLBLD CREATININE-BSD FMLA CKD-EPI: 132 ML/MIN/1.73 M 2
GLOBULIN SER CALC-MCNC: 3.1 G/DL (ref 1.9–3.5)
GLUCOSE SERPL-MCNC: 114 MG/DL (ref 65–99)
GLUCOSE UR STRIP.AUTO-MCNC: NEGATIVE MG/DL
HCT VFR BLD AUTO: 44.9 % (ref 37–47)
HGB BLD-MCNC: 15 G/DL (ref 12–16)
HYALINE CASTS #/AREA URNS LPF: ABNORMAL /LPF
IMM GRANULOCYTES # BLD AUTO: 0.03 K/UL (ref 0–0.11)
IMM GRANULOCYTES NFR BLD AUTO: 0.4 % (ref 0–0.9)
KETONES UR STRIP.AUTO-MCNC: ABNORMAL MG/DL
LEUKOCYTE ESTERASE UR QL STRIP.AUTO: ABNORMAL
LIPASE SERPL-CCNC: 37 U/L (ref 11–82)
LYMPHOCYTES # BLD AUTO: 2.64 K/UL (ref 1–4.8)
LYMPHOCYTES NFR BLD: 33.1 % (ref 22–41)
MCH RBC QN AUTO: 29 PG (ref 27–33)
MCHC RBC AUTO-ENTMCNC: 33.4 G/DL (ref 33.6–35)
MCV RBC AUTO: 86.8 FL (ref 81.4–97.8)
MICRO URNS: ABNORMAL
MONOCYTES # BLD AUTO: 0.47 K/UL (ref 0–0.85)
MONOCYTES NFR BLD AUTO: 5.9 % (ref 0–13.4)
NEUTROPHILS # BLD AUTO: 4.61 K/UL (ref 2–7.15)
NEUTROPHILS NFR BLD: 57.9 % (ref 44–72)
NITRITE UR QL STRIP.AUTO: NEGATIVE
NRBC # BLD AUTO: 0 K/UL
NRBC BLD-RTO: 0 /100 WBC
NUMBER OF RH DOSES IND 8505RD: NORMAL
PH UR STRIP.AUTO: 5.5 [PH] (ref 5–8)
PLATELET # BLD AUTO: 186 K/UL (ref 164–446)
PMV BLD AUTO: 12 FL (ref 9–12.9)
POTASSIUM SERPL-SCNC: 4.2 MMOL/L (ref 3.6–5.5)
PROT SERPL-MCNC: 7.3 G/DL (ref 6–8.2)
PROT UR QL STRIP: 30 MG/DL
RBC # BLD AUTO: 5.17 M/UL (ref 4.2–5.4)
RBC # URNS HPF: ABNORMAL /HPF
RBC UR QL AUTO: ABNORMAL
RH BLD: NORMAL
RH BLD: NORMAL
SODIUM SERPL-SCNC: 139 MMOL/L (ref 135–145)
SP GR UR STRIP.AUTO: 1.03
UROBILINOGEN UR STRIP.AUTO-MCNC: 1 MG/DL
WBC # BLD AUTO: 8 K/UL (ref 4.8–10.8)
WBC #/AREA URNS HPF: ABNORMAL /HPF

## 2022-05-28 PROCEDURE — 86900 BLOOD TYPING SEROLOGIC ABO: CPT

## 2022-05-28 PROCEDURE — 160029 HCHG SURGERY MINUTES - 1ST 30 MINS LEVEL 4: Performed by: OBSTETRICS & GYNECOLOGY

## 2022-05-28 PROCEDURE — 81001 URINALYSIS AUTO W/SCOPE: CPT

## 2022-05-28 PROCEDURE — 84702 CHORIONIC GONADOTROPIN TEST: CPT

## 2022-05-28 PROCEDURE — 501586 HCHG TROCAR, THRD SPIKE 5X55: Performed by: OBSTETRICS & GYNECOLOGY

## 2022-05-28 PROCEDURE — 160048 HCHG OR STATISTICAL LEVEL 1-5: Performed by: OBSTETRICS & GYNECOLOGY

## 2022-05-28 PROCEDURE — 80053 COMPREHEN METABOLIC PANEL: CPT

## 2022-05-28 PROCEDURE — 160036 HCHG PACU - EA ADDL 30 MINS PHASE I: Performed by: OBSTETRICS & GYNECOLOGY

## 2022-05-28 PROCEDURE — G0378 HOSPITAL OBSERVATION PER HR: HCPCS

## 2022-05-28 PROCEDURE — 502704 HCHG DEVICE, LIGASURE IMPACT: Performed by: OBSTETRICS & GYNECOLOGY

## 2022-05-28 PROCEDURE — 160009 HCHG ANES TIME/MIN: Performed by: OBSTETRICS & GYNECOLOGY

## 2022-05-28 PROCEDURE — 86901 BLOOD TYPING SEROLOGIC RH(D): CPT

## 2022-05-28 PROCEDURE — 99291 CRITICAL CARE FIRST HOUR: CPT

## 2022-05-28 PROCEDURE — 160002 HCHG RECOVERY MINUTES (STAT): Performed by: OBSTETRICS & GYNECOLOGY

## 2022-05-28 PROCEDURE — 700101 HCHG RX REV CODE 250: Performed by: OBSTETRICS & GYNECOLOGY

## 2022-05-28 PROCEDURE — 501838 HCHG SUTURE GENERAL: Performed by: OBSTETRICS & GYNECOLOGY

## 2022-05-28 PROCEDURE — 160035 HCHG PACU - 1ST 60 MINS PHASE I: Performed by: OBSTETRICS & GYNECOLOGY

## 2022-05-28 PROCEDURE — 83690 ASSAY OF LIPASE: CPT

## 2022-05-28 PROCEDURE — 00840 ANES IPER PX LOWER ABD NOS: CPT | Performed by: ANESTHESIOLOGY

## 2022-05-28 PROCEDURE — 36415 COLL VENOUS BLD VENIPUNCTURE: CPT

## 2022-05-28 PROCEDURE — 700111 HCHG RX REV CODE 636 W/ 250 OVERRIDE (IP): Performed by: OBSTETRICS & GYNECOLOGY

## 2022-05-28 PROCEDURE — A9270 NON-COVERED ITEM OR SERVICE: HCPCS | Performed by: ANESTHESIOLOGY

## 2022-05-28 PROCEDURE — 501582 HCHG TROCAR, THRD BLADED: Performed by: OBSTETRICS & GYNECOLOGY

## 2022-05-28 PROCEDURE — 700102 HCHG RX REV CODE 250 W/ 637 OVERRIDE(OP): Performed by: ANESTHESIOLOGY

## 2022-05-28 PROCEDURE — 700105 HCHG RX REV CODE 258: Performed by: ANESTHESIOLOGY

## 2022-05-28 PROCEDURE — 76801 OB US < 14 WKS SINGLE FETUS: CPT

## 2022-05-28 PROCEDURE — 500886 HCHG PACK, LAPAROSCOPY: Performed by: OBSTETRICS & GYNECOLOGY

## 2022-05-28 PROCEDURE — 85025 COMPLETE CBC W/AUTO DIFF WBC: CPT

## 2022-05-28 PROCEDURE — 88305 TISSUE EXAM BY PATHOLOGIST: CPT

## 2022-05-28 PROCEDURE — 700101 HCHG RX REV CODE 250: Performed by: ANESTHESIOLOGY

## 2022-05-28 PROCEDURE — 700111 HCHG RX REV CODE 636 W/ 250 OVERRIDE (IP): Performed by: ANESTHESIOLOGY

## 2022-05-28 PROCEDURE — 500868 HCHG NEEDLE, SURGI(VARES): Performed by: OBSTETRICS & GYNECOLOGY

## 2022-05-28 PROCEDURE — 160041 HCHG SURGERY MINUTES - EA ADDL 1 MIN LEVEL 4: Performed by: OBSTETRICS & GYNECOLOGY

## 2022-05-28 PROCEDURE — 86850 RBC ANTIBODY SCREEN: CPT

## 2022-05-28 RX ORDER — SODIUM CHLORIDE, SODIUM LACTATE, POTASSIUM CHLORIDE, CALCIUM CHLORIDE 600; 310; 30; 20 MG/100ML; MG/100ML; MG/100ML; MG/100ML
INJECTION, SOLUTION INTRAVENOUS CONTINUOUS
Status: DISCONTINUED | OUTPATIENT
Start: 2022-05-28 | End: 2022-05-29 | Stop reason: HOSPADM

## 2022-05-28 RX ORDER — HYDROMORPHONE HYDROCHLORIDE 1 MG/ML
0.1 INJECTION, SOLUTION INTRAMUSCULAR; INTRAVENOUS; SUBCUTANEOUS
Status: DISCONTINUED | OUTPATIENT
Start: 2022-05-28 | End: 2022-05-29 | Stop reason: HOSPADM

## 2022-05-28 RX ORDER — BUPIVACAINE HYDROCHLORIDE AND EPINEPHRINE 5; 5 MG/ML; UG/ML
INJECTION, SOLUTION EPIDURAL; INTRACAUDAL; PERINEURAL
Status: DISCONTINUED | OUTPATIENT
Start: 2022-05-28 | End: 2022-05-28 | Stop reason: HOSPADM

## 2022-05-28 RX ORDER — IBUPROFEN 800 MG/1
800 TABLET ORAL EVERY 8 HOURS PRN
Qty: 30 TABLET | Refills: 0 | Status: SHIPPED | OUTPATIENT
Start: 2022-05-28

## 2022-05-28 RX ORDER — IBUPROFEN 200 MG
800 TABLET ORAL EVERY 8 HOURS PRN
Status: DISCONTINUED | OUTPATIENT
Start: 2022-05-28 | End: 2022-05-29 | Stop reason: HOSPADM

## 2022-05-28 RX ORDER — MIDAZOLAM HYDROCHLORIDE 1 MG/ML
INJECTION INTRAMUSCULAR; INTRAVENOUS PRN
Status: DISCONTINUED | OUTPATIENT
Start: 2022-05-28 | End: 2022-05-28 | Stop reason: SURG

## 2022-05-28 RX ORDER — OXYCODONE HYDROCHLORIDE AND ACETAMINOPHEN 5; 325 MG/1; MG/1
1 TABLET ORAL
Status: COMPLETED | OUTPATIENT
Start: 2022-05-28 | End: 2022-05-28

## 2022-05-28 RX ORDER — NYSTATIN 100000 [USP'U]/G
1 POWDER TOPICAL 3 TIMES DAILY
Qty: 120 G | Refills: 0 | Status: SHIPPED | OUTPATIENT
Start: 2022-05-28

## 2022-05-28 RX ORDER — DIPHENHYDRAMINE HYDROCHLORIDE 50 MG/ML
12.5 INJECTION INTRAMUSCULAR; INTRAVENOUS
Status: DISCONTINUED | OUTPATIENT
Start: 2022-05-28 | End: 2022-05-29 | Stop reason: HOSPADM

## 2022-05-28 RX ORDER — OXYCODONE HYDROCHLORIDE AND ACETAMINOPHEN 5; 325 MG/1; MG/1
1 TABLET ORAL EVERY 4 HOURS PRN
Status: DISCONTINUED | OUTPATIENT
Start: 2022-05-28 | End: 2022-05-29 | Stop reason: HOSPADM

## 2022-05-28 RX ORDER — IPRATROPIUM BROMIDE AND ALBUTEROL SULFATE 2.5; .5 MG/3ML; MG/3ML
3 SOLUTION RESPIRATORY (INHALATION)
Status: DISCONTINUED | OUTPATIENT
Start: 2022-05-28 | End: 2022-05-29 | Stop reason: HOSPADM

## 2022-05-28 RX ORDER — SODIUM CHLORIDE, SODIUM LACTATE, POTASSIUM CHLORIDE, CALCIUM CHLORIDE 600; 310; 30; 20 MG/100ML; MG/100ML; MG/100ML; MG/100ML
INJECTION, SOLUTION INTRAVENOUS
Status: DISCONTINUED | OUTPATIENT
Start: 2022-05-28 | End: 2022-05-28 | Stop reason: SURG

## 2022-05-28 RX ORDER — HYDROMORPHONE HYDROCHLORIDE 1 MG/ML
0.2 INJECTION, SOLUTION INTRAMUSCULAR; INTRAVENOUS; SUBCUTANEOUS
Status: DISCONTINUED | OUTPATIENT
Start: 2022-05-28 | End: 2022-05-29 | Stop reason: HOSPADM

## 2022-05-28 RX ORDER — OXYCODONE HYDROCHLORIDE AND ACETAMINOPHEN 5; 325 MG/1; MG/1
1 TABLET ORAL EVERY 4 HOURS PRN
Qty: 20 TABLET | Refills: 0 | Status: SHIPPED | OUTPATIENT
Start: 2022-05-28 | End: 2022-06-04

## 2022-05-28 RX ORDER — ONDANSETRON 2 MG/ML
INJECTION INTRAMUSCULAR; INTRAVENOUS PRN
Status: DISCONTINUED | OUTPATIENT
Start: 2022-05-28 | End: 2022-05-28 | Stop reason: SURG

## 2022-05-28 RX ORDER — HYDROMORPHONE HYDROCHLORIDE 2 MG/ML
INJECTION, SOLUTION INTRAMUSCULAR; INTRAVENOUS; SUBCUTANEOUS PRN
Status: DISCONTINUED | OUTPATIENT
Start: 2022-05-28 | End: 2022-05-28 | Stop reason: SURG

## 2022-05-28 RX ORDER — PHENYLEPHRINE HCL IN 0.9% NACL 0.5 MG/5ML
SYRINGE (ML) INTRAVENOUS PRN
Status: DISCONTINUED | OUTPATIENT
Start: 2022-05-28 | End: 2022-05-28 | Stop reason: SURG

## 2022-05-28 RX ORDER — CEFAZOLIN SODIUM 1 G/3ML
INJECTION, POWDER, FOR SOLUTION INTRAMUSCULAR; INTRAVENOUS PRN
Status: DISCONTINUED | OUTPATIENT
Start: 2022-05-28 | End: 2022-05-28 | Stop reason: SURG

## 2022-05-28 RX ORDER — HYDROMORPHONE HYDROCHLORIDE 1 MG/ML
0.4 INJECTION, SOLUTION INTRAMUSCULAR; INTRAVENOUS; SUBCUTANEOUS
Status: DISCONTINUED | OUTPATIENT
Start: 2022-05-28 | End: 2022-05-29 | Stop reason: HOSPADM

## 2022-05-28 RX ORDER — HALOPERIDOL 5 MG/ML
1 INJECTION INTRAMUSCULAR
Status: DISCONTINUED | OUTPATIENT
Start: 2022-05-28 | End: 2022-05-29 | Stop reason: HOSPADM

## 2022-05-28 RX ORDER — KETOROLAC TROMETHAMINE 30 MG/ML
INJECTION, SOLUTION INTRAMUSCULAR; INTRAVENOUS
Status: DISCONTINUED
Start: 2022-05-28 | End: 2022-05-29 | Stop reason: HOSPADM

## 2022-05-28 RX ORDER — KETOROLAC TROMETHAMINE 30 MG/ML
30 INJECTION, SOLUTION INTRAMUSCULAR; INTRAVENOUS ONCE
Status: COMPLETED | OUTPATIENT
Start: 2022-05-28 | End: 2022-05-28

## 2022-05-28 RX ORDER — DEXAMETHASONE SODIUM PHOSPHATE 4 MG/ML
INJECTION, SOLUTION INTRA-ARTICULAR; INTRALESIONAL; INTRAMUSCULAR; INTRAVENOUS; SOFT TISSUE PRN
Status: DISCONTINUED | OUTPATIENT
Start: 2022-05-28 | End: 2022-05-28 | Stop reason: SURG

## 2022-05-28 RX ORDER — ONDANSETRON 2 MG/ML
4 INJECTION INTRAMUSCULAR; INTRAVENOUS
Status: DISCONTINUED | OUTPATIENT
Start: 2022-05-28 | End: 2022-05-29 | Stop reason: HOSPADM

## 2022-05-28 RX ORDER — MEPERIDINE HYDROCHLORIDE 25 MG/ML
12.5 INJECTION INTRAMUSCULAR; INTRAVENOUS; SUBCUTANEOUS
Status: DISCONTINUED | OUTPATIENT
Start: 2022-05-28 | End: 2022-05-29 | Stop reason: HOSPADM

## 2022-05-28 RX ORDER — OXYCODONE HYDROCHLORIDE AND ACETAMINOPHEN 5; 325 MG/1; MG/1
2 TABLET ORAL
Status: COMPLETED | OUTPATIENT
Start: 2022-05-28 | End: 2022-05-28

## 2022-05-28 RX ORDER — SIMETHICONE 125 MG
125 TABLET,CHEWABLE ORAL 3 TIMES DAILY PRN
Status: DISCONTINUED | OUTPATIENT
Start: 2022-05-28 | End: 2022-05-29 | Stop reason: HOSPADM

## 2022-05-28 RX ORDER — ROCURONIUM BROMIDE 10 MG/ML
INJECTION, SOLUTION INTRAVENOUS PRN
Status: DISCONTINUED | OUTPATIENT
Start: 2022-05-28 | End: 2022-05-28 | Stop reason: SURG

## 2022-05-28 RX ADMIN — KETOROLAC TROMETHAMINE 30 MG: 30 INJECTION, SOLUTION INTRAMUSCULAR at 23:45

## 2022-05-28 RX ADMIN — SUGAMMADEX 200 MG: 100 INJECTION, SOLUTION INTRAVENOUS at 22:52

## 2022-05-28 RX ADMIN — HYDROMORPHONE HYDROCHLORIDE 0.5 MG: 2 INJECTION INTRAMUSCULAR; INTRAVENOUS; SUBCUTANEOUS at 23:05

## 2022-05-28 RX ADMIN — OXYCODONE HYDROCHLORIDE AND ACETAMINOPHEN 2 TABLET: 5; 325 TABLET ORAL at 23:47

## 2022-05-28 RX ADMIN — ONDANSETRON 4 MG: 2 INJECTION INTRAMUSCULAR; INTRAVENOUS at 22:52

## 2022-05-28 RX ADMIN — ROCURONIUM BROMIDE 50 MG: 10 INJECTION, SOLUTION INTRAVENOUS at 21:41

## 2022-05-28 RX ADMIN — PROPOFOL 200 MG: 10 INJECTION, EMULSION INTRAVENOUS at 21:41

## 2022-05-28 RX ADMIN — HYDROMORPHONE HYDROCHLORIDE 1 MG: 2 INJECTION INTRAMUSCULAR; INTRAVENOUS; SUBCUTANEOUS at 21:35

## 2022-05-28 RX ADMIN — Medication 100 MCG: at 22:43

## 2022-05-28 RX ADMIN — PROPOFOL 50 MG: 10 INJECTION, EMULSION INTRAVENOUS at 23:05

## 2022-05-28 RX ADMIN — DEXAMETHASONE SODIUM PHOSPHATE 8 MG: 4 INJECTION, SOLUTION INTRA-ARTICULAR; INTRALESIONAL; INTRAMUSCULAR; INTRAVENOUS; SOFT TISSUE at 21:41

## 2022-05-28 RX ADMIN — CEFAZOLIN 2 G: 330 INJECTION, POWDER, FOR SOLUTION INTRAMUSCULAR; INTRAVENOUS at 21:41

## 2022-05-28 RX ADMIN — PROPOFOL 50 MG: 10 INJECTION, EMULSION INTRAVENOUS at 22:52

## 2022-05-28 RX ADMIN — HYDROMORPHONE HYDROCHLORIDE 0.5 MG: 2 INJECTION INTRAMUSCULAR; INTRAVENOUS; SUBCUTANEOUS at 22:52

## 2022-05-28 RX ADMIN — SODIUM CHLORIDE, POTASSIUM CHLORIDE, SODIUM LACTATE AND CALCIUM CHLORIDE: 600; 310; 30; 20 INJECTION, SOLUTION INTRAVENOUS at 21:32

## 2022-05-28 RX ADMIN — ROCURONIUM BROMIDE 20 MG: 10 INJECTION, SOLUTION INTRAVENOUS at 22:08

## 2022-05-28 RX ADMIN — MIDAZOLAM HYDROCHLORIDE 2 MG: 1 INJECTION, SOLUTION INTRAMUSCULAR; INTRAVENOUS at 21:35

## 2022-05-28 ASSESSMENT — LIFESTYLE VARIABLES
HAVE PEOPLE ANNOYED YOU BY CRITICIZING YOUR DRINKING: NO
CONSUMPTION TOTAL: INCOMPLETE
EVER HAD A DRINK FIRST THING IN THE MORNING TO STEADY YOUR NERVES TO GET RID OF A HANGOVER: NO
TOTAL SCORE: 0
HAVE YOU EVER FELT YOU SHOULD CUT DOWN ON YOUR DRINKING: NO
TOTAL SCORE: 0
EVER FELT BAD OR GUILTY ABOUT YOUR DRINKING: NO
DO YOU DRINK ALCOHOL: NO
TOTAL SCORE: 0

## 2022-05-28 ASSESSMENT — PAIN DESCRIPTION - PAIN TYPE
TYPE: SURGICAL PAIN

## 2022-05-28 NOTE — ED NOTES
Med rec updated and complete. Allergies reviewed. Confirmed name and date of birth.    Pt denies taking medications.      Pt is from [facility] and will use [employer] [phone]    Pt uses [facility] [phone]

## 2022-05-28 NOTE — ED TRIAGE NOTES
Chief Complaint   Patient presents with   • Pregnancy     6 weeks   • Abdominal Cramping     Started this morning, with brown discharge     Pt ambulatory to triage for above complaint. Pt reports her doctor told her to come to ED if she had any cramping or bleeding. Pt reports suspicion of ectopic pregnancy by her doctor. Pt denies bleeding but reports brown discharge, all starting today. Pt had a previous miscarriage.

## 2022-05-28 NOTE — ED PROVIDER NOTES
"ED Provider Note    Scribed for Yaquelin Naranjo M.D. by Sid Lee. 2022  1:10 PM    Means of arrival: Walk-in  History obtained from: Patient  History limited by: None    CHIEF COMPLAINT  Chief Complaint   Patient presents with   • Pregnancy     6 weeks   • Abdominal Cramping     Started this morning, with brown discharge     HPI  Kenzie Gray is a 26 y.o.  female at approximately 6 weeks by dates who presents to the Emergency Department for acute, mild abdominal cramping onset two weeks ago. Per patient, she is currently 6 weeks pregnant and has been experiencing abdominal cramping and vaginal bleeding. She had an ultrasound done at [place] last Thursday and is not sure what this showed. Her vaginal bleeding and abdominal cramping started last Tuesday which subsided. This morning, she began experiencing abdominal cramping again and notes a \"brown discharge\" but denies any heavy bleeding. Her OBGYN recommended she present to the ED for any cramping or bleeding. Patient additionally notes they are concerned for an ectopic pregnancy. She has a history of miscarriage when she was 20. Denies fever or dysuria. She took a Tylenol this morning which alleviated her symptoms.    REVIEW OF SYSTEMS  Pertinent positive include abdominal cramping, pregnancy, and vaginal bleeding. Pertinent negative include fever or dysuria. All other systems reviewed and are negative.    PAST MEDICAL HISTORY   has a past medical history of Allergy, Bronchitis (2017), Depression, Gynecological disorder, Headache(784.0), Heart burn, Menses, irregular, Migraine, Migraines, Snoring, and Urinary incontinence.    SOCIAL HISTORY  Social History     Tobacco Use   • Smoking status: Never Smoker   • Smokeless tobacco: Never Used   Vaping Use   • Vaping Use: Never used   Substance and Sexual Activity   • Alcohol use: Not Currently     Comment: every few months   • Drug use: No     Types: Inhaled   • Sexual activity: Never " "      SURGICAL HISTORY   has a past surgical history that includes lap, kaylee restrict proc, longitudinal gas* (8/5/2019) and francine by laparoscopy (9/11/2019).    CURRENT MEDICATIONS  Home Medications     Reviewed by Candy Hoffman R.N. (Registered Nurse) on 05/28/22 at 1240  Med List Status: Not Addressed   Medication Last Dose Status   lansoprazole (PREVACID) 30 MG TABLET DISPERSIBLE  Active   promethazine (PHENERGAN) 25 MG Tab  Active              ALLERGIES  No Known Allergies    PHYSICAL EXAM   VITAL SIGNS: /74   Pulse 84   Temp 36.1 °C (97 °F) (Oral)   Resp 14   Ht 1.803 m (5' 11\")   Wt 101 kg (222 lb 10.6 oz)   SpO2 100%   BMI 31.06 kg/m²    Constitutional: Nontoxic appearing, alert in no apparent distress.  HENT: Normocephalic, Atraumatic. Bilateral external ears normal. Nose normal.  Moist mucous membranes.  Oropharynx clear.  Eyes: Pupils are equal and reactive. Conjunctiva normal.   Neck: Supple, full range of motion  Heart: Regular rate and rhythm.  No murmurs.    Lungs: No respiratory distress, normal work of breathing. Lungs clear to auscultation bilaterally.  Abdomen Soft, no distention.  No tenderness to palpation.  Musculoskeletal: Atraumatic. No obvious deformities noted.  No lower extremity edema.  Skin: Warm, Dry.  No erythema, No rash.   Neurologic: Alert and oriented x3. Moving all extremities spontaneously without focal deficits.  Psychiatric: Affect normal, Mood normal, Appears appropriate and not intoxicated.    DIAGNOSTIC STUDIES    LABS  Personally reviewed by me  Labs Reviewed   CBC WITH DIFFERENTIAL - Abnormal; Notable for the following components:       Result Value    MCHC 33.4 (*)     All other components within normal limits   COMP METABOLIC PANEL - Abnormal; Notable for the following components:    Glucose 114 (*)     All other components within normal limits   HCG QUANTITATIVE - Abnormal; Notable for the following components:    Bhcg 1176.0 (*)     All other components " "within normal limits   LIPASE   URINALYSIS,CULTURE IF INDICATED    Narrative:     Indication for culture:->Pregnant women: fever and/or  asymptomatic screening   ESTIMATED GFR   RH TYPE FOR RHOGAM FROM E.D.    Narrative:     Print Consent?->No   URINE MICROSCOPIC (W/UA)    Narrative:     Indication for culture:->Pregnant women: fever and/or  asymptomatic screening     RADIOLOGY  Personally reviewed by me  US-OB 1ST TRIMESTER WITH TRANSVAGINAL (COMBO)   Final Result      1.  No intrauterine pregnancy identified.      2.  Right adnexal mass measuring 2.9 x 2.1 x 1.6 cm with moderate amount of pelvic free fluid concerning for ruptured ectopic pregnancy.      3.  This was discussed with Dr. Yaquelin Naranjo at 3:00 PM.        ED COURSE  Vitals:    05/28/22 1231 05/28/22 1238   BP: 120/74    Pulse: 84    Resp: 14    Temp: 36.1 °C (97 °F)    TempSrc: Oral    SpO2: 100%    Weight:  101 kg (222 lb 10.6 oz)   Height: 1.803 m (5' 11\")      Medications administered:  Medications - No data to display    Old records personally reviewed:  None available in our system    1:10 PM - Patient seen and examined at bedside. The patient presents with acute, mild abdominal cramping secondary to pregnancy. Ordered for US-Ob w/, Urine Microscopic w/ UA, Rh Rhogam, eGFR, CBC w/ diff, CMP, Lipase, HCG quant, and  UA Culture if indicated to evaluate.    MEDICAL DECISION MAKING  Patient who is approximately 6 weeks pregnant by dates who presents with some abdominal cramping and spotting today.  She has had some ongoing symptoms and has been followed by her primary care physician who is trending hCG although I do not have those records.  She is nontoxic-appearing with normal vital signs.  Her abdominal exam is completely benign without any tenderness.  Labs are reassuring without leukocytosis, anemia, electrolyte abnormality.  Patient's quant is 1100.  Rh+ therefore RhoGAM is not indicated.  Ultrasound does show findings concerning for ruptured " ectopic pregnancy.    3:05 PM - I discussed the patient's case and the above findings with Dr. Castrejon (Gynecology) who will consult with the patient. She does feel that this likely ruptured a few days prior however she will plan for operative intervention this evening    3:12 PM - Patient was reevaluated at bedside. Discussed radiology results which showed an ectopic pregnancy.  I informed her of my consult with Dr. Castrejon and the plan of care for likely surgery this evening. Patient agrees to the plan of care.  She remains pain-free at this time with normal vitals and a benign abdominal exam.        DISPOSITION:  Patient will be hospitalized by Dr. Castrejon in guarded condition.    IMPRESSION  (O00.101) Right tubal pregnancy without intrauterine pregnancy    Results, diagnoses, and treatment options were discussed with the patient and/or family. Patient verbalized understanding of plan of care.    Sid JANSEN (Scribe), am scribing for, and in the presence of, Yaquelin Naranjo M.D..    Electronically signed by: Sid Lee (Scribe), 5/28/2022    Yaquelin JANSEN M.D. personally performed the services described in this documentation, as scribed by Sid Lee in my presence, and it is both accurate and complete.    The note accurately reflects work and decisions made by me.  Yaquelin Naranjo M.D.  5/28/2022  8:04 PM

## 2022-05-29 VITALS
TEMPERATURE: 97.8 F | WEIGHT: 222.66 LBS | HEART RATE: 78 BPM | DIASTOLIC BLOOD PRESSURE: 55 MMHG | SYSTOLIC BLOOD PRESSURE: 113 MMHG | HEIGHT: 71 IN | OXYGEN SATURATION: 94 % | BODY MASS INDEX: 31.17 KG/M2 | RESPIRATION RATE: 20 BRPM

## 2022-05-29 ASSESSMENT — PAIN DESCRIPTION - PAIN TYPE
TYPE: SURGICAL PAIN
TYPE: SURGICAL PAIN

## 2022-05-29 ASSESSMENT — PAIN SCALES - GENERAL: PAIN_LEVEL: 4

## 2022-05-29 NOTE — DISCHARGE INSTR - OTHER INFO
Pelvic rest, no heavy lifting pulling pushing x 4 weeks  May shower but no bathtubs/soaks/hot tubs  Regular diet  No Sex

## 2022-05-29 NOTE — DISCHARGE INSTRUCTIONS
ACTIVITY: Rest and take it easy for the first 24 hours.  A responsible adult is recommended to remain with you during that time.  It is normal to feel sleepy.  We encourage you to not do anything that requires balance, judgment or coordination.    MILD FLU-LIKE SYMPTOMS ARE NORMAL. YOU MAY EXPERIENCE GENERALIZED MUSCLE ACHES, THROAT IRRITATION, HEADACHE AND/OR SOME NAUSEA.    FOR 24 HOURS DO NOT:  Drive, operate machinery or run household appliances.  Drink beer or alcoholic beverages.   Make important decisions or sign legal documents.    SPECIAL INSTRUCTIONS:  Pelvic rest, no heavy lifting pulling pushing x 4 weeks  May shower but no bathtubs/soaks/hot tubs  Regular diet  No Sex    DIET: To avoid nausea, slowly advance diet as tolerated, avoiding spicy or greasy foods for the first day.  Add more substantial food to your diet according to your physician's instructions. INCREASE FLUIDS AND FIBER TO AVOID CONSTIPATION.    SURGICAL DRESSING/BATHING: May shower after 48 hours;do not submerge in water like tub,pool etc.    FOLLOW-UP APPOINTMENT:  A follow-up appointment should be arranged with your doctor in 2 weeks; call to schedule.    You should CALL YOUR PHYSICIAN if you develop:  Fever greater than 101 degrees F.  Pain not relieved by medication, or persistent nausea or vomiting.  Excessive bleeding (blood soaking through dressing) or unexpected drainage from the wound.  Extreme redness or swelling around the incision site, drainage of pus or foul smelling drainage.  Inability to urinate or empty your bladder within 8 hours.  Problems with breathing or chest pain.    You should call 911 if you develop problems with breathing or chest pain.  If you are unable to contact your doctor or surgical center, you should go to the nearest emergency room or urgent care center.  Physician's telephone #: [phone]    If any questions arise, call your doctor.  If your doctor is not available, please feel free to call the  Surgical Center at [phone].     A registered nurse may call you a few days after your surgery to see how you are doing after your procedure.    MEDICATIONS: Resume taking daily medication.  Take prescribed pain medication with food.  If no medication is prescribed, you may take non-aspirin pain medication if needed.  PAIN MEDICATION CAN BE VERY CONSTIPATING.  Take a stool softener or laxative such as senokot, pericolace, or milk of magnesia if needed.    Prescription given for Percocet  Last pain medication given at 11:49 PM.    If your physician has prescribed pain medication that includes Acetaminophen (Tylenol), do not take additional Acetaminophen (Tylenol) while taking the prescribed medication.    Depression / Suicide Risk    As you are discharged from this [facility] facility, it is important to learn how to keep safe from harming yourself.    Recognize the warning signs:  Abrupt changes in personality, positive or negative- including increase in energy   Giving away possessions  Change in eating patterns- significant weight changes-  positive or negative  Change in sleeping patterns- unable to sleep or sleeping all the time   Unwillingness or inability to communicate  Depression  Unusual sadness, discouragement and loneliness  Talk of wanting to die  Neglect of personal appearance   Rebelliousness- reckless behavior  Withdrawal from people/activities they love  Confusion- inability to concentrate     If you or a loved one observes any of these behaviors or has concerns about self-harm, here's what you can do:  Talk about it- your feelings and reasons for harming yourself  Remove any means that you might use to hurt yourself (examples: pills, rope, extension cords, firearm)  Get professional help from the community (Mental Health, Substance Abuse, psychological counseling)  Do not be alone:Call your Safe Contact- someone whom you trust who will be there for you.  Call your local CRISIS HOTLINE  905-9810 or 773-697-4448  Call your local Children's Mobile Crisis Response Team [place] (039) 882-2744 or [email or web address]  Call the toll free National Suicide Prevention Hotlines   National Suicide Prevention Lifeline 171-905-RXWP (2274)  [place] [employer] 800-SUICIDE (272-2217)

## 2022-05-29 NOTE — ANESTHESIA PREPROCEDURE EVALUATION
Case: [id number] Date/Time: 05/28/22 1800    Procedures:       PELVISCOPY      SALPINGECTOMY (Right )    Location: [facility] OR 09 / SURGERY [facility]    Surgeons: Christen Castrejon M.D.          Relevant Problems   NEURO   (positive) Migraine headache      CARDIAC   (positive) Migraine headache      GI   (positive) GERD (gastroesophageal reflux disease)   - s/p francine and gastric sleeve in 2019  - No problems with GA    Physical Exam    Airway   Mallampati: II  TM distance: >3 FB  Neck ROM: full       Cardiovascular - normal exam  Rhythm: regular  Rate: normal  (-) murmur     Dental - normal exam           Pulmonary - normal exam  Breath sounds clear to auscultation     Abdominal    Neurological - normal exam                 Anesthesia Plan    ASA 2       Plan - general       Airway plan will be ETT          Induction: intravenous    Postoperative Plan: Postoperative administration of opioids is intended.    Pertinent diagnostic labs and testing reviewed    Informed Consent:    Anesthetic plan and risks discussed with patient.

## 2022-05-29 NOTE — ANESTHESIA TIME REPORT
Anesthesia Start and Stop Event Times     Date Time Event    5/28/2022 2041 Ready for Procedure     2132 Anesthesia Start     2316 Anesthesia Stop        Responsible Staff  05/28/22    Name Role Begin End    Rohit Quintana M.D. Anesth 2132 2316        Overtime Reason:  no overtime (within assigned shift)    Comments:

## 2022-05-29 NOTE — H&P
DATE OF ADMISSION:  2022     HISTORY OF PRESENT ILLNESS:  The patient is a 26-year-old female  who   presents to the emergency room this afternoon for complaints of possible   ectopic pregnancy with mild abdominal cramping and vaginal bleeding for the   last 2 weeks.  The patient has been seen and evaluated at [facility] last Thursday   and it sounds like she was having some hCG levels done, but there was not   much followup, so she was advised to come in to [place].  She reports she started   having some vaginal bleeding and abdominal cramping last Tuesday.  She had   some more pain earlier this morning, which has now resolved and she is in no   pain at all now.  She is having some brown discharge now, but denies any heavy   bleeding.  The patient showed me the ultrasound report from last week showing   no intrauterine pregnancy.  There was a concern for fluid filled hydrosalpinx   on the left side last week and her hCG level per the patient was over 1000 at   that time.  Today, her hCG level is 1176 and ultrasound shows no evidence of   intrauterine pregnancy identified, right adnexal mass present measuring   2.9x2.1x1.6 cm with a moderate amount of free fluid in the pelvis concerning   for ruptured ectopic pregnancy.  Within this right cyst, there are 2 loculated   cyst within the ovary, one appears to be a corpus luteum cyst measuring 1.5   cm, but the adnexal mass, which is heterogeneous with central cystic area   measuring 2.9x2.1x1.6 cm.  The left ovary measures normal and Doppler of the   left ovary shows normal waveforms; however, there is a left ovarian cyst   measuring 2 cm.     PAST MEDICAL HISTORY:  Includes obesity.  She did have a gastric sleeve done   and lost over 200 pounds a few years ago, depression, polycystic ovarian   syndrome, migraine headache.     ALLERGIES:  No known drug allergies.     CURRENT MEDICATIONS:  Include only prenatal vitamins.     SOCIAL HISTORY:  Denies any tobacco,  alcohol or drug use.  Her mother and    are with her at the bedside.     PAST SURGICAL HISTORY:  Includes gastric sleeve and a cholecystectomy.     OBSTETRIC HISTORY:  .  She has had one miscarriage and then this   pregnancy.     GYNECOLOGIC HISTORY:  History of irregular menses up until she had her gastric   sleeve.  Since then, they have been regular.  She denies any abnormal Paps or   history of STDs or pelvic infections.     PHYSICAL EXAMINATION:  VITAL SIGNS:  Stable.  She is afebrile, blood pressures running 120s/70s-80s.    She is satting % on room air.  Her pulse is in the 60s.  GENERAL:  She is awake, alert, oriented.  She is in no acute distress,   although tearful that she does not want to have surgery.  CARDIOVASCULAR:  Regular rate and rhythm.  CHEST:  Clear to auscultation bilaterally.  ABDOMEN:  Obese with lots of excess skin from her weight loss.  Nontender,   nondistended.  No rebound or guarding.  No peritoneal signs.  Her belly button   appears to have a yeast infection within the actual umbilicus, likely due to   skin fold irritation.  EXTREMITIES:  No cyanosis, clubbing or edema.  PELVIC:  Sterile vaginal exam was deferred.     LABORATORY DATA:  She is B positive, antibody screen negative.  Urinalysis on   a clean catch showed a large amount of blood, small amount of leukocyte   esterase, trace protein, trace ketones.  Beta hCG 1176.  Chemistry panel was   all essentially normal, except a slightly high glucose at 114.  Lipase is 37.    White blood cell count 8, hemoglobin 15.0, hematocrit 44.9, platelet count   186,000.     DIAGNOSTIC DATA:  Ultrasound, see HPI.     ASSESSMENT AND PLAN:  A 26-year-old female  with suspected right ectopic   pregnancy, ruptured.  1.  Although, the patient is not having pain at this time, the ultrasound has   a moderate amount of free fluid and the right adnexal mass is suspicious for a   ruptured right ectopic pregnancy.  Recommend  proceeding with surgery.  Risks,   benefits and alternatives have been thoroughly discussed with her.  Risks   including bleeding, infection, pain, injury to bowel, bladder, uterus,   fallopian tubes, ovaries, major blood vessels or nerves.  She does accept the   use of blood products in event of hemorrhage.  We will plan to proceed with   laparoscopy and right salpingectomy as well as removal of hemoperitoneum.  2.  Obesity with a history of an excess skin folds from large weight loss.    This will make laparoscopy slightly more difficult, especially since it   appears to have a yeast infection within the umbilicus.  We will plan to make   my incision either above or below her umbilicus and not go through it.  3.  Polycystic ovarian syndrome, Rh positive.        ______________________________  MD CECELIA SADLER/TANYA/YOU    DD:  05/28/2022 19:05  DT:  05/28/2022 19:50    Job#:  [id number]

## 2022-05-29 NOTE — OR SURGEON
Immediate Post OP Note    PreOp Diagnosis:   Right ectopic pregnancy  Obesity  Hemoperitoneum    PostOp Diagnosis:   Right ectopic pregnancy  Obesity  Hemoperitoneum  Endometriosis visualized by laparoscopy in pelvic cul de sac      Procedure(s):  PELVISCOPY - Wound Class: Clean Contaminated  With Right SALPINGECTOMY - Wound Class: Clean Contaminated  And removal of hemoperitoneum    Surgeon(s):  Christen Castrejon M.D.    Anesthesiologist/Type of Anesthesia:  Anesthesiologist: Rohit Quintana M.D./General    Surgical Staff:  Circulator: Pop Godwin R.N.; Clyde Linder R.N.  Scrub Person: Leopold von C Garcia    Specimens removed if any:  ID Type Source Tests Collected by Time Destination   A : RIGHT TUBE AND ECTOPIC Other Other PATHOLOGY SPECIMEN Christen Castrejon M.D. 5/28/2022 10:44 PM        Estimated Blood Loss: <50cc; apx 200cc in her abdomen    Findings:Obesity and extra skin tissue made entry difficulty  But hemoperitonium. Very large right ectopic pregnancy involving the entire fallopian tube and extruding out the tube, normal appearing right ovary, normal appearing left tubea nd ovary. Endometriosis present in posterior cul de sac an right lateral pelvic sidewall.      Complications: none    Dispo: to RR extubated and stable      5/28/2022 11:22 PM Christen Castrejon M.D.

## 2022-05-29 NOTE — ANESTHESIA POSTPROCEDURE EVALUATION
Patient: Kenzie Gray    Procedure Summary     Date: 05/28/22 Room / Location: [facility] OR 09 / SURGERY [facility]    Anesthesia Start: 2132 Anesthesia Stop: 2316    Procedures:       PELVISCOPY      SALPINGECTOMY (Right ) Diagnosis: (RUPTURED ECTOPIC PREGNANCY)    Surgeons: Christen Castrejon M.D. Responsible Provider: Rohit Quintana M.D.    Anesthesia Type: general ASA Status: 2          Final Anesthesia Type: general  Last vitals  BP   Blood Pressure: 113/55, NIBP: 121/56    Temp   36.6 °C (97.8 °F)    Pulse   78   Resp   20    SpO2   94 %      Anesthesia Post Evaluation    Patient location during evaluation: PACU  Patient participation: complete - patient participated  Level of consciousness: sleepy but conscious  Pain score: 4    Airway patency: patent  Anesthetic complications: no  Cardiovascular status: hemodynamically stable  Respiratory status: acceptable  Hydration status: balanced    PONV: none          There were no known complications for this encounter.     Nurse Pain Score: 4 (NPRS)

## 2022-05-29 NOTE — ANESTHESIA PROCEDURE NOTES
Airway    Date/Time: 5/28/2022 9:42 PM  Performed by: Rohit Quintana M.D.  Authorized by: Rohit Quintana M.D.     Location:  OR  Urgency:  Elective  Indications for Airway Management:  Anesthesia      Spontaneous Ventilation: absent    Sedation Level:  Deep  Preoxygenated: Yes    Patient Position:  Sniffing  Final Airway Type:  Endotracheal airway  Final Endotracheal Airway:  ETT  Cuffed: Yes    Technique Used for Successful ETT Placement:  Direct laryngoscopy    Insertion Site:  Oral  Blade Type:  Ivett  Laryngoscope Blade/Videolaryngoscope Blade Size:  3  ETT Size (mm):  7.0  Measured from:  Teeth  ETT to Teeth (cm):  21  Placement Verified by: auscultation and capnometry    Cormack-Lehane Classification:  Grade I - full view of glottis  Number of Attempts at Approach:  1

## 2022-05-29 NOTE — OP REPORT
Gynecologic Surgical Operative Note         Date:22    Surgeon: Christen Castrejon MD  Assistant: none    Pre-op diagnosis:   1. 26 y.o. .  2. Pelvic pain and vaginal bleeding.  3. Right cystic adnexal mass, suspect Right ectopic pregnancy.  4. Pelvic free fluid, suspicious for hemoperitoneum on ultrasound.  5. Obesity      Post-op diagnosis:  1. 26 y.o. .  2. Pelvic pain.  3. Right cystic adnexal mass, suspect Right ectopic pregnancy.  4. Pelvic free fluid, suspicious for hemoperitoneum on ultrasound.  5.Obesity  6. Confirmed Right ectopic pregnancy.  7. Confirmed hemoperitoneum.  8. Endometriosis    Procedure: Diagnostic and operative laparoscopy,Right salpingectomy and removal of hemoperitoneum    Anesthesia: general via ET tube, local    Findings: Normal appearing vulva, vagina and cervix, mild bleeding though cervix.  Normal-appearing uterus and bilateral ovaries.  Bleeding, dilated Right fallopian tube the entire length of the tube with ectopic extruding out the fimbriated end consistent with bleeding tubal ectopic pregnancy.    Endometriosis is present in pelvic cul de sac and right pelvic sidewall    EBL:200mL in pelvis at beginning of case, minimal (<5mL) intraoperative bleeding    UOP:300mL, clear, straw-colored        Specimens/Cultures: Right fallopian tube with ectopic pregnancy    Drains/Foreign Objects Retained: none    Complications: none    Condition: good    Disposition: PACU then home    Narrative:  Laparoscopic salpingectomy  Having previously been advised on the risks, benefits, and alternatives to diagnostic laparoscopy and surgical treatment of ectopic pregnancy with salpingectomy versus salpingostomy, and signed an operative consent form in preop confirming her desire to proceed with the procedure. The patient was then taken to the operating room with an IV in place. She was administered general anesthesia by ET tube and then placed into the dorsal lithotomy position. She was  prepped and draped in the usual sterile fashion for a gynecologic laparoscopic procedure. During the prep, the patient had a Brandon catheter placed in her bladder and sequential compression devices placed on her legs for thrombotic prevention. At this point, a timeout was taken. The patient and the procedure were positively identified.     Attention was turned to the vaginal portion of the procedure where a bivalve speculum was placed in the vagina. The anterior lip of the cervix was visualized and grasped with a single-tooth tenaculum. A Hulka tenaculum was then placed through the cervix into the uterus and secured onto the anterior lip of the cervix. The single-tooth tenaculum was then removed from the anterior lip of the cervix. The bivalve speculum was removed from the patient's vagina.     The surgical team then donned newly sterile scrub wear and attended to the abdominal portion of the procedure. 0.50% bupivacaine with epinephrine was infiltrated into the infraumbilical area. A 10mm skin incision was made sharply with a scalpel in the infraumbilical region. This was carried down to the underlying layer of fascia bluntly with a hemostat. The abdominal wall was then tented up and a Veress needle was inserted through the abdominal wall but the depth was too much so - the fascia was tagged with kocher clamps and the fascia opened with pham scissors and the peritoneum was identified and entered and trochar was inserted directly. CO2 gas was then insufflated into the abdomen with an Early liver tympany was also noted on percussion. The abdomen was then insufflated to a pressure of 15mmHg.      The laparoscope was inserted through the port with the above findings noted.  Atraumatic entry was noted.  A suprapubic port site was chosen two fingerbreadths superior to the pubic symphysis in the midline. The intraperitoneal location of this point was confirmed by inserting the injection needle through the abdominal wall  and noting the location of the needle with the laparoscope. The abdominal wall was infiltrated with 0.50% bupivacaine with epinephrine. A transverse skin incision was made sharply with a scalpel, and a 5 mm operative port was then inserted through the abdominal wall under direct visualization with the laparoscope. The patient was placed in mild Trendelenburg. A Prestige grasper was then used to sweep the bowel out of the pelvis and the above findings were noted.      First, the patient's cul-de-sac was suctioned of ~200mL of blood present at the beginning of the case.  The Right fallopian tube was enlarged and bleeding from the infundibulum.  The fimbriated end was grasped the Prestige grasper.    It was dilated but not actively bleeding.  The infundibulopelvic ligament was noted to be well out of the planned incision site. The Ligasure was inserted through the working scope, and the attachment to the ovary was transected with good visualization of the infundibulopelvic ligament.  The right fallopian tube was then dissected progressively using a clamp, burn, and cut method sequentially along the mesosalpinx with the Ligasure device to the cornual area of the fallopian tube.  The fallopian tube was then transected lateral to the uterine cornua, again using the bipolar dissection device. This  the Right fallopian tube from the uterus and the mesosalpinx. This tube was then placed in the anterior cul-de-sac for later retrieval.      The uterine fundus and serosa was found to be smooth and without lesions. A survey of the remainder of the abdomen revealed the above findings. The working scope was then exchanged for the 30° 5mm laparoscope. This was introduced through the suprapubic trocar and the specimen visualized. An Endo Catch bag was then inserted through the umbilical port and used to retrieve the specimen. The specimen bag was then removed from the abdomen. Under direct visualization with the  laparoscope, the trocar was then reinserted into the abdomen. The scope was then returned to the infraumbilical port and the pedicles were then inspected off of pneumoperitoneum pressure and remained hemostatic.      Irrigation was performed and endometriosis was visualized in pelvic cul de sac as well as the pelvic right sidewall.   As such, the suprapubic trocar was removed under direct visualization with the laparoscope. The abdomen was then desufflated completely of CO2 gas. The infraumbilical port was removed. S-retractors were then used to assist in location of the patient's fascia which was closed using a running stitch of 0 Vicryl. Palpation of the closure revealed no remaining defects. The skin was then closed with a running stitch of 4-0  Monocryl.. The suprapubic incision was closed with an buried interrupted stitch of 4-0 monocril. The Hulka tenaculum was then removed from the patient's uterus and excellent hemostasis was noted.      The patient tolerated the procedure well. There were no complications. Sponge, lap, needle, and instrument counts were reported to be correct. The patient was awakened and extubated and taken to the recovery room in stable and awake condition for later discharge home.  was present throughout the entire procedure and performed the entire procedure.    Christen Castrejon MD,  5/28/2022, 11:27 PM

## 2022-05-29 NOTE — H&P
"  History and Physical    Kenzie Gray is a 26 y.o. female  -Para:       HPI: 26-year-old female G2, P0 presents to the emergency room with vaginal bleeding and pain with suspected ectopic pregnancy.  She been followed by ultrasound as well as hCG levels and [facility] [place] where they had suspected it was an ectopic pregnancy but she did not have follow-up.  She has had some pain today and her OB/GYN suggested she come to [place] and be evaluated.  An ultrasound last week did show a dilated left fallopian tube and no intrauterine pregnancy but no free fluid.    Her pain dissipated and she had no pain by the time she was seen here in emergency room and she is not requiring any pain medication.  However she still having dark bleeding as well as some intermittent bleeding.  hCG level was done which was 1176 and an ultrasound was done showing amount of free fluid and suspected right ectopic pregnancy with a right adnexal mass.  Additionally she does have a small 2 cm left ovarian cyst present.      Past Med hx: obesity/s/p gastic sleeve lost 200lbs, PCOS   has a past medical history of Allergy, Bronchitis (2017), Depression, Gynecological disorder, Headache(784.0), Heart burn, Menses, irregular, Migraine, Migraines, Snoring, and Urinary incontinence.    Past Surg Hx: Gastric sleeve, laparoscopic cholecystectomy  OB hx: 1 miscarriage when she was 20 and this pregnancy    Medications: Prenatal vitamins    GYN hx: She reports a history of polycystic ovarian syndrome.  Irregular menses prior to her weight loss but now they are regular.  She denies any history of abnormal Paps or history of STDs or vaginal or pelvic infections.  She was not currently on birth control.    Allergies:  Patient has no known allergies.    Social History: Denies etoh/drug/tobacco use    ROS:   Otherwise noncontributory    Physical Exam:  /67   Pulse 78   Temp 36.9 °C (98.4 °F) (Temporal)   Resp 14   Ht 1.803 m (5' 11\")  "  Wt 101 kg (222 lb 10.6 oz)   SpO2 95%   BMI 31.06 kg/m²   Gen: a/a /o in no acute distress  Cardiovascular regular rate and rhythm  Chest clear to auscultation bilaterally  Abdomen obese soft nontender nondistended no rebound or guarding no peritoneal signs.  Excess skin from all her weight loss is present and a yeast infection appears to be in her umbilicus.  Where the skin is red and irritated.    Extremities no cyanosis clubbing or edema  Vaginal exam/pelvic exam: Deferred    Labs: HCG 1176  B+, ab screen negative  Recent Labs     05/28/22  1245   WBC 8.0   RBC 5.17   HEMOGLOBIN 15.0   HEMATOCRIT 44.9   MCV 86.8   MCH 29.0   MCHC 33.4*   RDW 39.7   PLATELETCT 186   MPV 12.0   chemistry panel: wnl    Assessment:  Suspected ruptured right ectopic pregnancy  Obesity  Candida infection of umbilicus    Plan:   Admit for surgery for pelviscopy removal of hemoperitoneum and ectopic pregnancy and right salpingectomy.  Risk, benefits, alternatives were discussed with her at the bedside.  Risks including bleeding infection pain injury to bowel bladder uterus fallopian tubes ovaries major blood vessels or nerves.  She will accept the use of blood products in the event of hemorrhage.  Patient understands that with her excess skin surgery may be more difficult than expected and will try to not go through the umbilicus due to the current yeast infection that is surrounding her umbilicus due to her excessive skin fold.    All questions were answered to her satisfaction at the bedside.    Christen Castrejon M.D.

## 2022-05-31 LAB — PATHOLOGY CONSULT NOTE: NORMAL

## 2022-06-03 ENCOUNTER — HOSPITAL ENCOUNTER (EMERGENCY)
Facility: MEDICAL CENTER | Age: 27
End: 2022-06-03
Attending: EMERGENCY MEDICINE
Payer: COMMERCIAL

## 2022-06-03 VITALS
DIASTOLIC BLOOD PRESSURE: 69 MMHG | OXYGEN SATURATION: 96 % | HEIGHT: 71 IN | WEIGHT: 230.82 LBS | RESPIRATION RATE: 16 BRPM | HEART RATE: 98 BPM | BODY MASS INDEX: 32.31 KG/M2 | TEMPERATURE: 98.6 F | SYSTOLIC BLOOD PRESSURE: 120 MMHG

## 2022-06-03 DIAGNOSIS — O00.90 ECTOPIC PREGNANCY, UNSPECIFIED LOCATION, UNSPECIFIED WHETHER INTRAUTERINE PREGNANCY PRESENT: ICD-10-CM

## 2022-06-03 DIAGNOSIS — N93.9 VAGINAL BLEEDING: ICD-10-CM

## 2022-06-03 LAB
ALBUMIN SERPL BCP-MCNC: 3.7 G/DL (ref 3.2–4.9)
ALBUMIN/GLOB SERPL: 1.3 G/DL
ALP SERPL-CCNC: 82 U/L (ref 30–99)
ALT SERPL-CCNC: 45 U/L (ref 2–50)
ANION GAP SERPL CALC-SCNC: 9 MMOL/L (ref 7–16)
AST SERPL-CCNC: 17 U/L (ref 12–45)
BASOPHILS # BLD AUTO: 0.6 % (ref 0–1.8)
BASOPHILS # BLD: 0.05 K/UL (ref 0–0.12)
BILIRUB SERPL-MCNC: 0.2 MG/DL (ref 0.1–1.5)
BUN SERPL-MCNC: 14 MG/DL (ref 8–22)
CALCIUM SERPL-MCNC: 8.8 MG/DL (ref 8.5–10.5)
CHLORIDE SERPL-SCNC: 112 MMOL/L (ref 96–112)
CO2 SERPL-SCNC: 23 MMOL/L (ref 20–33)
CREAT SERPL-MCNC: 0.49 MG/DL (ref 0.5–1.4)
EOSINOPHIL # BLD AUTO: 0.23 K/UL (ref 0–0.51)
EOSINOPHIL NFR BLD: 2.9 % (ref 0–6.9)
ERYTHROCYTE [DISTWIDTH] IN BLOOD BY AUTOMATED COUNT: 40 FL (ref 35.9–50)
GFR SERPLBLD CREATININE-BSD FMLA CKD-EPI: 134 ML/MIN/1.73 M 2
GLOBULIN SER CALC-MCNC: 2.9 G/DL (ref 1.9–3.5)
GLUCOSE SERPL-MCNC: 83 MG/DL (ref 65–99)
HCG SERPL QL: POSITIVE
HCT VFR BLD AUTO: 38.8 % (ref 37–47)
HGB BLD-MCNC: 12.8 G/DL (ref 12–16)
IMM GRANULOCYTES # BLD AUTO: 0.02 K/UL (ref 0–0.11)
IMM GRANULOCYTES NFR BLD AUTO: 0.3 % (ref 0–0.9)
LYMPHOCYTES # BLD AUTO: 2.71 K/UL (ref 1–4.8)
LYMPHOCYTES NFR BLD: 34.6 % (ref 22–41)
MCH RBC QN AUTO: 28.6 PG (ref 27–33)
MCHC RBC AUTO-ENTMCNC: 33 G/DL (ref 33.6–35)
MCV RBC AUTO: 86.8 FL (ref 81.4–97.8)
MONOCYTES # BLD AUTO: 0.54 K/UL (ref 0–0.85)
MONOCYTES NFR BLD AUTO: 6.9 % (ref 0–13.4)
NEUTROPHILS # BLD AUTO: 4.29 K/UL (ref 2–7.15)
NEUTROPHILS NFR BLD: 54.7 % (ref 44–72)
NRBC # BLD AUTO: 0 K/UL
NRBC BLD-RTO: 0 /100 WBC
PLATELET # BLD AUTO: 177 K/UL (ref 164–446)
PMV BLD AUTO: 12 FL (ref 9–12.9)
POTASSIUM SERPL-SCNC: 4.2 MMOL/L (ref 3.6–5.5)
PROT SERPL-MCNC: 6.6 G/DL (ref 6–8.2)
RBC # BLD AUTO: 4.47 M/UL (ref 4.2–5.4)
SODIUM SERPL-SCNC: 144 MMOL/L (ref 135–145)
WBC # BLD AUTO: 7.8 K/UL (ref 4.8–10.8)

## 2022-06-03 PROCEDURE — 99284 EMERGENCY DEPT VISIT MOD MDM: CPT

## 2022-06-03 PROCEDURE — 80053 COMPREHEN METABOLIC PANEL: CPT

## 2022-06-03 PROCEDURE — 84703 CHORIONIC GONADOTROPIN ASSAY: CPT

## 2022-06-03 PROCEDURE — 36415 COLL VENOUS BLD VENIPUNCTURE: CPT

## 2022-06-03 PROCEDURE — 85025 COMPLETE CBC W/AUTO DIFF WBC: CPT

## 2022-06-03 RX ORDER — MEDROXYPROGESTERONE ACETATE 10 MG/1
10 TABLET ORAL DAILY
Qty: 10 TABLET | Refills: 0 | Status: SHIPPED | OUTPATIENT
Start: 2022-06-03 | End: 2022-06-13

## 2022-06-03 ASSESSMENT — FIBROSIS 4 INDEX: FIB4 SCORE: 0.52

## 2022-06-03 NOTE — ED PROVIDER NOTES
"ED Provider Note    CHIEF COMPLAINT  Chief Complaint   Patient presents with   • Vaginal Bleeding     Patient reports heavy vaginal bleeding, worsening today. Patient states she had surgery last Saturday 5/28 for ectopic pregnancy.   • Sent by MD     Patient sent by Dr Castrejon for bleeding       HPI  Kenzie Gray is a 26 y.o. female who presents complaining of vaginal bleeding.  The patient had a ruptured ectopic pregnancy with salpingectomy and evacuation of hemoperitoneum on the 28th.  She had been feeling fairly fine the last day or so she has had some increasing bleeding, more so than the spotting that she had initially had.  Also some abdominal cramps.  Both bleeding and the cramps are worse than with a typical menstrual cycle.  Today so far she has gone through 3 pads.  She intermittently feels dizzy and lightheaded, but points out that this is not a new symptom for her.  She denies any fever or chills.  There is no other complaint.  She was told her aftercare instructions should she have increasing bleeding she should be evaluated, so she presents here for that.  She has taken some ibuprofen which is not really helping.    PAST MEDICAL HISTORY  Past Medical History:   Diagnosis Date   • Allergy    • Bronchitis 2017   • Depression     and anxiety   • Gynecological disorder     PCOS   • Headache(784.0)    • Heart burn    • Menses, irregular    • Migraine    • Migraines    • Snoring    • Urinary incontinence     \"month ago I had urgency from what my MD said was protein in my urine\"       FAMILY HISTORY  Family History   Problem Relation Age of Onset   • Alcohol/Drug Mother    • Arthritis Maternal Aunt    • Alcohol/Drug Maternal Aunt    • Alcohol/Drug Paternal Aunt    • Lung Disease Paternal Grandmother    • Diabetes Paternal Grandmother    • Stroke Paternal Grandmother    • Cancer Paternal Grandfather        SOCIAL HISTORY  Social History     Tobacco Use   • Smoking status: Never Smoker   • Smokeless " "tobacco: Never Used   Vaping Use   • Vaping Use: Never used   Substance Use Topics   • Alcohol use: Not Currently     Comment: every few months   • Drug use: No     Types: Inhaled         SURGICAL HISTORY  Past Surgical History:   Procedure Laterality Date   • MS LAP,DIAGNOSTIC ABDOMEN  5/28/2022    Procedure: PELVISCOPY;  Surgeon: Christen Castrejon M.D.;  Location: [facility]GERY [facility];  Service: Gynecology   • SALPINGECTOMY Right 5/28/2022    Procedure: SALPINGECTOMY;  Surgeon: Christen Castrejon M.D.;  Location: [facility]GERY [facility];  Service: Gynecology   • MAVERICK BY LAPAROSCOPY  9/11/2019    Procedure: CHOLECYSTECTOMY, LAPAROSCOPIC;  Surgeon: John H Ganser, M.D.;  Location: SURGERY [facility];  Service: General   • MS LAP, NOAM RESTRICT PROC, LONGITUDINAL GAS*  8/5/2019    Procedure: GASTRECTOMY, SLEEVE, LAPAROSCOPIC, HIATAL HERNIA REPAIR;  Surgeon: John H Ganser, M.D.;  Location: SURGERY [facility];  Service: General       CURRENT MEDICATIONS  Home Medications     Reviewed by Sally Stewart R.N. (Registered Nurse) on 06/03/22 at 1218  Med List Status: Partial   Medication Last Dose Status   ibuprofen (MOTRIN) 800 MG Tab  Active   nystatin (MYCOSTATIN) powder  Active   oxyCODONE-acetaminophen (PERCOCET) 5-325 MG Tab  Active                I have reviewed the nurses notes and/or the list brought with the patient.    ALLERGIES  No Known Allergies    REVIEW OF SYSTEMS  See HPI for further details. Review of systems as above, otherwise all other systems are negative.     PHYSICAL EXAM  VITAL SIGNS: /69   Pulse 98   Temp 37 °C (98.6 °F) (Temporal)   Resp 16   Ht 1.803 m (5' 11\")   Wt 105 kg (230 lb 13.2 oz)   SpO2 96%   Breastfeeding Unknown   BMI 32.19 kg/m²     Constitutional: Well appearing patient in no acute distress.  Not toxic, nor ill in appearance.  HENT: Mucus membranes moist.  Oropharynx is clear.  Eyes: Pupils equally round.  No scleral icterus.   Neck: Full nontender range of " motion.  Lymphatic: No cervical lymphadenopathy noted.   Cardiovascular: Regular heart rate and rhythm.  No murmurs, rubs, nor gallop appreciated.   Thorax & Lungs: Chest is nontender.  Lungs are clear to auscultation with good air movement bilaterally.  No wheeze, rhonchi, nor rales.   Abdomen: Soft, with no tenderness, rebound nor guarding.  No mass, pulsatile mass, nor hepatosplenomegaly appreciated.  Surgical incisions are well-healed.  GYN: sarah Martinez, as chaperone.  Normal external genitalia.  There is a little bit of blood in the vault.  The cervix is closed, with no bleeding presently.  Skin: No purpura nor petechia noted.  Extremities/Musculoskeletal: No sign of trauma.   Neurologic: Alert & oriented.  Strength and sensation is intact all around.  Gait is normal.  Psychiatric: Normal affect appropriate for the clinical situation.    LABS  Labs Reviewed   CBC WITH DIFFERENTIAL - Abnormal; Notable for the following components:       Result Value    MCHC 33.0 (*)     All other components within normal limits   COMP METABOLIC PANEL - Abnormal; Notable for the following components:    Creatinine 0.49 (*)     All other components within normal limits   HCG QUAL SERUM - Abnormal; Notable for the following components:    Beta-Hcg Qualitative Serum Positive (*)     All other components within normal limits   ESTIMATED GFR     MEDICAL RECORD  I have reviewed patient's medical record and pertinent results are listed above.  I note that she is Rh+.    COURSE & MEDICAL DECISION MAKING  I have reviewed any medical record information, laboratory studies and radiographic results as noted above.  Patient is 5 days out from salpingectomy for ruptured ectopic.  Is now having heavier bleeding than spotting.  However, quantitatively, she has not had many pads.  Her hemoglobin is dropped from 45-39; I do not have a postop hematocrit as it looks like her surgery was in the evening.  Hemodynamically she is normal here.  She  does not describe qualitative symptoms of significant blood loss.  I did discuss the patient's case with Dr. Vegas who is on-call for Dr. Castrejon today.  He is reviewed her chart.  He is recommended no intervention although if she is having significant symptoms we could go ahead and start Provera, he is left that up to me discussed with her.  I did so.  She is going to wait a day and see how she does overnight.  I wrote her for Provera 10 mg for 10 days should she have ongoing significant symptoms.  We talked about return precautions for increasing bleeding or subjective symptoms of significant blood loss.  She is given instructions on salpingectomy aftercare as well as ruptured ectopic pregnancy.  Her fiancé will be keeping an eye on her.      FINAL IMPRESSION  1. Vaginal bleeding    2. Ectopic pregnancy, unspecified location, unspecified whether intrauterine pregnancy present           This dictation was created using voice recognition software.    Electronically signed by: Liu Stephen M.D., 6/3/2022 4:45 PM

## 2022-06-03 NOTE — DISCHARGE INSTRUCTIONS
Return for increasing bleeding as we discussed.    Provera will probably stop/diminish the bleeding--ok to wait a day and see how things go overnight.

## 2022-06-03 NOTE — ED TRIAGE NOTES
"Chief Complaint   Patient presents with   • Vaginal Bleeding     Patient reports heavy vaginal bleeding, worsening today. Patient states she had surgery last Saturday 5/28 for ectopic pregnancy.   • Sent by MD     Patient sent by Dr Castrejon for bleeding       25 yo female to triage for above complaint. Patient reports surgery for ectopic pregnancy on 5/28 and reports light bleeding until today, when patient reports heavy vaginal bleeding. Sent by Dr Castrejon for evaluation    Pt is alert and oriented, speaking in full sentences, follows commands and responds appropriately to questions.     Patient placed back in lobby and educated on triage process. Asked to inform RN of any changes.    /77   Pulse 98   Temp 36.9 °C (98.4 °F) (Temporal)   Resp 12   Ht 1.803 m (5' 11\")   Wt 105 kg (230 lb 13.2 oz)   SpO2 98%   Breastfeeding Unknown   BMI 32.19 kg/m²     "

## (undated) DEVICE — PROTECTOR ULNA NERVE - (36PR/CA)

## (undated) DEVICE — SUTURE GENERAL

## (undated) DEVICE — TUBING INSUFFLATION - (10/BX)

## (undated) DEVICE — KIT ROOM DECONTAMINATION

## (undated) DEVICE — BAG RETRIEVAL 10ML (10EA/BX)

## (undated) DEVICE — SET LEADWIRE 5 LEAD BEDSIDE DISPOSABLE ECG (1SET OF 5/EA)

## (undated) DEVICE — SODIUM CHL IRRIGATION 0.9% 1000ML (12EA/CA)

## (undated) DEVICE — ELECTRODE 850 FOAM ADHESIVE - HYDROGEL RADIOTRNSPRNT (50/PK)

## (undated) DEVICE — BAG, SPONGE COUNT 50600

## (undated) DEVICE — MASK ANESTHESIA ADULT  - (100/CA)

## (undated) DEVICE — GLOVE BIOGEL SZ 6.5 SURGICAL PF LTX (50PR/BX 4BX/CA)

## (undated) DEVICE — SET EXTENSION WITH 2 PORTS (48EA/CA) ***PART #2C8610 IS A SUBSTITUTE*****

## (undated) DEVICE — GLOVE BIOGEL PI INDICATOR SZ 7.0 SURGICAL PF LF - (50/BX 4BX/CA)

## (undated) DEVICE — DRESSING TRANSPARENT FILM TEGADERM 2.375 X 2.75"  (100EA/BX)"

## (undated) DEVICE — CANNULA W/SEAL 5X100 Z-THRE - ADED KII (12/BX)

## (undated) DEVICE — BANDAID X-LARGE 2 X 4 IN LF (50EA/BX)

## (undated) DEVICE — SUTURE 0 VICRYL PLUS CT-2 - 27 INCH (36/BX)

## (undated) DEVICE — GLOVE BIOGEL SZ 7 SURGICAL PF LTX - (50PR/BX 4BX/CA)

## (undated) DEVICE — SPONGE GAUZESTER. 2X2 4-PL - (2/PK 50PK/BX 30BX/CS)

## (undated) DEVICE — NEEDLE INSFL 120MM 14GA VRRS - (20/BX)

## (undated) DEVICE — HEAD HOLDER JUNIOR/ADULT

## (undated) DEVICE — TUBING CLEARLINK DUO-VENT - C-FLO (48EA/CA)

## (undated) DEVICE — ENDOSTITCH LOAD UNIT 0 SURGI - 12/CA

## (undated) DEVICE — TRAY SKIN SCRUB PVP WET (20EA/CA) PART #DYND70356 DISCONTINUED

## (undated) DEVICE — SUTURE 4-0 VICRYL PLUS FS-2 - 27 INCH (36/BX)

## (undated) DEVICE — GLOVE BIOGEL PI INDICATOR SZ 6.5 SURGICAL PF LF - (50/BX 4BX/CA)

## (undated) DEVICE — SENSOR SPO2 NEO LNCS ADHESIVE (20/BX) SEE USER NOTES

## (undated) DEVICE — LIGASURE 5MM BLUNT TIP LONG - 44CM (6EA/PK)

## (undated) DEVICE — CLIP MED LG INTNL HRZN TI ESCP - (20/BX)

## (undated) DEVICE — UTERINE MANIP RUMI 6.7X6 - (5/BX)

## (undated) DEVICE — GOWN WARMING STANDARD FLEX - (30/CA)

## (undated) DEVICE — RELOAD WITH GRIPPING SURGACE TECHNOLOGY GREEN 60MM (12EA/BX)

## (undated) DEVICE — PACK GASTRIC BANDING OR - (1/CA)

## (undated) DEVICE — SET SUCTION/IRRIGATION WITH DISPOSABLE TIP (6/CA )PART #0250-070-520 IS A SUB

## (undated) DEVICE — STAPLER POWERED 60MM (3EA/BX)

## (undated) DEVICE — GLOVE BIOGEL SZ 7.5 SURGICAL PF LTX - (50PR/BX 4BX/CA)

## (undated) DEVICE — NEPTUNE 4 PORT MANIFOLD - (20/PK)

## (undated) DEVICE — WATER IRRIG. STER. 1500 ML - (9/CA)

## (undated) DEVICE — CHLORAPREP 26 ML APPLICATOR - ORANGE TINT(25/CA)

## (undated) DEVICE — HUMID-VENT HEAT AND MOISTURE EXCHANGE- (50/BX)

## (undated) DEVICE — KIT ANESTHESIA W/CIRCUIT & 3/LT BAG W/FILTER (20EA/CA)

## (undated) DEVICE — SUCTION INSTRUMENT YANKAUER BULBOUS TIP W/O VENT (50EA/CA)

## (undated) DEVICE — TROCAR LAPSCP 100MM 12MM NTHRD - (6/BX)

## (undated) DEVICE — TROCAR 5X100 NON BLADED Z-TH - READ KII (6/BX)

## (undated) DEVICE — UTERINE MANIP RUMI 6.7X8 - (5/BX)

## (undated) DEVICE — PAD SANITARY 11IN MAXI IND WRAPPED  (12EA/PK 24PK/CA)

## (undated) DEVICE — DETERGENT RENUZYME PLUS 10 OZ PACKET (50/BX)

## (undated) DEVICE — SEALER VESSELHARMONIC ACE PLUS WITH ADVANCED HEMOSTASIS REPROCESSED 45CM  (6EA/CA)

## (undated) DEVICE — TROCAR Z THREAD12MM OPTICAL - NON BLADED (6/BX)

## (undated) DEVICE — CANISTER SUCTION 3000ML MECHANICAL FILTER AUTO SHUTOFF MEDI-VAC NONSTERILE LF DISP  (40EA/CA)

## (undated) DEVICE — GLOVE BIOGEL INDICATOR SZ 8 SURGICAL PF LTX - (50/BX 4BX/CA)

## (undated) DEVICE — GLOVE BIOGEL PI INDICATOR SZ 7.5 SURGICAL PF LF -(50/BX 4BX/CA)

## (undated) DEVICE — TROCAR Z THREAD 11 X 100 - BLADED (6/BX)

## (undated) DEVICE — ELECTRODE 5MM LHK LAPSCP STERILE DISP- MEGADYNE  (5/CA)

## (undated) DEVICE — UTERINE MANIP RUMI 6.7X10 - (5/BX)

## (undated) DEVICE — SUTURE 4-0 MONOCRYL PLUS PS-2 - 27 INCH (36/BX)

## (undated) DEVICE — SYRINGE 30 ML LL (56/BX)

## (undated) DEVICE — GLOVE BIOGEL PI ORTHO SZ 6 1/2 SURGICAL PF LF (40PR/BX)

## (undated) DEVICE — ELECTRODE DUAL RETURN W/ CORD - (50/PK)

## (undated) DEVICE — RELOAD WITH GRIPPING SURFACE TECHNOLOGY GOLD 60MM (12EA/BX)

## (undated) DEVICE — SET TUBING PNEUMOCLEAR HIGH FLOW SMOKE EVACUATION (10EA/BX)

## (undated) DEVICE — LACTATED RINGERS INJ 1000 ML - (14EA/CA 60CA/PF)

## (undated) DEVICE — PACK LAPAROSCOPY - (1/CA)

## (undated) DEVICE — TUBE CONNECT SUCTION CLEAR 120 X 1/4" (50EA/CA)"

## (undated) DEVICE — ENDOSTITCH10MM SUTURING DEVIC - (3/CA)

## (undated) DEVICE — GLOVE BIOGEL M SZ 8 SURGICAL PF LTX - (50/BX 4BX/CA)

## (undated) DEVICE — TUBING LAPAROSCOPIC PLUME DEVICE (10EA/CA)

## (undated) DEVICE — SUTURE 0 VICRYL PLUS UR-6 - 27 INCH (36/BX)

## (undated) DEVICE — PACK LAP CHOLE OR - (2EA/CA)

## (undated) DEVICE — TROCAR SEPARATOR 15MMZTHREAD - (6/BX)

## (undated) DEVICE — SUTURE 3-0 ENDO STITCH ABSORBALE 8 20CM (6EA/CA)"

## (undated) DEVICE — TROCAR5X55 KII SHIELDED SYS - (6/BX)

## (undated) DEVICE — GOWN SURGEONS X-LARGE - DISP. (30/CA)

## (undated) DEVICE — TOWEL STOP TIMEOUT SAFETY FLAG (40EA/CA)